# Patient Record
Sex: FEMALE | Race: WHITE | NOT HISPANIC OR LATINO | Employment: OTHER | URBAN - METROPOLITAN AREA
[De-identification: names, ages, dates, MRNs, and addresses within clinical notes are randomized per-mention and may not be internally consistent; named-entity substitution may affect disease eponyms.]

---

## 2017-02-03 ENCOUNTER — OFFICE VISIT (OUTPATIENT)
Dept: FAMILY MEDICINE | Facility: CLINIC | Age: 82
End: 2017-02-03
Payer: COMMERCIAL

## 2017-02-03 VITALS
TEMPERATURE: 96.9 F | HEART RATE: 86 BPM | DIASTOLIC BLOOD PRESSURE: 86 MMHG | OXYGEN SATURATION: 98 % | SYSTOLIC BLOOD PRESSURE: 192 MMHG

## 2017-02-03 DIAGNOSIS — S09.90XA HEAD TRAUMA, INITIAL ENCOUNTER: Primary | ICD-10-CM

## 2017-02-03 DIAGNOSIS — S01.01XA LACERATION OF SCALP, INITIAL ENCOUNTER: ICD-10-CM

## 2017-02-03 PROCEDURE — 99213 OFFICE O/P EST LOW 20 MIN: CPT | Performed by: PHYSICIAN ASSISTANT

## 2017-02-03 NOTE — PROGRESS NOTES
SUBJECTIVE:                                                    Halle Saucedo is a 89 year old female who presents to clinic today for the following health issues:      Patient fell in parking lot, hit back of head about 1 hour ago. She believes she slipped on ice, fell backwards, hitting her head on the cement. The fall was unwitnessed. She does not believe she had any loss of consciousness. She denies a headache, vision changes, dizziness, neck pain, numbness, and tingling.         Problem list and histories reviewed & adjusted, as indicated.  Additional history: as documented    Patient Active Problem List   Diagnosis     HYPERLIPIDEMIA LDL GOAL <130     Advanced directives, counseling/discussion     Glaucoma     PVD (peripheral vascular disease) (H)     Hard of hearing     Gout     Headache     GERD (gastroesophageal reflux disease)     Hypertension goal BP (blood pressure) < 150/90     Melanoma of skin (H)     Hypothyroidism     Other specified hypothyroidism     Past Surgical History   Procedure Laterality Date     Cataract iol, rt/lt       Tonsillectomy & adenoidectomy       childhood     D & c         Social History   Substance Use Topics     Smoking status: Never Smoker     Smokeless tobacco: Not on file     Alcohol use Yes      Comment: rare     Family History   Problem Relation Age of Onset     Hypertension Brother      Hypertension Sister      Hypertension Sister          Current Outpatient Prescriptions   Medication Sig Dispense Refill     losartan-hydrochlorothiazide (HYZAAR) 50-12.5 MG per tablet For blood pressure take one tab BID Pharmacy ok to hold prescription until due 180 tablet 1     furosemide (LASIX) 20 MG tablet Take 1 tablet (20 mg) by mouth daily Pharmacy ok to hold prescription until due 90 tablet 1     potassium chloride (K-TAB,KLOR-CON) 10 MEQ tablet Two tabs daily Pharmacy ok to hold prescription until due 180 tablet 1     omeprazole (PRILOSEC) 20 MG capsule Take 1 capsule (20 mg) by  mouth daily x10 days then as needed for heartburn/upset stomach 30 capsule 1     rosuvastatin (CRESTOR) 10 MG tablet Take 1 tablet (10 mg) by mouth daily For cholesterol Pharmacy ok to hold prescription until due 90 tablet 1     LUMIGAN 0.01 % SOLN        aspirin 81 MG tablet Take 2 tablets by mouth daily.       VITAMIN E 400mg 2 tabs daily       VITAMIN D OR 2 tabs daily       MULTI-VITAMIN OR 1 tab daily       Allergies   Allergen Reactions     Alphagan [Brimonidine Tartrate]      Azopt [Brinzolamide]      Beta Adrenergic Blockers      Bisphosphonates      Estrogens      Gemfibrozil      Xalatan [Latanoprost]      Zetia [Ezetimibe]      Zocor [Hmg-Coa-R Inhibitors]      BP Readings from Last 3 Encounters:   02/13/17 (!) 180/100   02/03/17 192/86   09/08/16 148/88    Wt Readings from Last 3 Encounters:   02/13/17 138 lb (62.6 kg)   09/08/16 140 lb (63.5 kg)   04/11/16 135 lb (61.2 kg)                  Problem list, Medication list, Allergies, and Medical/Social/Surgical histories reviewed in [facility] and updated as appropriate.    ROS:  Constitutional, HEENT, cardiovascular, pulmonary, neurological and integumentary    OBJECTIVE:                                                    /86  Pulse 86  Temp 96.9  F (36.1  C) (Oral)  SpO2 98%  There is no height or weight on file to calculate BMI.  GENERAL: healthy, alert and no distress  EYES: Eyes grossly normal to inspection, PERRL and EOMI  HENT: normal cephalic/atraumatic, ear canals and TM's normal, nose and mouth without ulcers or lesions, oropharynx clear and oral mucous membranes moist  NECK: no adenopathy, no asymmetry, masses, or scars and thyroid normal to palpation  RESP: lungs clear to auscultation - no rales, rhonchi or wheezes  CV: regular rate and rhythm, normal S1 S2, no S3 or S4, no murmur, click or rub, no peripheral edema and peripheral pulses strong  MS: no gross musculoskeletal defects noted, no edema  SKIN: 2cm laceration over the posterior scalp,  minimal bleeding, moderate swelling, mild tenderness to palpation   NEURO: Normal strength and tone, mentation intact and speech normal         ASSESSMENT/PLAN:                                                        ICD-10-CM    1. Head trauma, initial encounter S09.90XA    2. Laceration of scalp, initial encounter S01.01XA        Due to fall being unwitnessed, unknown loss of consciousness, age and taking a daily aspirin, patient was advised to go to [facility] for further evaluation. Her son will transport her to the [facility]. [facility] was notified of the patient. They are accepting the patient.     Marlyn Palacio PA-C  [facility]

## 2017-02-03 NOTE — MR AVS SNAPSHOT
"              After Visit Summary   2/3/2017    Halle Saucedo    MRN: [id number]           Patient Information     Date Of Birth          3/8/1926        Visit Information        Provider Department      2/3/2017 2:40 PM Marlyn Palacio PA-C [facility]        Today's Diagnoses     Head trauma, initial encounter    -  1    Laceration of scalp, initial encounter           Follow-ups after your visit        Who to contact     If you have questions or need follow up information about today's clinic visit or your schedule please contact [facility] directly at [phone].  Normal or non-critical lab and imaging results will be communicated to you by Bolthart, letter or phone within 4 business days after the clinic has received the results. If you do not hear from us within 7 days, please contact the clinic through Bolthart or phone. If you have a critical or abnormal lab result, we will notify you by phone as soon as possible.  Submit refill requests through [employer] or call your pharmacy and they will forward the refill request to us. Please allow 3 business days for your refill to be completed.          Additional Information About Your Visit        MyChart Information     [employer] lets you send messages to your doctor, view your test results, renew your prescriptions, schedule appointments and more. To sign up, go to [email or web address] . Click on \"Log in\" on the left side of the screen, which will take you to the Welcome page. Then click on \"Sign up Now\" on the right side of the page.     You will be asked to enter the access code listed below, as well as some personal information. Please follow the directions to create your username and password.     Your access code is: [email or web address]  Expires: 2017 11:28 AM     Your access code will  in 90 days. If you need help or a new code, please call your [facility] or [phone].        Care EveryWhere ID     This is your " Care EveryWhere ID. This could be used by other organizations to access your [facility] medical records  [id number]        Your Vitals Were     Pulse Temperature Pulse Oximetry             86 96.9  F (36.1  C) (Oral) 98%          Blood Pressure from Last 3 Encounters:   02/13/17 (!) 180/100   02/03/17 192/86   09/08/16 148/88    Weight from Last 3 Encounters:   02/13/17 138 lb (62.6 kg)   09/08/16 140 lb (63.5 kg)   04/11/16 135 lb (61.2 kg)              Today, you had the following     No orders found for display       Primary Care Provider Office Phone # Fax #    Santino Gordon [phone]       [facility] [address] [facility] [address]        Thank you!     Thank you for choosing [facility]  for your care. Our goal is always to provide you with excellent care. Hearing back from our patients is one way we can continue to improve our services. Please take a few minutes to complete the written survey that you may receive in the mail after your visit with us. Thank you!             Your Updated Medication List - Protect others around you: Learn how to safely use, store and throw away your medicines at www.disposemymeds.org.          This list is accurate as of: 2/3/17 11:59 PM.  Always use your most recent med list.                   Brand Name Dispense Instructions for use    aspirin 81 MG tablet      Take 2 tablets by mouth daily.       furosemide 20 MG tablet    LASIX    90 tablet    Take 1 tablet (20 mg) by mouth daily Pharmacy ok to hold prescription until due       losartan-hydrochlorothiazide 50-12.5 MG per tablet    HYZAAR    180 tablet    For blood pressure take one tab BID Pharmacy ok to hold prescription until due       LUMIGAN 0.01 % Soln   Generic drug:  bimatoprost          MULTI-VITAMIN PO      1 tab daily       omeprazole 20 MG CR capsule    priLOSEC    30 capsule    Take 1 capsule (20 mg) by mouth daily x10 days then as needed for heartburn/upset  stomach       potassium chloride 10 MEQ tablet    K-TAB,KLOR-CON    180 tablet    Two tabs daily Pharmacy ok to hold prescription until due       rosuvastatin 10 MG tablet    CRESTOR    90 tablet    Take 1 tablet (10 mg) by mouth daily For cholesterol Pharmacy ok to hold prescription until due       VITAMIN D PO      2 tabs daily       VITAMIN E      400mg 2 tabs daily

## 2017-02-06 NOTE — NURSING NOTE
Late entry - patient walked into clinic c/o head laceration. Patient was leaving the [facility] and walking out the front, patient said suddenly she just tripped over the curb and fell back, hit her head on the cement.  No LOC, patient c/o no pain anywhere else. About a 1 cm laceration to back of head. Patient will be seen by provider. .Stefania YEN, RN, CPN

## 2017-02-13 ENCOUNTER — OFFICE VISIT (OUTPATIENT)
Dept: FAMILY MEDICINE | Facility: CLINIC | Age: 82
End: 2017-02-13
Payer: COMMERCIAL

## 2017-02-13 VITALS
BODY MASS INDEX: 26.95 KG/M2 | HEART RATE: 95 BPM | DIASTOLIC BLOOD PRESSURE: 100 MMHG | TEMPERATURE: 96.8 F | SYSTOLIC BLOOD PRESSURE: 180 MMHG | WEIGHT: 138 LBS

## 2017-02-13 DIAGNOSIS — S01.01XD LACERATION OF SCALP, SUBSEQUENT ENCOUNTER: Primary | ICD-10-CM

## 2017-02-13 PROCEDURE — 99213 OFFICE O/P EST LOW 20 MIN: CPT | Performed by: FAMILY MEDICINE

## 2017-02-13 NOTE — MR AVS SNAPSHOT
"              After Visit Summary   2017    Halle Saucedo    MRN: [id number]           Patient Information     Date Of Birth          3/8/1926        Visit Information        Provider Department      2017 10:15 AM Santino Gordon MD [facility]        Today's Diagnoses     Laceration of scalp, subsequent encounter    -  1       Follow-ups after your visit        Who to contact     If you have questions or need follow up information about today's clinic visit or your schedule please contact [facility] directly at [phone].  Normal or non-critical lab and imaging results will be communicated to you by magnetic.iohart, letter or phone within 4 business days after the clinic has received the results. If you do not hear from us within 7 days, please contact the clinic through magnetic.iohart or phone. If you have a critical or abnormal lab result, we will notify you by phone as soon as possible.  Submit refill requests through [email or web address] or call your pharmacy and they will forward the refill request to us. Please allow 3 business days for your refill to be completed.          Additional Information About Your Visit        MyChart Information     [email or web address] lets you send messages to your doctor, view your test results, renew your prescriptions, schedule appointments and more. To sign up, go to [email or web address] . Click on \"Log in\" on the left side of the screen, which will take you to the Welcome page. Then click on \"Sign up Now\" on the right side of the page.     You will be asked to enter the access code listed below, as well as some personal information. Please follow the directions to create your username and password.     Your access code is: [email or web address]  Expires: 2017 11:28 AM     Your access code will  in 90 days. If you need help or a new code, please call your [facility] or [phone].        Care EveryWhere ID     This is your Care EveryWhere ID. This could be used by " other organizations to access your [facility] medical records  [id number]        Your Vitals Were     Pulse Temperature BMI (Body Mass Index)             95 96.8  F (36  C) (Oral) 26.95 kg/m2          Blood Pressure from Last 3 Encounters:   02/13/17 (!) 180/100   02/03/17 192/86   09/08/16 148/88    Weight from Last 3 Encounters:   02/13/17 138 lb (62.6 kg)   09/08/16 140 lb (63.5 kg)   04/11/16 135 lb (61.2 kg)              Today, you had the following     No orders found for display       Primary Care Provider Office Phone # Fax #    Santino Gordon [phone]       [facility] [address] [facility] [address]        Thank you!     Thank you for choosing [facility]  for your care. Our goal is always to provide you with excellent care. Hearing back from our patients is one way we can continue to improve our services. Please take a few minutes to complete the written survey that you may receive in the mail after your visit with us. Thank you!             Your Updated Medication List - Protect others around you: Learn how to safely use, store and throw away your medicines at www.disposemymeds.org.          This list is accurate as of: 2/13/17 11:28 AM.  Always use your most recent med list.                   Brand Name Dispense Instructions for use    aspirin 81 MG tablet      Take 2 tablets by mouth daily.       furosemide 20 MG tablet    LASIX    90 tablet    Take 1 tablet (20 mg) by mouth daily Pharmacy ok to hold prescription until due       losartan-hydrochlorothiazide 50-12.5 MG per tablet    HYZAAR    180 tablet    For blood pressure take one tab BID Pharmacy ok to hold prescription until due       LUMIGAN 0.01 % Soln   Generic drug:  bimatoprost          MULTI-VITAMIN PO      1 tab daily       omeprazole 20 MG CR capsule    priLOSEC    30 capsule    Take 1 capsule (20 mg) by mouth daily x10 days then as needed for heartburn/upset stomach       potassium  chloride 10 MEQ tablet    K-TAB,KLOR-CON    180 tablet    Two tabs daily Pharmacy ok to hold prescription until due       rosuvastatin 10 MG tablet    CRESTOR    90 tablet    Take 1 tablet (10 mg) by mouth daily For cholesterol Pharmacy ok to hold prescription until due       VITAMIN D PO      2 tabs daily       VITAMIN E      400mg 2 tabs daily

## 2017-02-13 NOTE — NURSING NOTE
Chief Complaint   Patient presents with     Hospital F/U     Fall     staple on back of head       Initial BP (!) 180/100  Pulse 95  Temp 96.8  F (36  C) (Oral)  Wt 138 lb (62.6 kg)  BMI 26.95 kg/m2 Estimated body mass index is 26.95 kg/(m^2) as calculated from the following:    Height as of 4/11/16: 5' (1.524 m).    Weight as of this encounter: 138 lb (62.6 kg).  Medication Reconciliation: complete   Keysha Kumar, CMA

## 2017-03-28 ENCOUNTER — DOCUMENTATION ONLY (OUTPATIENT)
Dept: LAB | Facility: CLINIC | Age: 82
End: 2017-03-28

## 2017-03-28 DIAGNOSIS — E78.5 HYPERLIPIDEMIA LDL GOAL <130: Primary | ICD-10-CM

## 2017-03-28 DIAGNOSIS — I10 HYPERTENSION GOAL BP (BLOOD PRESSURE) < 150/90: ICD-10-CM

## 2017-03-30 DIAGNOSIS — I10 HYPERTENSION GOAL BP (BLOOD PRESSURE) < 150/90: ICD-10-CM

## 2017-03-30 LAB
ALBUMIN SERPL-MCNC: 3.6 G/DL (ref 3.4–5)
ANION GAP SERPL CALCULATED.3IONS-SCNC: 10 MMOL/L (ref 3–14)
BUN SERPL-MCNC: 14 MG/DL (ref 7–30)
CALCIUM SERPL-MCNC: 9 MG/DL (ref 8.5–10.1)
CHLORIDE SERPL-SCNC: 98 MMOL/L (ref 94–109)
CO2 SERPL-SCNC: 29 MMOL/L (ref 20–32)
CREAT SERPL-MCNC: 0.77 MG/DL (ref 0.52–1.04)
GFR SERPL CREATININE-BSD FRML MDRD: 71 ML/MIN/1.7M2
GLUCOSE SERPL-MCNC: 91 MG/DL (ref 70–99)
PHOSPHATE SERPL-MCNC: 2.8 MG/DL (ref 2.5–4.5)
POTASSIUM SERPL-SCNC: 3.7 MMOL/L (ref 3.4–5.3)
SODIUM SERPL-SCNC: 137 MMOL/L (ref 133–144)

## 2017-03-30 PROCEDURE — 36415 COLL VENOUS BLD VENIPUNCTURE: CPT | Performed by: FAMILY MEDICINE

## 2017-03-30 PROCEDURE — 80069 RENAL FUNCTION PANEL: CPT | Performed by: FAMILY MEDICINE

## 2017-04-07 ENCOUNTER — OFFICE VISIT (OUTPATIENT)
Dept: FAMILY MEDICINE | Facility: CLINIC | Age: 82
End: 2017-04-07
Payer: COMMERCIAL

## 2017-04-07 VITALS
DIASTOLIC BLOOD PRESSURE: 76 MMHG | WEIGHT: 138 LBS | BODY MASS INDEX: 27.09 KG/M2 | HEART RATE: 83 BPM | TEMPERATURE: 97.1 F | OXYGEN SATURATION: 97 % | HEIGHT: 60 IN | SYSTOLIC BLOOD PRESSURE: 145 MMHG

## 2017-04-07 DIAGNOSIS — E78.5 HYPERLIPIDEMIA LDL GOAL <130: ICD-10-CM

## 2017-04-07 DIAGNOSIS — I10 HYPERTENSION GOAL BP (BLOOD PRESSURE) < 150/90: ICD-10-CM

## 2017-04-07 DIAGNOSIS — J30.2 SEASONAL ALLERGIC RHINITIS, UNSPECIFIED ALLERGIC RHINITIS TRIGGER: Primary | ICD-10-CM

## 2017-04-07 PROCEDURE — 99214 OFFICE O/P EST MOD 30 MIN: CPT | Performed by: FAMILY MEDICINE

## 2017-04-07 RX ORDER — POTASSIUM CHLORIDE 750 MG/1
TABLET, EXTENDED RELEASE ORAL
Qty: 180 TABLET | Refills: 1 | Status: SHIPPED | OUTPATIENT
Start: 2017-04-07 | End: 2017-10-02

## 2017-04-07 RX ORDER — LOSARTAN POTASSIUM AND HYDROCHLOROTHIAZIDE 12.5; 5 MG/1; MG/1
TABLET ORAL
Qty: 180 TABLET | Refills: 1 | Status: SHIPPED | OUTPATIENT
Start: 2017-04-07 | End: 2017-10-02

## 2017-04-07 RX ORDER — FUROSEMIDE 20 MG
20 TABLET ORAL DAILY
Qty: 90 TABLET | Refills: 1 | Status: SHIPPED | OUTPATIENT
Start: 2017-04-07 | End: 2017-10-02

## 2017-04-07 RX ORDER — ROSUVASTATIN CALCIUM 10 MG/1
10 TABLET, COATED ORAL DAILY
Qty: 90 TABLET | Refills: 1 | Status: SHIPPED | OUTPATIENT
Start: 2017-04-07 | End: 2017-10-02

## 2017-04-07 NOTE — PROGRESS NOTES
SUBJECTIVE:  Halle Saucedo, a 89 year old female scheduled an appointment to discuss the following issues:   blood pressure, chol check , sneezing and nasal congestion   History  skin cancer -seeing dermatology skin speaks  Most active/working in yard. No chest pain or shortness of breath. No nausea, vomiting or diarrhea or bloody stools. No urine changes and emotionally ok.   History ALLERGIC RHINITIS. Sinus congestion/sneezing. claritin not helpful. gerd ok. No dysphagia. prilosec rarely needed.     Medical, social, surgical, and family histories reviewed.    ROS:    OBJECTIVE:  /76  Pulse 83  Temp 97.1  F (36.2  C) (Oral)  Ht 5' (1.524 m)  Wt 138 lb (62.6 kg)  SpO2 97%  BMI 26.95 kg/m2  EXAM:  GENERAL APPEARANCE: healthy, alert and no distress  EYES: EOMI,  PERRL  HENT: ear canals and TM's normal and nose clear discharge  and mouth without ulcers or lesions  NECK: no adenopathy, no asymmetry, masses, or scars and thyroid normal to palpation  RESP: lungs clear to auscultation - no rales, rhonchi or wheezes  CV: regular rates and rhythm, normal S1 S2, no S3 or S4 and no murmur, click or rub -  ABDOMEN:  soft, nontender, no HSM or masses and bowel sounds normal  MS: extremities normal- no gross deformities noted, no evidence of inflammation in joints, FROM in all extremities.  PSYCH: mentation appears normal and affect normal/bright    ASSESSMENT / PLAN:  (J30.2) Seasonal allergic rhinitis, unspecified allergic rhinitis trigger  (primary encounter diagnosis)  Comment: needs help  Plan: over the counter claritin and possible flonase too. Reveiwed risks and side effects of medication  Expected course and warning signs reviewed. No sudafed.     (I10) Hypertension goal BP (blood pressure) < 150/90  Comment: stable  Plan: losartan-hydrochlorothiazide (HYZAAR) 50-12.5         MG per tablet, furosemide (LASIX) 20 MG tablet,        potassium chloride (K-TAB,KLOR-CON) 10 MEQ         tablet        Continue exercise.  Chest pain or shortness of breath to er.     (E78.5) Hyperlipidemia LDL goal <130  Comment: stable in past  Plan: rosuvastatin (CRESTOR) 10 MG tablet        No more testing needed with age/stability.       Santino Gordon MD

## 2017-04-07 NOTE — MR AVS SNAPSHOT
"              After Visit Summary   2017    Halle Saucedo    MRN: [id number]           Patient Information     Date Of Birth          3/8/1926        Visit Information        Provider Department      2017 12:00 PM Santino Gordon MD [facility]        Today's Diagnoses     Seasonal allergic rhinitis, unspecified allergic rhinitis trigger    -  1    Hypertension goal BP (blood pressure) < 150/90        Hyperlipidemia LDL goal <130           Follow-ups after your visit        Who to contact     If you have questions or need follow up information about today's clinic visit or your schedule please contact [facility] directly at [phone].  Normal or non-critical lab and imaging results will be communicated to you by MyChart, letter or phone within 4 business days after the clinic has received the results. If you do not hear from us within 7 days, please contact the clinic through MyChart or phone. If you have a critical or abnormal lab result, we will notify you by phone as soon as possible.  Submit refill requests through [email or web address] or call your pharmacy and they will forward the refill request to us. Please allow 3 business days for your refill to be completed.          Additional Information About Your Visit        MyChart Information     [email or web address] lets you send messages to your doctor, view your test results, renew your prescriptions, schedule appointments and more. To sign up, go to [email or web address] . Click on \"Log in\" on the left side of the screen, which will take you to the Welcome page. Then click on \"Sign up Now\" on the right side of the page.     You will be asked to enter the access code listed below, as well as some personal information. Please follow the directions to create your username and password.     Your access code is: [email or web address]  Expires: 2017 12:28 PM     Your access code will  in 90 days. If you need help or a new code, please call your " [facility] or [phone].        Care EveryWhere ID     This is your Care EveryWhere ID. This could be used by other organizations to access your [facility] medical records  [id number]        Your Vitals Were     Pulse Temperature Height Pulse Oximetry BMI (Body Mass Index)       83 97.1  F (36.2  C) (Oral) 5' (1.524 m) 97% 26.95 kg/m2        Blood Pressure from Last 3 Encounters:   04/07/17 145/76   02/13/17 (!) 180/100   02/03/17 192/86    Weight from Last 3 Encounters:   04/07/17 138 lb (62.6 kg)   02/13/17 138 lb (62.6 kg)   09/08/16 140 lb (63.5 kg)              Today, you had the following     No orders found for display         Where to get your medicines      These medications were sent to [facility] [address] [place] [address] AT [facility] of [address] [address]     Phone:  [phone]     furosemide 20 MG tablet    losartan-hydrochlorothiazide 50-12.5 MG per tablet    potassium chloride 10 MEQ tablet    rosuvastatin 10 MG tablet          Primary Care Provider Office Phone # Fax #    Santino Gordon [phone]       [facility] [address] [facility] [address]        Thank you!     Thank you for choosing [facility]  for your care. Our goal is always to provide you with excellent care. Hearing back from our patients is one way we can continue to improve our services. Please take a few minutes to complete the written survey that you may receive in the mail after your visit with us. Thank you!             Your Updated Medication List - Protect others around you: Learn how to safely use, store and throw away your medicines at www.disposemymeds.org.          This list is accurate as of: 4/7/17 12:33 PM.  Always use your most recent med list.                   Brand Name Dispense Instructions for use    aspirin 81 MG tablet      Take 2 tablets by mouth daily.       furosemide 20 MG  tablet    LASIX    90 tablet    Take 1 tablet (20 mg) by mouth daily Pharmacy ok to hold prescription until due       losartan-hydrochlorothiazide 50-12.5 MG per tablet    HYZAAR    180 tablet    For blood pressure take one tab BID Pharmacy ok to hold prescription until due       LUMIGAN 0.01 % Soln   Generic drug:  bimatoprost          MULTI-VITAMIN PO      1 tab daily       omeprazole 20 MG CR capsule    priLOSEC    30 capsule    Take 1 capsule (20 mg) by mouth daily x10 days then as needed for heartburn/upset stomach       potassium chloride 10 MEQ tablet    K-TAB,KLOR-CON    180 tablet    Two tabs daily Pharmacy ok to hold prescription until due       rosuvastatin 10 MG tablet    CRESTOR    90 tablet    Take 1 tablet (10 mg) by mouth daily For cholesterol Pharmacy ok to hold prescription until due       VITAMIN D PO      2 tabs daily       VITAMIN E      400mg 2 tabs daily

## 2017-04-07 NOTE — NURSING NOTE
Chief Complaint   Patient presents with     Hypertension     Lipids     Allergies       Initial /70  Pulse 83  Temp 97.1  F (36.2  C) (Oral)  Ht 5' (1.524 m)  Wt 138 lb (62.6 kg)  SpO2 97%  BMI 26.95 kg/m2 Estimated body mass index is 26.95 kg/(m^2) as calculated from the following:    Height as of this encounter: 5' (1.524 m).    Weight as of this encounter: 138 lb (62.6 kg).  Medication Reconciliation: complete

## 2017-08-16 ENCOUNTER — OFFICE VISIT (OUTPATIENT)
Dept: FAMILY MEDICINE | Facility: CLINIC | Age: 82
End: 2017-08-16
Payer: COMMERCIAL

## 2017-08-16 ENCOUNTER — RADIANT APPOINTMENT (OUTPATIENT)
Dept: GENERAL RADIOLOGY | Facility: CLINIC | Age: 82
End: 2017-08-16
Attending: FAMILY MEDICINE
Payer: COMMERCIAL

## 2017-08-16 VITALS
DIASTOLIC BLOOD PRESSURE: 54 MMHG | RESPIRATION RATE: 15 BRPM | SYSTOLIC BLOOD PRESSURE: 136 MMHG | BODY MASS INDEX: 26.76 KG/M2 | TEMPERATURE: 97.4 F | WEIGHT: 137 LBS | HEART RATE: 78 BPM | OXYGEN SATURATION: 100 %

## 2017-08-16 DIAGNOSIS — R53.83 FATIGUE, UNSPECIFIED TYPE: Primary | ICD-10-CM

## 2017-08-16 DIAGNOSIS — R05.9 COUGH: ICD-10-CM

## 2017-08-16 DIAGNOSIS — R05.3 PERSISTENT COUGH FOR 3 WEEKS OR LONGER: ICD-10-CM

## 2017-08-16 DIAGNOSIS — R07.89 CHEST HEAVINESS: ICD-10-CM

## 2017-08-16 PROCEDURE — 99214 OFFICE O/P EST MOD 30 MIN: CPT | Mod: 25 | Performed by: FAMILY MEDICINE

## 2017-08-16 PROCEDURE — 93000 ELECTROCARDIOGRAM COMPLETE: CPT | Performed by: FAMILY MEDICINE

## 2017-08-16 PROCEDURE — 71020 XR CHEST 2 VW: CPT

## 2017-08-16 RX ORDER — LEVOFLOXACIN 500 MG/1
500 TABLET, FILM COATED ORAL DAILY
Qty: 7 TABLET | Refills: 0 | Status: SHIPPED | OUTPATIENT
Start: 2017-08-16 | End: 2017-08-28

## 2017-08-16 ASSESSMENT — PAIN SCALES - GENERAL: PAINLEVEL: NO PAIN (0)

## 2017-08-16 NOTE — NURSING NOTE
Chief Complaint   Patient presents with     Cough     c/o cough x 1 month       Initial /54  Pulse 78  Temp 97.4  F (36.3  C) (Oral)  Resp 15  Wt 137 lb (62.1 kg)  SpO2 100%  Breastfeeding? No  BMI 26.76 kg/m2 Estimated body mass index is 26.76 kg/(m^2) as calculated from the following:    Height as of 4/7/17: 5' (1.524 m).    Weight as of this encounter: 137 lb (62.1 kg).  Medication Reconciliation: complete   Dary Lew MA

## 2017-08-16 NOTE — PROGRESS NOTES
SUBJECTIVE:                                                    Halle Saucedo is a 89 year old female who presents to clinic today for the following health issues:      RESPIRATORY SYMPTOMS      Duration: 1 month    Description  Cough and fatigue    Severity: moderate    Accompanying signs and symptoms: None    History (predisposing factors):  none    Precipitating or alleviating factors: None    Therapies tried and outcome:  none      Had a cold about a month ago and started with a real bad sore throat for 3 days  Sore throat for 3 days then got better but then the next day the voice got hoarse  And then also a really bad cough. Cough was wheezing for one day then went away  Cough persisted but more hacking  Things got better though patient didn't come in.  But then the cough persists hacking cough. Some days are good some days are worse.  Today thinks it's a little worse.  Patient also feels tired this morning.   Patient just doesn't feel so good.  Sleeping ok   Chest Pain or shortness of breath: occasionally chest feels heavy  This morning when she woke up felt her chest was heavy.   Orthopnea, Edema, PND: No  Cough, colds, or respiratory symptoms: Yes: above  Abdominal Pain: No  Urinary symptoms or Flank Pain: No  Focal numbness or weakness: No  Rash: No  Fever or Chills: No  Weight loss: No  Poor Appetite: No  History of Thyroid problems/thyroid medication compliance: No  Headache or Neck stiffness: No  Joint Pain or Arthralgias: No  Melena/Hematochezia/Hematemesis: No  Depression or Mood changes: No  Concern about recent tick-bite: No  Concern about recent travel/possible exposure: No    Problem list and histories reviewed & adjusted, as indicated.  Additional history: as documented    Problem list, Medication list, Allergies, and Medical/Social/Surgical histories reviewed in EPIC and updated as appropriate.    ROS:  Constitutional, HEENT, cardiovascular, pulmonary, GI, , musculoskeletal, neuro, skin,  endocrine and psych systems are negative, except as otherwise noted.      OBJECTIVE:                                                    /54  Pulse 78  Temp 97.4  F (36.3  C) (Oral)  Resp 15  Wt 137 lb (62.1 kg)  SpO2 100%  Breastfeeding? No  BMI 26.76 kg/m2  Body mass index is 26.76 kg/(m^2).  GENERAL: healthy, alert and no distress  EYES: Eyes grossly normal to inspection, PERRL and conjunctivae and sclerae normal  Pink palpebral conjunctiva  HENT: ear canals and TM's normal, nose and mouth without ulcers or lesions  NECK: no adenopathy, no asymmetry, masses, or scars and thyroid normal to palpation  RESP: lungs clear to auscultation - no rales, rhonchi or wheezes  DECREASED BREATH SOUNDS BILATERAL LUNG BASES   CV: regular rate and rhythm, normal S1 S2, no S3 or S4, no murmur, click or rub, no peripheral edema and peripheral pulses strong  ABDOMEN: soft, nontender, no hepatosplenomegaly, no masses and bowel sounds normal  MS: no gross musculoskeletal defects noted, no edema  SKIN: no suspicious lesions or rashes  NEURO: Normal strength and tone, mentation intact and speech normal  PSYCH: mentation appears normal, affect normal/bright    Diagnostic Test Results:  Results for orders placed or performed in visit on 08/16/17 (from the past 24 hour(s))   XR Chest 2 Views    Narrative    XR CHEST 2 VW 8/16/2017 1:53 PM    COMPARISON: 9/8/2016    HISTORY: Chest pain and cough.      Impression    IMPRESSION: Cardiac silhouette and pulmonary vasculature are within  normal limits. No focal airspace disease, pleural effusion or  pneumothorax.    DELANEY ELIZAEBTH MD        EKG: reviewed myself. Nothing acute. Occasional PVC    ASSESSMENT/PLAN:                                                    No diagnosis found.      ICD-10-CM    1. Fatigue, unspecified type R53.83 EKG 12-lead complete w/read - Clinics     levofloxacin (LEVAQUIN) 500 MG tablet   2. Chest heaviness R07.89 EKG 12-lead complete w/read - Clinics     XR  "Chest 2 Views   3. Cough R05 XR Chest 2 Views     levofloxacin (LEVAQUIN) 500 MG tablet   4. Persistent cough for 3 weeks or longer R05      Recommended ER evaluation for complete cardiorespiratory rule out however patient declines  She backtracks a little bit and states, \" I would just like an antibiotic. I don't feel that bad\"  Prescribed with levaquin. Side effects discussed. Aware of the risks of increased tendon rupture with fluoroquinolones, especially if used with steroids.  Chest xray negative to my review as well  Patient instructions discussed with patient  Recommend follow up with primary care provider if no relief, sooner if worse  Alarm signs or symptoms discussed, if present recommend go to ER   If with any symptoms of chest pain or shortness of breath, lightheadedness, palpitations, feeling like passing out or change and worsening in the quality of your symptoms, please proceed to ER. Recommend follow up with PCP in a few days for re-evaluation.   Adverse reactions of medications discussed.  Aware to come back in if with worsening symptoms or if no relief despite treatment plan  Patient voiced understanding and had no further questions.     MD Griselda Dave MD  [facility]      "

## 2017-08-28 ENCOUNTER — OFFICE VISIT (OUTPATIENT)
Dept: FAMILY MEDICINE | Facility: CLINIC | Age: 82
End: 2017-08-28
Payer: COMMERCIAL

## 2017-08-28 VITALS
SYSTOLIC BLOOD PRESSURE: 145 MMHG | HEART RATE: 53 BPM | DIASTOLIC BLOOD PRESSURE: 88 MMHG | BODY MASS INDEX: 26.56 KG/M2 | OXYGEN SATURATION: 95 % | TEMPERATURE: 97 F | WEIGHT: 136 LBS

## 2017-08-28 DIAGNOSIS — R05.9 COUGH: ICD-10-CM

## 2017-08-28 DIAGNOSIS — K21.00 GASTROESOPHAGEAL REFLUX DISEASE WITH ESOPHAGITIS: Primary | ICD-10-CM

## 2017-08-28 PROCEDURE — 99214 OFFICE O/P EST MOD 30 MIN: CPT | Performed by: FAMILY MEDICINE

## 2017-08-28 NOTE — NURSING NOTE
Chief Complaint   Patient presents with     Cough     RECHECK       Initial /80  Pulse 53  Temp 97  F (36.1  C) (Oral)  Wt 136 lb (61.7 kg)  SpO2 95%  BMI 26.56 kg/m2 Estimated body mass index is 26.56 kg/(m^2) as calculated from the following:    Height as of 4/7/17: 5' (1.524 m).    Weight as of this encounter: 136 lb (61.7 kg).  Medication Reconciliation: complete   Keysha Kumar, CMA

## 2017-08-28 NOTE — MR AVS SNAPSHOT
After Visit Summary   8/28/2017    Halle Saucedo    MRN: [id number]           Patient Information     Date Of Birth          3/8/1926        Visit Information        Provider Department      8/28/2017 2:50 PM Santino Gordon MD [facility]        Today's Diagnoses     Gastroesophageal reflux disease with esophagitis    -  1    Cough           Follow-ups after your visit        Additional Services     ALLERGY/ASTHMA ADULT REFERRAL       Your provider has referred you to: [facility]: [facility]  [phone] [email or web address]    Please be aware that coverage of these services is subject to the terms and limitations of your health insurance plan.  Call member services at your health plan with any benefit or coverage questions.      Please bring the following with you to your appointment:    (1) Any X-Rays, CTs or MRIs which have been performed.  Contact the facility where they were done to arrange for  prior to your scheduled appointment.    (2) List of current medications  (3) This referral request   (4) Any documents/labs given to you for this referral                  Who to contact     If you have questions or need follow up information about today's clinic visit or your schedule please contact [facility] directly at [phone].  Normal or non-critical lab and imaging results will be communicated to you by MyChart, letter or phone within 4 business days after the clinic has received the results. If you do not hear from us within 7 days, please contact the clinic through MyChart or phone. If you have a critical or abnormal lab result, we will notify you by phone as soon as possible.  Submit refill requests through [employer] or call your pharmacy and they will forward the refill request to us. Please allow 3 business days for your refill to be completed.          Additional Information About Your Visit        MyChart Information   "   [employer] lets you send messages to your doctor, view your test results, renew your prescriptions, schedule appointments and more. To sign up, go to [email or web address] . Click on \"Log in\" on the left side of the screen, which will take you to the Welcome page. Then click on \"Sign up Now\" on the right side of the page.     You will be asked to enter the access code listed below, as well as some personal information. Please follow the directions to create your username and password.     Your access code is: [email or web address]  Expires: 2017  3:12 PM     Your access code will  in 90 days. If you need help or a new code, please call your [facility] clinic or [phone].        Care EveryWhere ID     This is your Care EveryWhere ID. This could be used by other organizations to access your [facility] medical records  [id number]        Your Vitals Were     Pulse Temperature Pulse Oximetry BMI (Body Mass Index)          53 97  F (36.1  C) (Oral) 95% 26.56 kg/m2         Blood Pressure from Last 3 Encounters:   17 145/88   17 136/54   17 145/76    Weight from Last 3 Encounters:   17 136 lb (61.7 kg)   17 137 lb (62.1 kg)   17 138 lb (62.6 kg)              We Performed the Following     ALLERGY/ASTHMA ADULT REFERRAL          Where to get your medicines      These medications were sent to [facility] [address], [address]  [id number] [place] [address] [address]     Phone:  [phone]     omeprazole 20 MG CR capsule          Primary Care Provider Office Phone # Fax #    Santino Gordon [phone] [address] [facility] [address]        Equal Access to Services     YANIRA MCCALL : Thuy Kramer, eloise snowden, jai dennis. [facility] [phone].    ATENCIÓN: Si habla lizaañol, tiene a reza disposición servicios gratuitos de asistencia " lingüísticaEfrain Nova al [phone].    We comply with applicable federal civil rights laws and [place] laws. We do not discriminate on the basis of race, color, national origin, age, disability sex, sexual orientation or gender identity.            Thank you!     Thank you for choosing [facility]  [facility]. Our goal is always to provide you with excellent care. Hearing back from our patients is one way we can continue to improve our services. Please take a few minutes to complete the written survey that you may receive in the mail after your visit with us. Thank you!             Your Updated Medication List - Protect others around you: Learn how to safely use, store and throw away your medicines at www.disposemymeds.org.          This list is accurate as of: 8/28/17  4:28 PM.  Always use your most recent med list.                   Brand Name Dispense Instructions for use Diagnosis    aspirin 81 MG tablet      Take 2 tablets by mouth daily.        furosemide 20 MG tablet    LASIX    90 tablet    Take 1 tablet (20 mg) by mouth daily Pharmacy ok to hold prescription until due    Hypertension goal BP (blood pressure) < 150/90       losartan-hydrochlorothiazide 50-12.5 MG per tablet    HYZAAR    180 tablet    For blood pressure take one tab BID Pharmacy ok to hold prescription until due    Hypertension goal BP (blood pressure) < 150/90       LUMIGAN 0.01 % Soln   Generic drug:  bimatoprost           MULTI-VITAMIN PO      1 tab daily        omeprazole 20 MG CR capsule    priLOSEC    30 capsule    Take 1 capsule (20 mg) by mouth daily x10 days then as needed for heartburn/upset stomach    Gastroesophageal reflux disease with esophagitis       potassium chloride 10 MEQ tablet    K-TAB,KLOR-CON    180 tablet    Two tabs daily Pharmacy ok to hold prescription until due    Hypertension goal BP (blood pressure) < 150/90       rosuvastatin 10 MG tablet    CRESTOR    90 tablet    Take 1 tablet (10 mg) by  mouth daily For cholesterol Pharmacy ok to hold prescription until due    Hyperlipidemia LDL goal <130       VITAMIN D PO      2 tabs daily        VITAMIN E      400mg 2 tabs daily

## 2017-08-28 NOTE — PROGRESS NOTES
SUBJECTIVE:  Halle Saucedo, a 89 year old female scheduled an appointment to discuss the following issues:  Cough. Seen in urgent care 2.5 weeks ago and given prescription levaquin and normal xray. Non-smoker.   Cough about the same. No fevers or chills. Dry cough. mild sinus congestion.   Some sneezing today. No itchy red eyes.   levaquin - side effect - some dizziness. No wheezing/ shortness of breath.   No history bronchitis/pneumonia. No travel recently.  No plane/car rides.  gerd symptoms - rolaids. Rare prilosec.    Overall less fatigued now.     Medical, social, surgical, and family histories reviewed.    ROS:    OBJECTIVE:  /88  Pulse 53  Temp 97  F (36.1  C) (Oral)  Wt 136 lb (61.7 kg)  SpO2 95%  BMI 26.56 kg/m2  EXAM:  GENERAL APPEARANCE: healthy, alert and no distress  EYES: EOMI,  PERRL  HENT: ear canals and TM's normal and nose clear discharge  and mouth without ulcers or lesions  NECK: no adenopathy, no asymmetry, masses, or scars and thyroid normal to palpation  RESP: faint basalir crackles. Good overall airflow. No wheezing.   CV: regular rates and rhythm, normal S1 S2, no S3 or S4 and no murmur, click or rub -  ABDOMEN:  soft, nontender, no HSM or masses and bowel sounds normal  MS: extremities normal- no gross deformities noted, no evidence of inflammation in joints, FROM in all extremities.  PSYCH: mentation appears normal and affect normal/bright    ASSESSMENT / PLAN:  (K21.0) Gastroesophageal reflux disease with esophagitis  (primary encounter diagnosis)  Comment: needs help  Plan: omeprazole (PRILOSEC) 20 MG CR capsule        Possible source of cough. egd if worse. Reveiwed risks and side effects of medication  x10 days then prn. Limit spicy foods/caffeine/ late eating.     (R05) Cough  Comment: resolving pneumonia/bronchitis vs post-nasal or gerd. Some crackles in lungs but no fevers or chills and normal xray  Plan: ALLERGY/ASTHMA ADULT REFERRAL        Follow-up allergist for second  opinion if not continue improve in next 2-3 weeks. Return to clinic sooner if worse/new symptoms. To ER if shortness of breath/high fevers or [place] weight gain. Consider ct scan but non-smoker. Call/email with questions/concerns. Expected course and warning signs reviewed.   Trial of claritin?  Santino Gordon MD

## 2017-09-26 ENCOUNTER — TELEPHONE (OUTPATIENT)
Dept: FAMILY MEDICINE | Facility: CLINIC | Age: 82
End: 2017-09-26

## 2017-09-26 NOTE — TELEPHONE ENCOUNTER
Patient is schedule with Dr. Gordon. Please triage pt per Dr. Gordon for headache and confusion .    Keysha Kumar, CMA

## 2017-09-26 NOTE — TELEPHONE ENCOUNTER
Halle Saucedo is a 89 year old female who calls with mckay..    NURSING ASSESSMENT:  Description: spoke with pt daughter.  Pt was fine in am yesterday and then at 11 am developed a violent ha with dizziness.  Pt could not figure out how to use phone.   Pt has had increasing fatigue the last few months and has fatigue today.  Pt has nausea and is not eating much.  Pt is not stable on feet today.  Pt states ha is only present with hard cough today.  Pt has been sleeping for most of the morning.   Allergies:   Allergies   Allergen Reactions     Alphagan [Brimonidine Tartrate]      Azopt [Brinzolamide]      Beta Adrenergic Blockers      Bisphosphonates      Estrogens      Gemfibrozil      Xalatan [Latanoprost]      Zetia [Ezetimibe]      Zocor [Hmg-Coa-R Inhibitors]          NURSING PLAN: Huddle with provider, plan includes pt should be brought to ER now    RECOMMENDED DISPOSITION:  To ED, another person to drive - daughter will take her.   Will comply with recommendation: Yes  If further questions/concerns or if symptoms do not improve, worsen or new symptoms develop, call your PCP or [facility] as soon as possible.      Guideline used:  Spoke with provider.   To provider to cosign.    Keysha Juárez RN

## 2017-09-29 PROBLEM — Z53.9 ERRONEOUS ENCOUNTER--DISREGARD: Status: ACTIVE | Noted: 2017-09-29

## 2017-10-02 ENCOUNTER — OFFICE VISIT (OUTPATIENT)
Dept: FAMILY MEDICINE | Facility: CLINIC | Age: 82
End: 2017-10-02
Payer: COMMERCIAL

## 2017-10-02 VITALS
DIASTOLIC BLOOD PRESSURE: 80 MMHG | WEIGHT: 131 LBS | SYSTOLIC BLOOD PRESSURE: 120 MMHG | HEIGHT: 60 IN | BODY MASS INDEX: 25.72 KG/M2 | OXYGEN SATURATION: 99 % | TEMPERATURE: 98.8 F | HEART RATE: 50 BPM

## 2017-10-02 DIAGNOSIS — E78.5 HYPERLIPIDEMIA LDL GOAL <130: ICD-10-CM

## 2017-10-02 DIAGNOSIS — R41.3 MEMORY DIFFICULTIES: ICD-10-CM

## 2017-10-02 DIAGNOSIS — I65.29 STENOSIS OF CAROTID ARTERY, UNSPECIFIED LATERALITY: ICD-10-CM

## 2017-10-02 DIAGNOSIS — R09.89 LABILE BLOOD PRESSURE: Primary | ICD-10-CM

## 2017-10-02 DIAGNOSIS — I10 HYPERTENSION GOAL BP (BLOOD PRESSURE) < 150/90: ICD-10-CM

## 2017-10-02 PROCEDURE — 99214 OFFICE O/P EST MOD 30 MIN: CPT | Performed by: FAMILY MEDICINE

## 2017-10-02 RX ORDER — LOSARTAN POTASSIUM AND HYDROCHLOROTHIAZIDE 12.5; 5 MG/1; MG/1
TABLET ORAL
Qty: 180 TABLET | Refills: 1 | Status: SHIPPED | OUTPATIENT
Start: 2017-10-02 | End: 2017-10-11 | Stop reason: ALTCHOICE

## 2017-10-02 RX ORDER — AMLODIPINE BESYLATE 5 MG/1
5 TABLET ORAL DAILY
Qty: 90 TABLET | Refills: 1 | Status: SHIPPED | OUTPATIENT
Start: 2017-10-02 | End: 2017-10-11 | Stop reason: ALTCHOICE

## 2017-10-02 RX ORDER — ROSUVASTATIN CALCIUM 10 MG/1
10 TABLET, COATED ORAL DAILY
Qty: 90 TABLET | Refills: 1 | Status: SHIPPED | OUTPATIENT
Start: 2017-10-02 | End: 2017-10-11 | Stop reason: ALTCHOICE

## 2017-10-02 RX ORDER — FUROSEMIDE 20 MG
20 TABLET ORAL DAILY
Qty: 135 TABLET | Refills: 1 | Status: SHIPPED | OUTPATIENT
Start: 2017-10-02 | End: 2018-08-13

## 2017-10-02 RX ORDER — AMLODIPINE BESYLATE 5 MG/1
5 TABLET ORAL
COMMUNITY
Start: 2017-09-27 | End: 2017-10-02

## 2017-10-02 RX ORDER — POTASSIUM CHLORIDE 750 MG/1
TABLET, EXTENDED RELEASE ORAL
Qty: 180 TABLET | Refills: 1 | Status: SHIPPED | OUTPATIENT
Start: 2017-10-02 | End: 2018-03-19

## 2017-10-02 NOTE — PROGRESS NOTES
SUBJECTIVE:   Halle Saucedo is a 89 year old female who presents to clinic today for the following health issues:    Follow-up headache/hypertensive urgency. norvasc added. Normal u/a, cbc, renal panel and troponin. No cancers/masses seen on brain imaging but stenosis of carotid noted.   Patient will call vascular surgery. Headaches resolves. Blood pressure cuff at home wrist. Home reading 157-95/95-71. Average 120/80. No chest pain. No shortness of breath. Memory not as sharp since discharge. Here with daughter. No focal weakness/tingling. No nausea, vomiting or diarrhea. No black/bloody stools. No abdominal pain. No urine changes or hematuria. Emotionally ok and sleep ok. No palpitations. Taking asa. No blurry vision - seen eye provider tomorrow. No rashes or joint/leg swelling.   No added sodium/salt. Likes soup. Limited restaurants.     Per d/c summary:  BRIEF HOSPITAL COURSE: This 89 y.o. female presented to the Emergency Room with complaints of headache, confusion, and high blood pressure. Her symptoms resolved by the time she was seen in the ER. She was noted to have significant hypertension. She was started on amlodipine and monitored overnight. While in the ER, she had a possible brief run of atrial fibrillation noted telemetry, however, this resolved prior to EKG. She did not have any further cardiac dysrhythmia noted during the hospital stay. She received 2 doses of metoprolol because of the brief tachycardia. Her blood pressure was better controlled on 9/27. She did have some mild orthostatic change without symptoms. She felt well and had no further headache or confusion episodes. She requested discharge home. She will remain on amlodipine taken at lunch time. She is instructed to self monitor her blood pressure twice daily and be cautious for low blood pressure readings and changes in blood pressure with sitting / standing. She was cleared for discharge with follow up in clinic within one week for blood  pressure check. Patient referred to vascular surgery for evaluation of carotid stenosis.    Hospital Follow-up Visit:    Hospital/Nursing Home/IP Rehab Facility: [facility]  Date of Admission: 09-  Date of Discharge: 09-  Reason(s) for Admission: blood pressure             Problems taking medications regularly:  None       Medication changes since discharge:        Problems adhering to non-medication therapy:  None      Summary of hospitalization:  [facility] discharge summary reviewed  Diagnostic Tests/Treatments reviewed.  Follow up needed: none  Other Healthcare Providers Involved in Patient s Care:         None  Update since discharge: improved.     Post Discharge Medication Reconciliation: discharge medications reconciled, continue medications without change.  Plan of care communicated with patient and family     Coding guidelines for this visit:  Type of Medical   Decision Making Face-to-Face Visit       within 7 Days of discharge Face-to-Face Visit        within 14 days of discharge   Moderate Complexity 17036 [id number]   High Complexity 48305 [id number]              PROBLEMS TO ADD ON...    Problem list and histories reviewed & adjusted, as indicated.  Additional history: as documented    Patient Active Problem List   Diagnosis     HYPERLIPIDEMIA LDL GOAL <130     Advanced directives, counseling/discussion     Glaucoma     PVD (peripheral vascular disease) (H)     Hard of hearing     Gout     Headache     GERD (gastroesophageal reflux disease)     Hypertension goal BP (blood pressure) < 150/90     Melanoma of skin (H)     Hypothyroidism     Other specified hypothyroidism     Seasonal allergic rhinitis, unspecified allergic rhinitis trigger     ERRONEOUS ENCOUNTER--DISREGARD     Stenosis of carotid artery, unspecified laterality     Past Surgical History:   Procedure Laterality Date     CATARACT IOL, RT/LT       D & C       TONSILLECTOMY & ADENOIDECTOMY      childhood       Social History  "  Substance Use Topics     Smoking status: Never Smoker     Smokeless tobacco: Not on file     Alcohol use Yes      Comment: rare     Family History   Problem Relation Age of Onset     Hypertension Brother      Hypertension Sister      Hypertension Sister              Reviewed and updated as needed this [facility]  M[facility]       Reviewed and updated as needed this visit by Provider         ROS:  All other ROS negative.     OBJECTIVE:     /80 (BP Location: Right arm, Patient Position: Sitting, Cuff Size: Adult Regular)  Pulse 50  Temp 98.8  F (37.1  C) (Oral)  Ht 4' 11.75\" (1.518 m)  Wt 131 lb (59.4 kg)  SpO2 99%  BMI 25.8 kg/m2  Body mass index is 25.8 kg/(m^2).  GENERAL: healthy, alert and no distress  EYES: Eyes grossly normal to inspection, PERRL and conjunctivae and sclerae normal  HENT: ear canals and TM's normal, nose and mouth without ulcers or lesions  NECK: no adenopathy, no asymmetry, masses, or scars and thyroid normal to palpation  RESP: lungs clear to auscultation - no rales, rhonchi or wheezes  CV: regular rate and rhythm, normal S1 S2, no S3 or S4, no murmur, click or rub, no peripheral edema and peripheral pulses strong  ABDOMEN: soft, nontender, no hepatosplenomegaly, no masses and bowel sounds normal  MS: no gross musculoskeletal defects noted, no edema  SKIN: no suspicious lesions or rashes  NEURO: Normal strength and tone, mentation intact and speech normal  PSYCH: mentation appears normal but a little slow affect normal/bright        ASSESSMENT/PLAN:     ASSESSMENT / PLAN:  (R09.89) Labile blood pressure  (primary encounter diagnosis)  Comment: unclear etiology last week. Appears better now  Plan: order for DME        Continue closely monitor. Prescription new blood pressure cuff. Limit sodium <2g/day and consistent meal times.     (I10) Hypertension goal BP (blood pressure) < 150/90  Comment: ok now  Plan: losartan-hydrochlorothiazide (HYZAAR) 50-12.5         MG " per tablet, furosemide (LASIX) 20 MG tablet,        potassium chloride (K-TAB,KLOR-CON) 10 MEQ         tablet, amLODIPine (NORVASC) 5 MG tablet, order        for DME        See above. If frequently <110/70 than hold norvasc vs if consistently >160/100 extra lasix. Recheck in 3 months  Sooner if worse. Chest pain or shortness of breath or new neuro changes to ER.  Expected course and warning signs reviewed. Call/email with questions/concerns    (I65.29) Stenosis of carotid artery, unspecified laterality  Plan: seeing surgery. Not sure major/aggressive surgery a good idea but can maybe do shenting? Can get second opinion if needed. Good blood pressure/lipid control. Continue asa daily.     (E78.5) Hyperlipidemia LDL goal <130  Comment: stable in past  Plan: rosuvastatin (CRESTOR) 10 MG tablet        Recheck in 6 months      (R41.3) Memory difficulties  Comment: new from last week. From blood pressure lower or mini-cva?  Plan: discuss with vascular surgery and maybe hold norvasc (if blood pressure not too high) to see if helpful. Continue keep active. Consider neuro input too. Recheck in 3 months  Sooner if worse/new issues. Daughter closely involved.     Santino Gordon MD  [facility]

## 2017-10-02 NOTE — NURSING NOTE
"Chief Complaint   Patient presents with     Hospital F/U       Initial /80 (BP Location: Right arm, Patient Position: Sitting, Cuff Size: Adult Regular)  Pulse 50  Temp 98.8  F (37.1  C) (Oral)  Ht 4' 11.75\" (1.518 m)  Wt 131 lb (59.4 kg)  SpO2 99%  BMI 25.8 kg/m2 Estimated body mass index is 25.8 kg/(m^2) as calculated from the following:    Height as of this encounter: 4' 11.75\" (1.518 m).    Weight as of this encounter: 131 lb (59.4 kg).  Medication Reconciliation: complete  Keysha Kumra CMA    "

## 2017-10-02 NOTE — MR AVS SNAPSHOT
"              After Visit Summary   10/2/2017    Halle Saucedo    MRN: [id number]           Patient Information     Date Of Birth          3/8/1926        Visit Information        Provider Department      10/2/2017 10:35 AM Santino Gordon MD [facility]        Today's Diagnoses     Labile blood pressure    -  1    Hypertension goal BP (blood pressure) < 150/90        Stenosis of carotid artery, unspecified laterality        Hyperlipidemia LDL goal <130           Follow-ups after your visit        Who to contact     If you have questions or need follow up information about today's clinic visit or your schedule please contact [facility] directly at [phone].  Normal or non-critical lab and imaging results will be communicated to you by MyChart, letter or phone within 4 business days after the clinic has received the results. If you do not hear from us within 7 days, please contact the clinic through Limeadehart or phone. If you have a critical or abnormal lab result, we will notify you by phone as soon as possible.  Submit refill requests through Wooop or call your pharmacy and they will forward the refill request to us. Please allow 3 business days for your refill to be completed.          Additional Information About Your Visit        MyChart Information     Wooop lets you send messages to your doctor, view your test results, renew your prescriptions, schedule appointments and more. To sign up, go to [email or web address] . Click on \"Log in\" on the left side of the screen, which will take you to the Welcome page. Then click on \"Sign up Now\" on the right side of the page.     You will be asked to enter the access code listed below, as well as some personal information. Please follow the directions to create your username and password.     Your access code is: [email or web address]  Expires: 2017  3:12 PM     Your access code will  in 90 days. If you need help or a new code, " "please call your [facility] clinic or [phone].        Care EveryWhere ID     This is your Care EveryWhere ID. This could be used by other organizations to access your [facility] medical records  [id number]        Your Vitals Were     Pulse Temperature Height Pulse Oximetry BMI (Body Mass Index)       50 98.8  F (37.1  C) (Oral) 4' 11.75\" (1.518 m) 99% 25.8 kg/m2        Blood Pressure from Last 3 Encounters:   10/02/17 120/80   08/28/17 145/88   08/16/17 136/54    Weight from Last 3 Encounters:   10/02/17 131 lb (59.4 kg)   08/28/17 136 lb (61.7 kg)   08/16/17 137 lb (62.1 kg)              Today, you had the following     No orders found for display         Today's Medication Changes          These changes are accurate as of: 10/2/17 11:22 AM.  If you have any questions, ask your nurse or doctor.               These medicines have changed or have updated prescriptions.        Dose/Directions    amLODIPine 5 MG tablet   Commonly known as:  NORVASC   This may have changed:    - when to take this  - additional instructions   Used for:  Hypertension goal BP (blood pressure) < 150/90        Dose:  5 mg   Take 1 tablet (5 mg) by mouth daily For blood pressure in pm Pharmacy ok to hold prescription until due   Quantity:  90 tablet   Refills:  1       furosemide 20 MG tablet   Commonly known as:  LASIX   This may have changed:  additional instructions   Used for:  Hypertension goal BP (blood pressure) < 150/90        Dose:  20 mg   Take 1 tablet (20 mg) by mouth daily For blood pressure in AM. Make take extra pill if needed Pharmacy ok to hold prescription until due   Quantity:  135 tablet   Refills:  1            Where to get your medicines      These medications were sent to [facility] [address] [place] [address] [address] [facility] AT SEC of [facility]  [address] [facility] [address]     Phone:  [phone]     amLODIPine 5 MG tablet    furosemide 20 MG tablet    " losartan-hydrochlorothiazide 50-12.5 MG per tablet    potassium chloride 10 MEQ tablet    rosuvastatin 10 MG tablet                Primary Care Provider Office Phone # Fax #    Santino Gordon [phone] [address] [facility] [address]        Equal Access to Services     YANIRA MCCALL : Hadii aad ku hadasho Soomaali, waaxda luqadaha, qaybta kaalmada adeegyada, waxay jasonin hayriccin michelle roxieneftali tang. So [facility] [phone].    ATENCIÓN: Si habla español, tiene a reza disposición servicios gratuitos de asistencia lingüística. Avtar al [phone].    We comply with applicable federal civil rights laws and [place] laws. We do not discriminate on the basis of race, color, national origin, age, disability, sex, sexual orientation, or gender identity.            Thank you!     Thank you for choosing [facility]  for your care. Our goal is always to provide you with excellent care. Hearing back from our patients is one way we can continue to improve our services. Please take a few minutes to complete the written survey that you may receive in the mail after your visit with us. Thank you!             Your Updated Medication List - Protect others around you: Learn how to safely use, store and throw away your medicines at www.disposemymeds.org.          This list is accurate as of: 10/2/17 11:22 AM.  Always use your most recent med list.                   Brand Name Dispense Instructions for use Diagnosis    amLODIPine 5 MG tablet    NORVASC    90 tablet    Take 1 tablet (5 mg) by mouth daily For blood pressure in pm Pharmacy ok to hold prescription until due    Hypertension goal BP (blood pressure) < 150/90       aspirin 81 MG tablet      Take 2 tablets by mouth daily.        furosemide 20 MG tablet    LASIX    135 tablet    Take 1 tablet (20 mg) by mouth daily For blood pressure in AM. Make take extra pill if needed Pharmacy ok to hold prescription until due    Hypertension  goal BP (blood pressure) < 150/90       losartan-hydrochlorothiazide 50-12.5 MG per tablet    HYZAAR    180 tablet    For blood pressure take one tab BID Pharmacy ok to hold prescription until due    Hypertension goal BP (blood pressure) < 150/90       LUMIGAN 0.01 % Soln   Generic drug:  bimatoprost           MULTI-VITAMIN PO      1 tab daily        omeprazole 20 MG CR capsule    priLOSEC    30 capsule    Take 1 capsule (20 mg) by mouth daily x10 days then as needed for heartburn/upset stomach    Gastroesophageal reflux disease with esophagitis       potassium chloride 10 MEQ tablet    K-TAB,KLOR-CON    180 tablet    Two tabs daily Pharmacy ok to hold prescription until due    Hypertension goal BP (blood pressure) < 150/90       rosuvastatin 10 MG tablet    CRESTOR    90 tablet    Take 1 tablet (10 mg) by mouth daily For cholesterol Pharmacy ok to hold prescription until due    Hyperlipidemia LDL goal <130       VITAMIN D PO      2 tabs daily        VITAMIN E      400mg 2 tabs daily

## 2017-10-09 NOTE — PROGRESS NOTES
SUBJECTIVE:   Halle Saucedo is a 89 year old female who presents to clinic today for the following health issues:      Patient is accompanied by her daughter.     Hospital Follow-up Visit:    Hospital/Nursing Home/IP Rehab Facility: [facility]  Date of Admission: 10/6/17  Date of Discharge: 10/9/17  Reason(s) for Admission: Heart attack            Problems taking medications regularly:  None       Medication changes since discharge: None       Problems adhering to non-medication therapy:  None    Summary of hospitalization:  CareEverywhere information obtained and reviewed  Diagnostic Tests/Treatments reviewed.  Follow up needed: PCP and Cardiology-see Patient Instructions below.   Other Healthcare Providers Involved in Patient s Care:         Homecare  Update since discharge: improved.     Post Discharge Medication Reconciliation: discharge medications reconciled and changed, per note/orders (see AVS).  Plan of care communicated with patient and family     Coding guidelines for this visit:  Type of Medical   Decision Making Face-to-Face Visit       within 7 Days of discharge Face-to-Face Visit        within 14 days of discharge   Moderate Complexity [id number]   High Complexity [id number]            BRIEF HOSPITAL COURSE: This 89 y.o. female presented 10/6 after a syncopal episode at home. She had inferior ST elevation on EKG and was transferred urgently to the cath lab. Prior to her procedure, she suffered a ventricular fibrillation cardiac arrest requiring defibrillation. Coronary angiography revealed a 100% mid RCA lesion and a 70% lesion in the proximal RCA. She underwent successful drug eluting stent placement. EF was preserved.    She had hypotension post procedure requiring dopamine, but her blood pressure recovered fairly quickly. She was noted to have paroxymal atrial fibrillation with a controlled ventricular rate, which was asymptomatic. She has progressed well with cardiac rehab, and has been ambulating  without anginal symptoms. She's being discharged home in stable condition. Of note, she has a high CHADS-VASc score. She will be on aspirin, plavix, and Eliquis on discharge. A P2Y12 will be checked at the end of the week. If she's a plavix responder, her aspirin will be discontinued in an effort to minimize bleeding risk.      The patient has not had anginal symptoms, or arrhythmia (afib) symptoms or excess bleeding since hospital discharge. She will see a Cardiology NP on 10.13.17. She was advised of the symptoms of chest pain, arrhythmia and bleeding to watch for today.         Reviewed and updated as needed this visit by clinical staff  Tobacco  Allergies  Meds  Problems  Med Hx  Surg Hx  Fam Hx  Soc Hx        Reviewed and updated as needed this visit by Provider  Meds  Problems         Patient Active Problem List   Diagnosis     HYPERLIPIDEMIA LDL GOAL <130     Advanced directives, counseling/discussion     Glaucoma     PVD (peripheral vascular disease) (H)     Hard of hearing     Gout     Headache     GERD (gastroesophageal reflux disease)     Hypertension goal BP (blood pressure) < 150/90     Melanoma of skin (H)     Hypothyroidism     Other specified hypothyroidism     Seasonal allergic rhinitis, unspecified allergic rhinitis trigger     ERRONEOUS ENCOUNTER--DISREGARD     Stenosis of carotid artery, unspecified laterality       Past Medical History:   Diagnosis Date     Glaucoma     left and right eyes     Other and unspecified hyperlipidemia      Unspecified essential hypertension        Past Surgical History:   Procedure Laterality Date     CATARACT IOL, RT/LT       D & C       TONSILLECTOMY & ADENOIDECTOMY      childhood       Family History   Problem Relation Age of Onset     Hypertension Brother      Hypertension Sister      Hypertension Sister        Social History   Substance Use Topics     Smoking status: Never Smoker     Smokeless tobacco: Never Used     Alcohol use Yes      Comment: rare        Current Outpatient Prescriptions   Medication     atorvastatin (LIPITOR) 20 MG tablet     clopidogrel (PLAVIX) 75 MG tablet     apixaban ANTICOAGULANT (ELIQUIS) 2.5 MG tablet     metoprolol (TOPROL-XL) 25 MG 24 hr tablet     nitroGLYcerin (NITRODUR) 0.4 MG/HR 24 hr patch     losartan (COZAAR) 50 MG tablet     furosemide (LASIX) 20 MG tablet     potassium chloride (K-TAB,KLOR-CON) 10 MEQ tablet     omeprazole (PRILOSEC) 20 MG CR capsule     LUMIGAN 0.01 % SOLN     aspirin 81 MG tablet     VITAMIN E     VITAMIN D OR     MULTI-VITAMIN OR     No current facility-administered medications for this visit.          ROS:  Constitutional, HEENT, cardiovascular, pulmonary, GI, , musculoskeletal, neuro, skin, endocrine and psych systems are negative, except as otherwise noted.     OBJECTIVE:                                                    /70  Pulse 53  Temp 97.6  F (36.4  C) (Oral)  Wt 138 lb (62.6 kg)  SpO2 97%  BMI 27.18 kg/m2     GENERAL APPEARANCE: healthy, alert and in no distress  EYES: Eyes grossly normal to inspection, and conjunctivae and sclerae normal  HENT: head normocephalic and atraumatic and mouth without ulcers or lesions, oropharynx clear and oral mucous membranes moist  NECK: no noticeable adenopathy, no asymmetry, masses, or scars   RESP: lungs clear to auscultation - no rales, rhonchi or wheezes  CV: regular rate and rhythm, normal S1 S2, no S3 or S4, no murmur, click or rub, no peripheral edema and peripheral pulses strong  ABDOMEN: soft, nontender, no hepatosplenomegaly, no masses and bowel sounds normal  MS: no musculoskeletal defects are noted and gait is age appropriate without ataxia  SKIN:   Right groin cath site with resolving ecchymoses, no bruits.   no suspicious lesions or rashes  NEURO: mentation intact and speech normal  PSYCH: mentation appears normal and affect normal/bright.    Results for orders placed or performed in visit on 08/16/17   XR Chest 2 Views    Narrative  "   XR CHEST 2 VW 8/16/2017 1:53 PM    COMPARISON: 9/8/2016    HISTORY: Chest pain and cough.      Impression    IMPRESSION: Cardiac silhouette and pulmonary vasculature are within  normal limits. No focal airspace disease, pleural effusion or  pneumothorax.    DELANEY ELIZABETH MD       No results found for this or any previous visit (from the past 744 hour(s)).      ASSESSMENT/PLAN:                                                        ICD-10-CM    1. Benign essential hypertension I10 losartan (COZAAR) 50 MG tablet   2. Atrial fibrillation, unspecified type (H) I48.91    3. ST elevation myocardial infarction (STEMI), unspecified artery (H) I21.3      Time spent coordinating care was approximately 30 minutes out of a total of 39 minutes or less (which was the total duration of the appointment) including the diagnosis, prognosis and treatment of the above medical conditions.     Patient Instructions   Please increase your losartan from 25mg daily to 50mg daily.   Please keep your appointment with the Cardiology provider on 10.13.17. Ask them to have a \"basic metabolic panel\" checked at that visit on 10.13.17, since we increased your losartan today (10.11.17).    For your knee pain: you can use acetaminophen (Tylenol) but you should avoid all: Excedrin, ibuprofen (aka Advil, Motrin) and Aleve (aka naproxen).     Please continue to see your PCP, Santino Gordon at regular intervals.       Jannette Garcia MD    [facility]  [address] [place] [address] [id number] [phone]    "

## 2017-10-10 ENCOUNTER — TELEPHONE (OUTPATIENT)
Dept: FAMILY MEDICINE | Facility: CLINIC | Age: 82
End: 2017-10-10

## 2017-10-10 NOTE — TELEPHONE ENCOUNTER
Home care calling to request verbal orders for skilled nursing services, PT/OT. She will fax over the specifics.

## 2017-10-10 NOTE — TELEPHONE ENCOUNTER
Per Standing Order Protocol, Verbal Orders are provided for Home Care, Assisted Living or Nursing Home Evaluations and Treatments.    Per protocol, to provider as an FYI.  Sheryl Peterson RN

## 2017-10-11 ENCOUNTER — OFFICE VISIT (OUTPATIENT)
Dept: INTERNAL MEDICINE | Facility: CLINIC | Age: 82
End: 2017-10-11
Payer: COMMERCIAL

## 2017-10-11 VITALS
WEIGHT: 138 LBS | OXYGEN SATURATION: 97 % | SYSTOLIC BLOOD PRESSURE: 160 MMHG | HEART RATE: 53 BPM | TEMPERATURE: 97.6 F | DIASTOLIC BLOOD PRESSURE: 70 MMHG | BODY MASS INDEX: 27.18 KG/M2

## 2017-10-11 DIAGNOSIS — I21.3 ST ELEVATION MYOCARDIAL INFARCTION (STEMI), UNSPECIFIED ARTERY (H): ICD-10-CM

## 2017-10-11 DIAGNOSIS — I10 BENIGN ESSENTIAL HYPERTENSION: Primary | ICD-10-CM

## 2017-10-11 DIAGNOSIS — I48.91 ATRIAL FIBRILLATION, UNSPECIFIED TYPE (H): ICD-10-CM

## 2017-10-11 PROCEDURE — 99495 TRANSJ CARE MGMT MOD F2F 14D: CPT | Performed by: INTERNAL MEDICINE

## 2017-10-11 RX ORDER — CLOPIDOGREL BISULFATE 75 MG/1
TABLET ORAL DAILY
COMMUNITY
End: 2018-10-26

## 2017-10-11 RX ORDER — METOPROLOL SUCCINATE 25 MG/1
25 TABLET, EXTENDED RELEASE ORAL DAILY
COMMUNITY
End: 2024-08-30

## 2017-10-11 RX ORDER — LOSARTAN POTASSIUM 25 MG/1
25 TABLET ORAL DAILY
COMMUNITY
End: 2017-10-11

## 2017-10-11 RX ORDER — NITROGLYCERIN 80 MG/1
1 PATCH TRANSDERMAL DAILY
COMMUNITY
End: 2018-09-17

## 2017-10-11 RX ORDER — LOSARTAN POTASSIUM 50 MG/1
50 TABLET ORAL DAILY
Qty: 90 TABLET | Refills: 1 | Status: SHIPPED | OUTPATIENT
Start: 2017-10-11 | End: 2018-03-19

## 2017-10-11 RX ORDER — ATORVASTATIN CALCIUM 20 MG/1
20 TABLET, FILM COATED ORAL DAILY
COMMUNITY
End: 2018-08-13

## 2017-10-11 NOTE — PATIENT INSTRUCTIONS
"Please increase your losartan from 25mg daily to 50mg daily.   Please keep your appointment with the Cardiology provider on 10.13.17. Ask them to have a \"basic metabolic panel\" checked at that visit on 10.13.17, since we increased your losartan today (10.11.17).    For your knee pain: you can use acetaminophen (Tylenol) but you should avoid all: Excedrin, ibuprofen (aka Advil, Motrin) and Aleve (aka naproxen).     Please continue to see your PCP, Santino Gordon at regular intervals.   "

## 2017-10-11 NOTE — NURSING NOTE
"Chief Complaint   Patient presents with     [facility] F/U     [facility] heart attack with stent placement        Initial Pulse 53  Temp 97.6  F (36.4  C) (Oral)  Wt 138 lb (62.6 kg)  SpO2 97%  BMI 27.18 kg/m2 Estimated body mass index is 27.18 kg/(m^2) as calculated from the following:    Height as of 10/2/17: 4' 11.75\" (1.518 m).    Weight as of this encounter: 138 lb (62.6 kg).  Medication Reconciliation: complete   Lashon Glass M.A.      "

## 2017-10-11 NOTE — MR AVS SNAPSHOT
"              After Visit Summary   10/11/2017    Halle Saucedo    MRN: [id number]           Patient Information     Date Of Birth          3/8/1926        Visit Information        Provider Department      10/11/2017 12:00 PM Jannette Garcia MD [facility]        Today's Diagnoses     Benign essential hypertension    -  1      Care Instructions    Please increase your losartan from 25mg daily to 50mg daily.   Please keep your appointment with the Cardiology provider on 10.13.17. Ask them to have a \"basic metabolic panel\" checked at that visit on 10.13.17, since we increased your losartan today (10.11.17).    For your knee pain: you can use acetaminophen (Tylenol) but you should avoid all: Excedrin, ibuprofen (aka Advil, Motrin) and Aleve (aka naproxen).     Please continue to see your PCP, Santino Gordon at regular intervals.           Follow-ups after your visit        Who to contact     If you have questions or need follow up information about today's clinic visit or your schedule please contact [facility] directly at [phone].  Normal or non-critical lab and imaging results will be communicated to you by Idomoohart, letter or phone within 4 business days after the clinic has received the results. If you do not hear from us within 7 days, please contact the clinic through [employer] or phone. If you have a critical or abnormal lab result, we will notify you by phone as soon as possible.  Submit refill requests through Momspot or call your pharmacy and they will forward the refill request to us. Please allow 3 business days for your refill to be completed.          Additional Information About Your Visit        MyChart Information     Momspot lets you send messages to your doctor, view your test results, renew your prescriptions, schedule appointments and more. To sign up, go to [email or web address] . Click on \"Log in\" on the left side of the screen, which will take you to " "the Welcome page. Then click on \"Sign up Now\" on the right side of the page.     You will be asked to enter the access code listed below, as well as some personal information. Please follow the directions to create your username and password.     Your access code is: [email or web address]  Expires: 2017  3:12 PM     Your access code will  in 90 days. If you need help or a new code, please call your [facility] clinic or [phone].        Care EveryWhere ID     This is your Care EveryWhere ID. This could be used by other organizations to access your [facility] medical records  [id number]        Your Vitals Were     Pulse Temperature Pulse Oximetry BMI (Body Mass Index)          53 97.6  F (36.4  C) (Oral) 97% 27.18 kg/m2         Blood Pressure from Last 3 Encounters:   10/11/17 160/70   10/02/17 120/80   17 145/88    Weight from Last 3 Encounters:   10/11/17 138 lb (62.6 kg)   10/02/17 131 lb (59.4 kg)   17 136 lb (61.7 kg)              Today, you had the following     No orders found for display         Today's Medication Changes          These changes are accurate as of: 10/11/17  1:18 PM.  If you have any questions, ask your nurse or doctor.               These medicines have changed or have updated prescriptions.        Dose/Directions    losartan 50 MG tablet   Commonly known as:  COZAAR   This may have changed:    - medication strength  - how much to take   Used for:  Benign essential hypertension   Changed by:  Jannette Garcia MD        Dose:  50 mg   Take 1 tablet (50 mg) by mouth daily   Quantity:  90 tablet   Refills:  1       potassium chloride 10 MEQ tablet   Commonly known as:  K-TAB,KLOR-CON   This may have changed:    - how much to take  - when to take this  - additional instructions   Used for:  Hypertension goal BP (blood pressure) < 150/90        Two tabs daily Pharmacy ok to hold prescription until due   Quantity:  180 tablet   Refills:  1         Stop taking these " medicines if you haven't already. Please contact your care team if you have questions.     amLODIPine 5 MG tablet   Commonly known as:  NORVASC   Stopped by:  Jannette Garcia MD           losartan-hydrochlorothiazide 50-12.5 MG per tablet   Commonly known as:  HYZAAR   Stopped by:  Jannette Garcia MD           rosuvastatin 10 MG tablet   Commonly known as:  CRESTOR   Stopped by:  Jannette Garcia MD                Where to get your medicines      These medications were sent to [facility] [address] [facility] [address] [facility] AT [address] of [address]  [address] [facility] [address]     Phone:  [phone]     losartan 50 MG tablet                Primary Care Provider Office Phone # Fax #    Santino Peter Gordon [phone] [address] [facility] [address]        Equal Access to Services     [facility]: Hadii aad ku hadasho Soomaali, waaxda luqadaha, qaybta kaalmada adeegyada, waxay idiin hayaan adeeg kharaneftali la'hedy . So [facility] [phone].    ATENCIÓN: Si habla español, tiene a reza disposición servicios gratuitos de asistencia lingüística. [facility] [phone].    We comply with applicable federal civil rights laws and [place] laws. We do not discriminate on the basis of race, color, national origin, age, disability, sex, sexual orientation, or gender identity.            Thank you!     Thank you for choosing [facility]  for your care. Our goal is always to provide you with excellent care. Hearing back from our patients is one way we can continue to improve our services. Please take a few minutes to complete the written survey that you may receive in the mail after your visit with us. Thank you!             Your Updated Medication List - Protect others around you: Learn how to safely use, store and throw away your medicines at www.disposemymeds.org.          This list is accurate as of:  10/11/17  1:18 PM.  Always use your most recent med list.                   Brand Name Dispense Instructions for use Diagnosis    aspirin 81 MG tablet      Take 2 tablets by mouth daily.        atorvastatin 20 MG tablet    LIPITOR     Take 20 mg by mouth daily        clopidogrel 75 MG tablet    PLAVIX     Take by mouth daily        ELIQUIS 2.5 MG tablet   Generic drug:  apixaban ANTICOAGULANT      Take by mouth 2 times daily        furosemide 20 MG tablet    LASIX    135 tablet    Take 1 tablet (20 mg) by mouth daily For blood pressure in AM. Make take extra pill if needed Pharmacy ok to hold prescription until due    Hypertension goal BP (blood pressure) < 150/90       losartan 50 MG tablet    COZAAR    90 tablet    Take 1 tablet (50 mg) by mouth daily    Benign essential hypertension       LUMIGAN 0.01 % Soln   Generic drug:  bimatoprost           metoprolol 25 MG 24 hr tablet    TOPROL-XL     Take 25 mg by mouth daily        MULTI-VITAMIN PO      1 tab daily        nitroGLYcerin 0.4 MG/HR 24 hr patch    NITRODUR     Place 1 patch onto the skin daily        omeprazole 20 MG CR capsule    priLOSEC    30 capsule    Take 1 capsule (20 mg) by mouth daily x10 days then as needed for heartburn/upset stomach    Gastroesophageal reflux disease with esophagitis       potassium chloride 10 MEQ tablet    K-TAB,KLOR-CON    180 tablet    Two tabs daily Pharmacy ok to hold prescription until due    Hypertension goal BP (blood pressure) < 150/90       VITAMIN D PO      2 tabs daily        VITAMIN E      400mg 2 tabs daily

## 2017-10-12 ENCOUNTER — MEDICAL CORRESPONDENCE (OUTPATIENT)
Dept: HEALTH INFORMATION MANAGEMENT | Facility: CLINIC | Age: 82
End: 2017-10-12

## 2017-10-13 ENCOUNTER — TRANSFERRED RECORDS (OUTPATIENT)
Dept: HEALTH INFORMATION MANAGEMENT | Facility: CLINIC | Age: 82
End: 2017-10-13

## 2017-10-15 PROBLEM — I21.3 ST ELEVATION MYOCARDIAL INFARCTION (STEMI), UNSPECIFIED ARTERY (H): Status: ACTIVE | Noted: 2017-10-15

## 2017-10-15 PROBLEM — I48.91 ATRIAL FIBRILLATION, UNSPECIFIED TYPE (H): Status: ACTIVE | Noted: 2017-10-15

## 2017-10-18 ENCOUNTER — TELEPHONE (OUTPATIENT)
Dept: FAMILY MEDICINE | Facility: CLINIC | Age: 82
End: 2017-10-18

## 2017-10-18 NOTE — TELEPHONE ENCOUNTER
[facility] calling to get some orders for Physical Therapy for patient. Asking for 2-3 sessions total,  primarily addressing right knee pain and to work on her gait. Please call to advise. Thank you

## 2017-10-19 ENCOUNTER — MEDICAL CORRESPONDENCE (OUTPATIENT)
Dept: HEALTH INFORMATION MANAGEMENT | Facility: CLINIC | Age: 82
End: 2017-10-19

## 2017-10-20 ENCOUNTER — MEDICAL CORRESPONDENCE (OUTPATIENT)
Dept: HEALTH INFORMATION MANAGEMENT | Facility: CLINIC | Age: 82
End: 2017-10-20

## 2017-10-27 ENCOUNTER — MEDICAL CORRESPONDENCE (OUTPATIENT)
Dept: HEALTH INFORMATION MANAGEMENT | Facility: CLINIC | Age: 82
End: 2017-10-27

## 2017-10-31 ENCOUNTER — TRANSFERRED RECORDS (OUTPATIENT)
Dept: HEALTH INFORMATION MANAGEMENT | Facility: CLINIC | Age: 82
End: 2017-10-31

## 2017-11-02 ENCOUNTER — MEDICAL CORRESPONDENCE (OUTPATIENT)
Dept: HEALTH INFORMATION MANAGEMENT | Facility: CLINIC | Age: 82
End: 2017-11-02

## 2017-11-09 ENCOUNTER — MEDICAL CORRESPONDENCE (OUTPATIENT)
Dept: HEALTH INFORMATION MANAGEMENT | Facility: CLINIC | Age: 82
End: 2017-11-09

## 2017-11-15 ENCOUNTER — MEDICAL CORRESPONDENCE (OUTPATIENT)
Dept: HEALTH INFORMATION MANAGEMENT | Facility: CLINIC | Age: 82
End: 2017-11-15

## 2017-11-16 ENCOUNTER — MEDICAL CORRESPONDENCE (OUTPATIENT)
Dept: HEALTH INFORMATION MANAGEMENT | Facility: CLINIC | Age: 82
End: 2017-11-16

## 2017-11-22 ENCOUNTER — TRANSFERRED RECORDS (OUTPATIENT)
Dept: HEALTH INFORMATION MANAGEMENT | Facility: CLINIC | Age: 82
End: 2017-11-22

## 2017-12-17 ENCOUNTER — RADIANT APPOINTMENT (OUTPATIENT)
Dept: GENERAL RADIOLOGY | Facility: CLINIC | Age: 82
End: 2017-12-17
Attending: FAMILY MEDICINE
Payer: COMMERCIAL

## 2017-12-17 ENCOUNTER — OFFICE VISIT (OUTPATIENT)
Dept: URGENT CARE | Facility: URGENT CARE | Age: 82
End: 2017-12-17
Payer: COMMERCIAL

## 2017-12-17 VITALS
OXYGEN SATURATION: 96 % | DIASTOLIC BLOOD PRESSURE: 72 MMHG | HEIGHT: 60 IN | HEART RATE: 74 BPM | WEIGHT: 131.13 LBS | SYSTOLIC BLOOD PRESSURE: 158 MMHG | BODY MASS INDEX: 25.74 KG/M2

## 2017-12-17 DIAGNOSIS — R06.89 DECREASED BREATH SOUNDS: ICD-10-CM

## 2017-12-17 DIAGNOSIS — I21.3 ST ELEVATION MYOCARDIAL INFARCTION (STEMI), UNSPECIFIED ARTERY (H): ICD-10-CM

## 2017-12-17 DIAGNOSIS — R05.9 COUGH: Primary | ICD-10-CM

## 2017-12-17 DIAGNOSIS — J20.9 ACUTE BRONCHITIS WITH COEXISTING CONDITION REQUIRING PROPHYLACTIC TREATMENT: ICD-10-CM

## 2017-12-17 PROCEDURE — 71020 XR CHEST 2 VW: CPT

## 2017-12-17 PROCEDURE — 99214 OFFICE O/P EST MOD 30 MIN: CPT | Performed by: FAMILY MEDICINE

## 2017-12-17 RX ORDER — AMOXICILLIN 875 MG
875 TABLET ORAL 2 TIMES DAILY
Qty: 20 TABLET | Refills: 0 | Status: SHIPPED | OUTPATIENT
Start: 2017-12-17 | End: 2017-12-27

## 2017-12-17 NOTE — MR AVS SNAPSHOT
"              After Visit Summary   2017    Halle Saucedo    MRN: [id number]           Patient Information     Date Of Birth          3/8/1926        Visit Information        Provider Department      2017 9:15 AM Griselda Ridley MD [facility]        Today's Diagnoses     Cough    -  1    Decreased breath sounds        Acute bronchitis with coexisting condition requiring prophylactic treatment        ST elevation myocardial infarction (STEMI), unspecified artery (H)           Follow-ups after your visit        Who to contact     If you have questions or need follow up information about today's clinic visit or your schedule please contact [facility] directly at [phone].  Normal or non-critical lab and imaging results will be communicated to you by MyChart, letter or phone within 4 business days after the clinic has received the results. If you do not hear from us within 7 days, please contact the clinic through MyChart or phone. If you have a critical or abnormal lab result, we will notify you by phone as soon as possible.  Submit refill requests through [employer] or call your pharmacy and they will forward the refill request to us. Please allow 3 business days for your refill to be completed.          Additional Information About Your Visit        MyChart Information     [employer] lets you send messages to your doctor, view your test results, renew your prescriptions, schedule appointments and more. To sign up, go to [email or web address] . Click on \"Log in\" on the left side of the screen, which will take you to the Welcome page. Then click on \"Sign up Now\" on the right side of the page.     You will be asked to enter the access code listed below, as well as some personal information. Please follow the directions to create your username and password.     Your access code is: [email or web address]  Expires: 3/17/2018 12:38 PM     Your access code will  in 90 days. If you " "need help or a new code, please call your [facility] clinic or [phone].        Care EveryWhere ID     This is your Care EveryWhere ID. This could be used by other organizations to access your [facility] medical records  [id number]        Your Vitals Were     Pulse Height Pulse Oximetry BMI (Body Mass Index)          74 4' 11.75\" (1.518 m) 96% 25.82 kg/m2         Blood Pressure from Last 3 Encounters:   12/17/17 158/72   10/11/17 160/70   10/02/17 120/80    Weight from Last 3 Encounters:   12/17/17 131 lb 2 oz (59.5 kg)   10/11/17 138 lb (62.6 kg)   10/02/17 131 lb (59.4 kg)              We Performed the Following     XR Chest 2 Views          Today's Medication Changes          These changes are accurate as of: 12/17/17 12:38 PM.  If you have any questions, ask your nurse or doctor.               Start taking these medicines.        Dose/Directions    amoxicillin 875 MG tablet   Commonly known as:  AMOXIL   Used for:  Acute bronchitis with coexisting condition requiring prophylactic treatment   Started by:  Griselda Ridley MD        Dose:  875 mg   Take 1 tablet (875 mg) by mouth 2 times daily for 10 days   Quantity:  20 tablet   Refills:  0         These medicines have changed or have updated prescriptions.        Dose/Directions    furosemide 20 MG tablet   Commonly known as:  LASIX   This may have changed:    - how much to take  - additional instructions   Used for:  Hypertension goal BP (blood pressure) < 150/90        Dose:  20 mg   Take 1 tablet (20 mg) by mouth daily For blood pressure in AM. Make take extra pill if needed Pharmacy ok to hold prescription until due   Quantity:  135 tablet   Refills:  1       potassium chloride 10 MEQ tablet   Commonly known as:  K-TAB,KLOR-CON   This may have changed:    - how much to take  - when to take this  - additional instructions   Used for:  Hypertension goal BP (blood pressure) < 150/90        Two tabs daily Pharmacy ok to hold prescription until due "   Quantity:  180 tablet   Refills:  1            Where to get your medicines      These medications were sent to [id number] - [facility] [address] AT [address]  [address] [facility] [address] [facility] [address]     Phone:  [phone]     amoxicillin 875 MG tablet                Primary Care Provider Office Phone # Fax #    Santino Peter Gordon [phone] [address] [facility] [address]        Equal Access to Services     [facility]: Hadii aad ku hadasho Soomaali, waaxda luqadaha, qaybta kaalmada adeegyada, waxay idiin hayaan adeclementina rosado . So [facility] [phone].    ATENCIÓN: Si habla español, tiene a reza disposición servicios gratuitos de asistencia lingüística. [facility] [phone].    We comply with applicable federal civil rights laws and [place] laws. We do not discriminate on the basis of race, color, national origin, age, disability, sex, sexual orientation, or gender identity.            Thank you!     Thank you for choosing [facility]  for your care. Our goal is always to provide you with excellent care. Hearing back from our patients is one way we can continue to improve our services. Please take a few minutes to complete the written survey that you may receive in the mail after your visit with us. Thank you!             Your Updated Medication List - Protect others around you: Learn how to safely use, store and throw away your medicines at www.disposemymeds.org.          This list is accurate as of: 12/17/17 12:38 PM.  Always use your most recent med list.                   Brand Name Dispense Instructions for use Diagnosis    amoxicillin 875 MG tablet    AMOXIL    20 tablet    Take 1 tablet (875 mg) by mouth 2 times daily for 10 days    Acute bronchitis with coexisting condition requiring prophylactic treatment       atorvastatin 20 MG tablet    LIPITOR     Take 20 mg by mouth daily        clopidogrel  75 MG tablet    PLAVIX     Take by mouth daily        ELIQUIS 2.5 MG tablet   Generic drug:  apixaban ANTICOAGULANT      Take by mouth 2 times daily        furosemide 20 MG tablet    LASIX    135 tablet    Take 1 tablet (20 mg) by mouth daily For blood pressure in AM. Make take extra pill if needed Pharmacy ok to hold prescription until due    Hypertension goal BP (blood pressure) < 150/90       losartan 50 MG tablet    COZAAR    90 tablet    Take 1 tablet (50 mg) by mouth daily    Benign essential hypertension       LUMIGAN 0.01 % Soln   Generic drug:  bimatoprost           metoprolol 25 MG 24 hr tablet    TOPROL-XL     Take 25 mg by mouth daily        MULTI-VITAMIN PO      1 tab daily        nitroGLYcerin 0.4 MG/HR 24 hr patch    NITRODUR     Place 1 patch onto the skin daily        omeprazole 20 MG CR capsule    priLOSEC    30 capsule    Take 1 capsule (20 mg) by mouth daily x10 days then as needed for heartburn/upset stomach    Gastroesophageal reflux disease with esophagitis       potassium chloride 10 MEQ tablet    K-TAB,KLOR-CON    180 tablet    Two tabs daily Pharmacy ok to hold prescription until due    Hypertension goal BP (blood pressure) < 150/90       VITAMIN D PO      2 tabs daily        VITAMIN E      400mg 2 tabs daily

## 2017-12-17 NOTE — PROGRESS NOTES
"    SUBJECTIVE:                                                    Halle Saucedo is a 89 year old female who presents to clinic today for the following health issues:  Accompanied by daughter    ENT Symptoms             Symptoms: cc Present Absent Comment   Fever/Chills   x    Fatigue   x    Muscle Aches   x    Eye Irritation  x     Sneezing  x      Nasal Kiran/Drg  x     Sinus Pressure/Pain   x    Loss of smell   x    Dental pain   x    Sore Throat   x Did early on but not now   Swollen Glands   x    Ear Pain/Fullness   x    Cough x   Productive   Wheeze   x    Chest Pain   x    Shortness of breath   x    Rash   x    Other         Symptom duration:  2 weeks ago   Symptom severity:  severe with coughing   Treatments tried:  none - not sure what she can take with all her medicine.   Contacts:  none     Recently had a heart attack  Started with a sore throat 2 weeks ago.  Thought she got it from her friend  Got progressively worse coughing some nasal discharge   Coughing is severe would have severe coughing fits  Lot of phegm drainage     97.1 Temp afebrile  Fever No  Sinus congestion/sinus pain No  Wheezing: No  Chest pain or exertional shortness of breath: NO   Exposure to pertussis or pertussis like symptoms: No  Orthopnea, worsening edema, pnd: NO  Rash: NO  Tried OTC medications without relief  No hemoptysis.  Worsening symptoms hence patient came in to be seen     Problem list and histories reviewed & adjusted, as indicated.  Additional history: as documented    Problem list, Medication list, Allergies, and Medical/Social/Surgical histories reviewed in EPIC and updated as appropriate.    ROS:  Constitutional, HEENT, cardiovascular, pulmonary, gi and gu systems are negative, except as otherwise noted.    OBJECTIVE:                                                    /72 (BP Location: Right arm, Patient Position: Sitting, Cuff Size: Adult Regular)  Pulse 74  Temp (P) 97.1  F (36.2  C) (Oral)  Ht 4' 11.75\" " (1.518 m)  Wt 131 lb 2 oz (59.5 kg)  SpO2 96%  BMI 25.82 kg/m2  Body mass index is 25.82 kg/(m^2).  GENERAL: healthy, alert and no distress  EYES: pink palpebral conjunctiva, anicteric sclera  ENT: midline nasal septum normal ear exam. Mild congestion sinuses.   Mouth: moist buccal mucosa nonhyperemic posterior pharyngeal wall. No tonsillar enlargement or cellulitis  NECK: no adenopathy, no asymmetry, masses, or scars and thyroid normal to palpation  RESP: lungs clear to auscultation - No  rales, crackles, rhonchi or wheezes BUT DECREASED BREATH SOUNDS BILATERAL BASES    CV: regular rate and rhythm, normal S1 S2, no S3 or S4,  No murmurs, click or rub  SKIN: no visible rashes noted  Pscyh: Appropriate mood and affect  MS: no gross musculoskeletal defects noted    Diagnostic Test Results:  Results for orders placed or performed in visit on 12/17/17 (from the past 24 hour(s))   XR Chest 2 Views    Narrative    CHEST TWO VIEWS   12/17/2017 10:20 AM     HISTORY:  Cough. Decreased breath sounds.    COMPARISON: 8/16/2017.    FINDINGS: Costochondral calcification. Heart size normal. Lungs clear.  No interval change.      Impression    IMPRESSION: Nothing acute and no interval change.    VIDA SIDHU MD        ASSESSMENT/PLAN:                                                        ICD-10-CM    1. Cough R05 XR Chest 2 Views   2. Decreased breath sounds R06.89 XR Chest 2 Views   3. Acute bronchitis with coexisting condition requiring prophylactic treatment J20.9 amoxicillin (AMOXIL) 875 MG tablet   4. ST elevation myocardial infarction (STEMI), unspecified artery (H) I21.3        Prescribed with amoxicillin  Patient denies any cardiac symptoms - given patient history these were discussed in detail   Alarm signs or symptoms discussed, if present recommend go to ER   Adverse reactions of medications discussed.  Over the counter medications discussed.   Aware to come back in if with worsening symptoms or if no relief  despite treatment plan  Patient voiced understanding and had no further questions.     MD Griselda Dave MD  [facility]

## 2018-01-12 ENCOUNTER — OFFICE VISIT (OUTPATIENT)
Dept: FAMILY MEDICINE | Facility: CLINIC | Age: 83
End: 2018-01-12
Payer: COMMERCIAL

## 2018-01-12 VITALS
TEMPERATURE: 96.6 F | OXYGEN SATURATION: 97 % | DIASTOLIC BLOOD PRESSURE: 82 MMHG | BODY MASS INDEX: 25.72 KG/M2 | SYSTOLIC BLOOD PRESSURE: 130 MMHG | HEART RATE: 62 BPM | HEIGHT: 60 IN | WEIGHT: 131 LBS

## 2018-01-12 DIAGNOSIS — I10 HYPERTENSION GOAL BP (BLOOD PRESSURE) < 150/90: ICD-10-CM

## 2018-01-12 DIAGNOSIS — Z23 NEED FOR PROPHYLACTIC VACCINATION AND INOCULATION AGAINST INFLUENZA: ICD-10-CM

## 2018-01-12 DIAGNOSIS — E78.5 HYPERLIPIDEMIA LDL GOAL <130: ICD-10-CM

## 2018-01-12 DIAGNOSIS — I48.91 ATRIAL FIBRILLATION, UNSPECIFIED TYPE (H): ICD-10-CM

## 2018-01-12 DIAGNOSIS — Z23 PNEUMOCOCCAL VACCINATION ADMINISTERED AT CURRENT VISIT: ICD-10-CM

## 2018-01-12 DIAGNOSIS — E03.8 OTHER SPECIFIED HYPOTHYROIDISM: ICD-10-CM

## 2018-01-12 DIAGNOSIS — I21.3 ST ELEVATION MYOCARDIAL INFARCTION (STEMI), UNSPECIFIED ARTERY (H): Primary | ICD-10-CM

## 2018-01-12 PROCEDURE — G0009 ADMIN PNEUMOCOCCAL VACCINE: HCPCS | Performed by: FAMILY MEDICINE

## 2018-01-12 PROCEDURE — 90662 IIV NO PRSV INCREASED AG IM: CPT | Performed by: FAMILY MEDICINE

## 2018-01-12 PROCEDURE — 99214 OFFICE O/P EST MOD 30 MIN: CPT | Mod: 25 | Performed by: FAMILY MEDICINE

## 2018-01-12 PROCEDURE — 90670 PCV13 VACCINE IM: CPT | Performed by: FAMILY MEDICINE

## 2018-01-12 PROCEDURE — G0008 ADMIN INFLUENZA VIRUS VAC: HCPCS | Performed by: FAMILY MEDICINE

## 2018-01-12 RX ORDER — ATORVASTATIN CALCIUM 20 MG/1
20 TABLET, FILM COATED ORAL DAILY
Qty: 30 TABLET | Status: CANCELLED | OUTPATIENT
Start: 2018-01-12

## 2018-01-12 RX ORDER — ATORVASTATIN CALCIUM 40 MG/1
40 TABLET, FILM COATED ORAL DAILY
Qty: 90 TABLET | Refills: 3 | Status: SHIPPED | OUTPATIENT
Start: 2018-01-12 | End: 2019-01-21

## 2018-01-12 NOTE — PROGRESS NOTES
blood pressure, chol check    Injectable Influenza Immunization Documentation    1.  Is the person to be vaccinated sick today?   No    2. Does the person to be vaccinated have an allergy to a component   of the vaccine?   No  Egg Allergy Algorithm Link    3. Has the person to be vaccinated ever had a serious reaction   to influenza vaccine in the past?   No    4. Has the person to be vaccinated ever had Guillain-Barré syndrome?   No    Form completed by Keysha Kumar CMA

## 2018-01-12 NOTE — MR AVS SNAPSHOT
"              After Visit Summary   1/12/2018    Halle Saucedo    MRN: [id number]           Patient Information     Date Of Birth          3/8/1926        Visit Information        Provider Department      1/12/2018 2:00 PM Santino Gordon MD [facility]        Today's Diagnoses     ST elevation myocardial infarction (STEMI), unspecified artery (H)    -  1    Atrial fibrillation, unspecified type (H)        Other specified hypothyroidism        Hypertension goal BP (blood pressure) < 150/90        Hyperlipidemia LDL goal <130        Pneumococcal vaccination administered at current visit        Need for prophylactic vaccination and inoculation against influenza           Follow-ups after your visit        Who to contact     If you have questions or need follow up information about today's clinic visit or your schedule please contact [facility] directly at [phone].  Normal or non-critical lab and imaging results will be communicated to you by [employer], letter or phone within 4 business days after the clinic has received the results. If you do not hear from us within 7 days, please contact the clinic through [employer] or phone. If you have a critical or abnormal lab result, we will notify you by phone as soon as possible.  Submit refill requests through [employer] or call your pharmacy and they will forward the refill request to us. Please allow 3 business days for your refill to be completed.          Additional Information About Your Visit        MyChart Information     [employer] lets you send messages to your doctor, view your test results, renew your prescriptions, schedule appointments and more. To sign up, go to [email or web address] . Click on \"Log in\" on the left side of the screen, which will take you to the Welcome page. Then click on \"Sign up Now\" on the right side of the page.     You will be asked to enter the access code listed below, as well as some personal information. Please " "follow the directions to create your username and password.     Your access code is: [email or web address]  Expires: 3/17/2018 12:38 PM     Your access code will  in 90 days. If you need help or a new code, please call your [place] clinic or [phone].        Care EveryWhere ID     This is your Care EveryWhere ID. This could be used by other organizations to access your [place] medical records  [id number]        Your Vitals Were     Pulse Temperature Height Pulse Oximetry BMI (Body Mass Index)       62 96.6  F (35.9  C) (Oral) 4' 11.75\" (1.518 m) 97% 25.8 kg/m2        Blood Pressure from Last 3 Encounters:   18 130/82   17 158/72   10/11/17 160/70    Weight from Last 3 Encounters:   18 131 lb (59.4 kg)   17 131 lb 2 oz (59.5 kg)   10/11/17 138 lb (62.6 kg)              We Performed the Following     ADMIN: Vaccine, Initial (39406)     FLU VACCINE, INCREASED ANTIGEN, PRESV FREE, AGE 65+ [94043]     Pneumococcal vaccine 13 valent PCV13 IM (Prevnar) [43271]     Vaccine Administration, Initial [65323]          Today's Medication Changes          These changes are accurate as of: 18  2:27 PM.  If you have any questions, ask your nurse or doctor.               These medicines have changed or have updated prescriptions.        Dose/Directions    * atorvastatin 20 MG tablet   Commonly known as:  LIPITOR   This may have changed:  Another medication with the same name was added. Make sure you understand how and when to take each.        Dose:  20 mg   Take 20 mg by mouth daily   Refills:  0       * atorvastatin 40 MG tablet   Commonly known as:  LIPITOR   This may have changed:  You were already taking a medication with the same name, and this prescription was added. Make sure you understand how and when to take each.   Used for:  Hyperlipidemia LDL goal <130        Dose:  40 mg   Take 1 tablet (40 mg) by mouth daily This is double dosage for cholesterol. Back to 20mg (1/2 pill) if bad body " aches.   Quantity:  90 tablet   Refills:  3       furosemide 20 MG tablet   Commonly known as:  LASIX   This may have changed:    - how much to take  - additional instructions   Used for:  Hypertension goal BP (blood pressure) < 150/90        Dose:  20 mg   Take 1 tablet (20 mg) by mouth daily For blood pressure in AM. Make take extra pill if needed Pharmacy ok to hold prescription until due   Quantity:  135 tablet   Refills:  1       potassium chloride 10 MEQ tablet   Commonly known as:  K-TAB,KLOR-CON   This may have changed:    - how much to take  - when to take this  - additional instructions   Used for:  Hypertension goal BP (blood pressure) < 150/90        Two tabs daily Pharmacy ok to hold prescription until due   Quantity:  180 tablet   Refills:  1       * Notice:  This list has 2 medication(s) that are the same as other medications prescribed for you. Read the directions carefully, and ask your doctor or other care provider to review them with you.         Where to get your medicines      These medications were sent to [facility] [address] [place] [address] [address] [facility] [address]  [address] [facility] [address]     Phone:  [phone]     atorvastatin 40 MG tablet                Primary Care Provider Office Phone # Fax #    Santino Gordon [phone] [address] [facility] [address]        Equal Access to Services     YANIRA MCCALL : Hadii ginny ku hadasho Soomaali, waaxda luqadaha, qaybta kaalmada adeegyada, jai tang. So [facility] [phone].    ATENCIÓN: Si habla español, tiene a reza disposición servicios gratuitos de asistencia lingüística. [facility] [phone].    We comply with applicable federal civil rights laws and [place] laws. We do not discriminate on the basis of race, color, national origin, age, disability, sex, sexual orientation, or gender identity.            Thank you!      Thank you for choosing [facility]  for your care. Our goal is always to provide you with excellent care. Hearing back from our patients is one way we can continue to improve our services. Please take a few minutes to complete the written survey that you may receive in the mail after your visit with us. Thank you!             Your Updated Medication List - Protect others around you: Learn how to safely use, store and throw away your medicines at www.disposemymeds.org.          This list is accurate as of: 1/12/18  2:27 PM.  Always use your most recent med list.                   Brand Name Dispense Instructions for use Diagnosis    * atorvastatin 20 MG tablet    LIPITOR     Take 20 mg by mouth daily        * atorvastatin 40 MG tablet    LIPITOR    90 tablet    Take 1 tablet (40 mg) by mouth daily This is double dosage for cholesterol. Back to 20mg (1/2 pill) if bad body aches.    Hyperlipidemia LDL goal <130       clopidogrel 75 MG tablet    PLAVIX     Take by mouth daily        ELIQUIS 2.5 MG tablet   Generic drug:  apixaban ANTICOAGULANT      Take by mouth 2 times daily        furosemide 20 MG tablet    LASIX    135 tablet    Take 1 tablet (20 mg) by mouth daily For blood pressure in AM. Make take extra pill if needed Pharmacy ok to hold prescription until due    Hypertension goal BP (blood pressure) < 150/90       losartan 50 MG tablet    COZAAR    90 tablet    Take 1 tablet (50 mg) by mouth daily    Benign essential hypertension       LUMIGAN 0.01 % Soln   Generic drug:  bimatoprost           metoprolol succinate 25 MG 24 hr tablet    TOPROL-XL     Take 25 mg by mouth daily        MULTI-VITAMIN PO      1 tab daily        nitroGLYcerin 0.4 MG/HR 24 hr patch    NITRODUR     Place 1 patch onto the skin daily        omeprazole 20 MG CR capsule    priLOSEC    30 capsule    Take 1 capsule (20 mg) by mouth daily x10 days then as needed for heartburn/upset stomach    Gastroesophageal reflux disease with  esophagitis       potassium chloride 10 MEQ tablet    K-TAB,KLOR-CON    180 tablet    Two tabs daily Pharmacy ok to hold prescription until due    Hypertension goal BP (blood pressure) < 150/90       VITAMIN D PO      2 tabs daily        VITAMIN E      400mg 2 tabs daily        * Notice:  This list has 2 medication(s) that are the same as other medications prescribed for you. Read the directions carefully, and ask your doctor or other care provider to review them with you.

## 2018-01-12 NOTE — NURSING NOTE
"Chief Complaint   Patient presents with     Hypertension     Lipids       Initial /82  Pulse 62  Temp 96.6  F (35.9  C) (Oral)  Ht 4' 11.75\" (1.518 m)  Wt 131 lb (59.4 kg)  SpO2 97%  BMI 25.8 kg/m2 Estimated body mass index is 25.8 kg/(m^2) as calculated from the following:    Height as of this encounter: 4' 11.75\" (1.518 m).    Weight as of this encounter: 131 lb (59.4 kg).  Medication Reconciliation: complete   Keysha Kumar CMA  Screening Questionnaire for Adult Immunization    Are you sick today?   No   Do you have allergies to medications, food, a vaccine component or latex?   Yes   Have you ever had a serious reaction after receiving a vaccination?   No   Do you have a long-term health problem with heart disease, lung disease, asthma, kidney disease, metabolic disease (e.g. diabetes), anemia, or other blood disorder?   No   Do you have cancer, leukemia, HIV/AIDS, or any other immune system problem?   No   In the past 3 months, have you taken medications that affect  your immune system, such as prednisone, other steroids, or anticancer drugs; drugs for the treatment of rheumatoid arthritis, Crohn s disease, or psoriasis; or have you had radiation treatments?   No   Have you had a seizure, or a brain or other nervous system problem?   No   During the past year, have you received a transfusion of blood or blood     products, or been given immune (gamma) globulin or antiviral drug?   No   For women: Are you pregnant or is there a chance you could become        pregnant during the next month?   No   Have you received any vaccinations in the past 4 weeks?   No     Immunization questionnaire was positive for at least one answer.  Notified see allergies.        Per orders of Dr. Gordon, injection of Prevnar 13  given by Keysha Kumar. Patient instructed to remain in clinic for 15 minutes afterwards, and to report any adverse reaction to me immediately.       Screening performed by Keysha Kumar on 1/12/2018 at " 2:06 PM.

## 2018-01-12 NOTE — PROGRESS NOTES
"SUBJECTIVE:  Halle Saucedo, a 89 year old female scheduled an appointment to discuss the following issues:  Pneumococcal vaccination administered at current visit  Follow-up mild hypothyroidism, high cholesterol and htn. 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   Did cardiac rehab. Outside blood pressure stable. No chest pain or shortness of breath. Emotionally doing. Runs cold. No nausea, vomiting or diarrhea. No black or bloody stools.   No omeprazole needed.   Medical, social, surgical, and family histories reviewed.    ROS:  All other ROS negative.   OBJECTIVE:  /82  Pulse 62  Temp 96.6  F (35.9  C) (Oral)  Ht 4' 11.75\" (1.518 m)  Wt 131 lb (59.4 kg)  SpO2 97%  BMI 25.8 kg/m2  EXAM:  GENERAL APPEARANCE: healthy, alert and no distress  NECK: no adenopathy, no asymmetry, masses, or scars and thyroid normal to palpation  RESP: lungs clear to auscultation - no rales, rhonchi or wheezes  CV: regular rates and rhythm, normal S1 S2, no S3 or S4 and no murmur, click or rub -  ABDOMEN:  soft, nontender, no HSM or masses and bowel sounds normal  MS: extremities normal- no gross deformities noted, no evidence of inflammation in joints, FROM in all extremities.  PSYCH: mentation appears normal and affect normal/bright    ASSESSMENT / PLAN:  (Z23) Pneumococcal vaccination administered at current visit  (primary encounter diagnosis)  Plan: Pneumococcal vaccine 13 valent PCV13 IM         (Prevnar) [87014], ADMIN: Vaccine, Initial         (21469)            (Z23) Need for prophylactic vaccination and inoculation against influenza  Plan: FLU VACCINE, INCREASED ANTIGEN, PRESV FREE, AGE        65+ [71169], Vaccine Administration, Initial         [09441]            (I21.3) ST elevation myocardial infarction (STEMI), unspecified artery (H)  Comment: doing well  Plan: per cardiology.   No nitro needed.     (I48.91) Atrial fibrillation, unspecified type (H)  Comment: stable rate  Plan: continue " blood thinners.     (E03.8) Other specified hypothyroidism  Plan: patient deferred labs today. Can get through myself or per cards labs next month.     (I10) Hypertension goal BP (blood pressure) < 150/90  Comment: stable  Plan: continue meds and self-monitor.     (E78.5) Hyperlipidemia LDL goal <130  Plan: atorvastatin (LIPITOR) 40 MG tablet        Agree with cardiology to increase from 20mg to 40mg if can tolerant. Back to 1/2 pill =20mg if body aches. Labs per cardiology.    Santino Gordon MD

## 2018-01-17 ENCOUNTER — TRANSFERRED RECORDS (OUTPATIENT)
Dept: HEALTH INFORMATION MANAGEMENT | Facility: CLINIC | Age: 83
End: 2018-01-17

## 2018-01-23 ENCOUNTER — TELEPHONE (OUTPATIENT)
Dept: FAMILY MEDICINE | Facility: CLINIC | Age: 83
End: 2018-01-23

## 2018-01-23 NOTE — TELEPHONE ENCOUNTER
Dianne patient's daughter is calling since patient is hard of hearing would like her handicapped parking be extended wondering how they get the paperwork to start this  Please call to discuss  Thank you

## 2018-01-23 NOTE — TELEPHONE ENCOUNTER
Called and spoke to Dianne, she would like the form mailed to patient's home, done.Mere Rodrigez MA/TC

## 2018-01-23 NOTE — TELEPHONE ENCOUNTER
Application for disability Parking Certificate placed in your basket to complete if you agree with.Mere Rodrigez MA/RUSLAN

## 2018-02-01 ENCOUNTER — TELEPHONE (OUTPATIENT)
Dept: FAMILY MEDICINE | Facility: CLINIC | Age: 83
End: 2018-02-01

## 2018-02-01 NOTE — TELEPHONE ENCOUNTER
Atorvastatin 40mg, Eloquis 2.5mg, Clopidigrel 75mg,  Metoprolol 25mg Patient has lost these prescriptions and now is in need of refills. Please call patient, or if she cannot be reached, call her son Destin at [phone]. Thank you

## 2018-02-02 NOTE — TELEPHONE ENCOUNTER
Pt advised Dr. Gordon only prescribes the atorvastatin, the other meds are from her cardiologist.  Pt states she has never lost medication before but will be out by Monday.  Advised there is refill available of atorvastatin.  Advised I would call pharmacy and have them work on getting refills for her. Pt states she will pay cash if needed.   Pharmacy notified pt lost these medications.  They will work on obtaining refills and waiver for coverage from insurance and let pt and/or pt son know response from insurance.  Keysha Juárez RN

## 2018-03-19 ENCOUNTER — TELEPHONE (OUTPATIENT)
Dept: FAMILY MEDICINE | Facility: CLINIC | Age: 83
End: 2018-03-19

## 2018-03-19 DIAGNOSIS — I10 HYPERTENSION GOAL BP (BLOOD PRESSURE) < 150/90: ICD-10-CM

## 2018-03-19 DIAGNOSIS — I10 BENIGN ESSENTIAL HYPERTENSION: ICD-10-CM

## 2018-03-19 RX ORDER — LOSARTAN POTASSIUM 50 MG/1
50 TABLET ORAL
Qty: 180 TABLET | Refills: 1 | Status: SHIPPED | OUTPATIENT
Start: 2018-03-19 | End: 2019-01-21

## 2018-03-19 RX ORDER — POTASSIUM CHLORIDE 750 MG/1
10 TABLET, EXTENDED RELEASE ORAL 2 TIMES DAILY
Qty: 180 TABLET | Refills: 1 | Status: SHIPPED | OUTPATIENT
Start: 2018-03-19 | End: 2018-10-08

## 2018-03-19 NOTE — TELEPHONE ENCOUNTER
1)  Pt states at her last refill they gave her prescription for losartan-hctz she did not start this.  She has been taking losartan 50 mg pills and is requesting refill of these.    2) pt requesting refill of potassium.    Pt due for labs but you saw her in Jan.  Please advise on which losartan pill to send and what dispense amount for both medications and when follow up is due.  Keysha Juárez RN

## 2018-03-19 NOTE — TELEPHONE ENCOUNTER
Pt notified of provider message as written.  Pt verbalized good understanding.  Keysha Juárez RN

## 2018-03-19 NOTE — TELEPHONE ENCOUNTER
Ok refills. Wrote for cozaar without hctz. Can do once daily intstead of BID if blood pressure too low. Potassium did too. Santino Gordon MD

## 2018-04-23 ENCOUNTER — TRANSFERRED RECORDS (OUTPATIENT)
Dept: HEALTH INFORMATION MANAGEMENT | Facility: CLINIC | Age: 83
End: 2018-04-23

## 2018-04-30 DIAGNOSIS — I25.10 CORONARY ARTERY DISEASE: Primary | ICD-10-CM

## 2018-04-30 LAB
CHOLEST SERPL-MCNC: 153 MG/DL
HDLC SERPL-MCNC: 73 MG/DL
LDLC SERPL CALC-MCNC: 63 MG/DL
NONHDLC SERPL-MCNC: 80 MG/DL
TRIGL SERPL-MCNC: 86 MG/DL

## 2018-04-30 PROCEDURE — 80061 LIPID PANEL: CPT | Performed by: INTERNAL MEDICINE

## 2018-04-30 PROCEDURE — 36415 COLL VENOUS BLD VENIPUNCTURE: CPT | Performed by: INTERNAL MEDICINE

## 2018-05-29 ENCOUNTER — TELEPHONE (OUTPATIENT)
Dept: FAMILY MEDICINE | Facility: CLINIC | Age: 83
End: 2018-05-29

## 2018-05-29 NOTE — TELEPHONE ENCOUNTER
"Patient states a little over a month ago, Losartan was changed to 50mg BID, patient had been taking it just once daily before that.   Patient has had side effects taking it BID  - making head \"fuzzy\", unstable with walking  Did not take med at all this am and feels good    Blood pressures =   AM - 5/27 - 121/73          5/28 - 133/79          5/29 - 131/85  PM - 5/25 - 115/71          5/26 - 115/82          5/27 - 111/80          5/28 - 120/82    Blood pressure this am - 131/85    To provider to advise  .Stefania Cox BSN, RN, CPN    "

## 2018-05-29 NOTE — TELEPHONE ENCOUNTER
Patient informed of provider note as below.  Patient verbalized understanding  Stefania YEN, RN, CPN

## 2018-05-29 NOTE — TELEPHONE ENCOUNTER
Patient is calling to speak with nurse about medication reaction losartan (COZAAR) 50 MG tablet becomes dizzy, fuzzy feeling wondering if she needs to stop this or at least take less amount.   Please call to advice   Thank you

## 2018-08-13 ENCOUNTER — OFFICE VISIT (OUTPATIENT)
Dept: FAMILY MEDICINE | Facility: CLINIC | Age: 83
End: 2018-08-13
Payer: COMMERCIAL

## 2018-08-13 VITALS
DIASTOLIC BLOOD PRESSURE: 88 MMHG | HEIGHT: 60 IN | SYSTOLIC BLOOD PRESSURE: 146 MMHG | TEMPERATURE: 97.6 F | WEIGHT: 131 LBS | BODY MASS INDEX: 25.72 KG/M2 | HEART RATE: 66 BPM | OXYGEN SATURATION: 98 % | RESPIRATION RATE: 24 BRPM

## 2018-08-13 DIAGNOSIS — E03.8 OTHER SPECIFIED HYPOTHYROIDISM: ICD-10-CM

## 2018-08-13 DIAGNOSIS — I10 HYPERTENSION GOAL BP (BLOOD PRESSURE) < 150/90: Primary | ICD-10-CM

## 2018-08-13 DIAGNOSIS — E78.5 HYPERLIPIDEMIA LDL GOAL <130: ICD-10-CM

## 2018-08-13 LAB
ALBUMIN SERPL-MCNC: 3.5 G/DL (ref 3.4–5)
ANION GAP SERPL CALCULATED.3IONS-SCNC: 9 MMOL/L (ref 3–14)
BUN SERPL-MCNC: 13 MG/DL (ref 7–30)
CALCIUM SERPL-MCNC: 8.9 MG/DL (ref 8.5–10.1)
CHLORIDE SERPL-SCNC: 100 MMOL/L (ref 94–109)
CO2 SERPL-SCNC: 29 MMOL/L (ref 20–32)
CREAT SERPL-MCNC: 0.73 MG/DL (ref 0.52–1.04)
GFR SERPL CREATININE-BSD FRML MDRD: 75 ML/MIN/1.7M2
GLUCOSE SERPL-MCNC: 126 MG/DL (ref 70–99)
PHOSPHATE SERPL-MCNC: 3.2 MG/DL (ref 2.5–4.5)
POTASSIUM SERPL-SCNC: 3.7 MMOL/L (ref 3.4–5.3)
SODIUM SERPL-SCNC: 138 MMOL/L (ref 133–144)
TSH SERPL DL<=0.005 MIU/L-ACNC: 2.7 MU/L (ref 0.4–4)

## 2018-08-13 PROCEDURE — 84443 ASSAY THYROID STIM HORMONE: CPT | Performed by: FAMILY MEDICINE

## 2018-08-13 PROCEDURE — 36415 COLL VENOUS BLD VENIPUNCTURE: CPT | Performed by: FAMILY MEDICINE

## 2018-08-13 PROCEDURE — 80069 RENAL FUNCTION PANEL: CPT | Performed by: FAMILY MEDICINE

## 2018-08-13 PROCEDURE — 99214 OFFICE O/P EST MOD 30 MIN: CPT | Performed by: FAMILY MEDICINE

## 2018-08-13 RX ORDER — AMLODIPINE BESYLATE 5 MG/1
5 TABLET ORAL DAILY
Qty: 30 TABLET | Refills: 5 | Status: SHIPPED | OUTPATIENT
Start: 2018-08-13 | End: 2019-01-31

## 2018-08-13 RX ORDER — DORZOLAMIDE HCL 20 MG/ML
1 SOLUTION/ DROPS OPHTHALMIC 2 TIMES DAILY
COMMUNITY
End: 2019-01-31

## 2018-08-13 NOTE — LETTER
August 14, 2018    Halle Saucedo  [address] [address] [facility] [address]        Dear Halle,    Generally normal results except blood sugars a little high. Lower simple carbs/sugars in diet. Normal potassium, thyroid and kidney tests. Recheck in 6 months.    If you have any questions or concerns, please call myself or my nurse at [phone].    Sincerely,    .Santino Gordon MD/sofía      Results for orders placed or performed in visit on 08/13/18   Renal panel (Alb, BUN, Ca, Cl, CO2, Creat, Gluc, Phos, K, Na)   Result Value Ref Range    Sodium 138 133 - 144 mmol/L    Potassium 3.7 3.4 - 5.3 mmol/L    Chloride 100 94 - 109 mmol/L    Carbon Dioxide 29 20 - 32 mmol/L    Anion Gap 9 3 - 14 mmol/L    Glucose 126 (H) 70 - 99 mg/dL    Urea Nitrogen 13 7 - 30 mg/dL    Creatinine 0.73 0.52 - 1.04 mg/dL    GFR Estimate 75 >60 mL/min/1.7m2    GFR Estimate If Black >90 >60 mL/min/1.7m2    Calcium 8.9 8.5 - 10.1 mg/dL    Phosphorus 3.2 2.5 - 4.5 mg/dL    Albumin 3.5 3.4 - 5.0 g/dL   TSH with free T4 reflex   Result Value Ref Range    TSH 2.70 0.40 - 4.00 mU/L

## 2018-08-13 NOTE — MR AVS SNAPSHOT
"              After Visit Summary   2018    Halle Saucedo    MRN: [id number]           Patient Information     Date Of Birth          3/8/1926        Visit Information        Provider Department      2018 11:15 AM Santino Gordon MD [facility]        Today's Diagnoses     Hypertension goal BP (blood pressure) < 150/90    -  1    Hyperlipidemia LDL goal <130        Other specified hypothyroidism           Follow-ups after your visit        Who to contact     If you have questions or need follow up information about today's clinic visit or your schedule please contact [facility] directly at [phone].  Normal or non-critical lab and imaging results will be communicated to you by [employer], letter or phone within 4 business days after the clinic has received the results. If you do not hear from us within 7 days, please contact the clinic through [employer] or phone. If you have a critical or abnormal lab result, we will notify you by phone as soon as possible.  Submit refill requests through [employer] or call your pharmacy and they will forward the refill request to us. Please allow 3 business days for your refill to be completed.          Additional Information About Your Visit        MyChart Information     [employer] lets you send messages to your doctor, view your test results, renew your prescriptions, schedule appointments and more. To sign up, go to [email or web address] . Click on \"Log in\" on the left side of the screen, which will take you to the Welcome page. Then click on \"Sign up Now\" on the right side of the page.     You will be asked to enter the access code listed below, as well as some personal information. Please follow the directions to create your username and password.     Your access code is: [email or web address]  Expires: 2018 11:37 AM     Your access code will  in 90 days. If you need help or a new code, please call your [facility] or [phone].      " "  Care EveryWhere ID     This is your Care EveryWhere ID. This could be used by other organizations to access your [facility] medical records  [id number]        Your Vitals Were     Pulse Temperature Respirations Height Pulse Oximetry BMI (Body Mass Index)    66 97.6  F (36.4  C) (Oral) 24 4' 11.75\" (1.518 m) 98% 25.8 kg/m2       Blood Pressure from Last 3 Encounters:   08/13/18 146/88   01/12/18 130/82   12/17/17 158/72    Weight from Last 3 Encounters:   08/13/18 131 lb (59.4 kg)   01/12/18 131 lb (59.4 kg)   12/17/17 131 lb 2 oz (59.5 kg)              We Performed the Following     Renal panel (Alb, BUN, Ca, Cl, CO2, Creat, Gluc, Phos, K, Na)     TSH with free T4 reflex          Today's Medication Changes          These changes are accurate as of 8/13/18 11:37 AM.  If you have any questions, ask your nurse or doctor.               Start taking these medicines.        Dose/Directions    amLODIPine 5 MG tablet   Commonly known as:  NORVASC   Used for:  Hypertension goal BP (blood pressure) < 150/90   Started by:  Santino Gordon MD        Dose:  5 mg   Take 1 tablet (5 mg) by mouth daily New for blood pressure - take in AM. Can start with 1/2 dosage   Quantity:  30 tablet   Refills:  5            Where to get your medicines      These medications were sent to [facility] [address] [place] [address] [address] [address] [address]  [address] [facility] [address]     Phone:  [phone]     amLODIPine 5 MG tablet                Primary Care Provider Office Phone # Fax #    Santino Gordon [phone] [address] [facility] [address]        Equal Access to Services     YANIRA MCCALL AH: Thuy fraga Sojackeline, waaxda luqadaha, qaybta kaalmada adeegyanir, jai tang. So [facility] [phone].    ATENCIÓN: Si habla español, tiene a reza disposición servicios gratuitos de asistencia lingüística. Llame al " [phone].    We comply with applicable federal civil rights laws and [place] laws. We do not discriminate on the basis of race, color, national origin, age, disability, sex, sexual orientation, or gender identity.            Thank you!     Thank you for choosing [facility]  for your care. Our goal is always to provide you with excellent care. Hearing back from our patients is one way we can continue to improve our services. Please take a few minutes to complete the written survey that you may receive in the mail after your visit with us. Thank you!             Your Updated Medication List - Protect others around you: Learn how to safely use, store and throw away your medicines at www.disposemymeds.org.          This list is accurate as of 8/13/18 11:37 AM.  Always use your most recent med list.                   Brand Name Dispense Instructions for use Diagnosis    amLODIPine 5 MG tablet    NORVASC    30 tablet    Take 1 tablet (5 mg) by mouth daily New for blood pressure - take in AM. Can start with 1/2 dosage    Hypertension goal BP (blood pressure) < 150/90       atorvastatin 40 MG tablet    LIPITOR    90 tablet    Take 1 tablet (40 mg) by mouth daily This is double dosage for cholesterol. Back to 20mg (1/2 pill) if bad body aches.    Hyperlipidemia LDL goal <130       clopidogrel 75 MG tablet    PLAVIX     Take by mouth daily        dorzolamide 2 % ophthalmic solution    TRUSOPT     Place 1 drop into the right eye 2 times daily        ELIQUIS 2.5 MG tablet   Generic drug:  apixaban ANTICOAGULANT      Take by mouth 2 times daily        furosemide 40 MG tablet    LASIX    90 tablet    TAKE 1 TABLET BY MOUTH EVERY MORNING    Hypertension goal BP (blood pressure) < 150/90       losartan 50 MG tablet    COZAAR    180 tablet    Take 1 tablet (50 mg) by mouth 2 times daily    Benign essential hypertension       LUMIGAN 0.01 % Soln   Generic drug:  bimatoprost           metoprolol succinate 25 MG 24  hr tablet    TOPROL-XL     Take 25 mg by mouth daily        MULTI-VITAMIN PO      1 tab daily        nitroGLYcerin 0.4 MG/HR 24 hr patch    NITRODUR     Place 1 patch onto the skin daily        omeprazole 20 MG CR capsule    priLOSEC    30 capsule    Take 1 capsule (20 mg) by mouth daily x10 days then as needed for heartburn/upset stomach    Gastroesophageal reflux disease with esophagitis       potassium chloride 10 MEQ tablet    K-TAB,KLOR-CON    180 tablet    Take 1 tablet (10 mEq) by mouth 2 times daily Pharmacy ok to hold prescription until due    Hypertension goal BP (blood pressure) < 150/90       VITAMIN D PO      2 tabs daily        VITAMIN E      400mg 2 tabs daily

## 2018-08-13 NOTE — PROGRESS NOTES
"SUBJECTIVE:  Halle Saucedo, a 89 year old female scheduled an appointment to discuss the following issues:  Follow-up htn - outside blood pressure running higher. Seeing cardiology s/p angioplasty after MI.   New blood pressure cuff running higher then old cuff. Watching sodium/salt in diet. No chest pain or shortness of breath.   Blood pressure cuff today about same as our blood pressure cuff. No urine changes. Some leg swelling - chronic issues. One lasix in AM.   Past Medical History:   Diagnosis Date     Glaucoma     left and right eyes     Other and unspecified hyperlipidemia      Unspecified essential hypertension        Past Surgical History:   Procedure Laterality Date     CATARACT IOL, RT/LT       D & C       TONSILLECTOMY & ADENOIDECTOMY      childhood       Family History   Problem Relation Age of Onset     Hypertension Brother      Hypertension Sister      Hypertension Sister        Social History   Substance Use Topics     Smoking status: Never Smoker     Smokeless tobacco: Never Used     Alcohol use Yes      Comment: rare       ROS:  All other ROS negative    OBJECTIVE:  /88  Pulse 66  Temp 97.6  F (36.4  C) (Oral)  Resp 24  Ht 4' 11.75\" (1.518 m)  Wt 131 lb (59.4 kg)  SpO2 98%  BMI 25.8 kg/m2  EXAM:  GENERAL APPEARANCE: healthy, alert and no distress  EYES: EOMI,  PERRL  HENT: ear canals and TM's normal and nose and mouth without ulcers or lesions  NECK: no adenopathy, no asymmetry, masses, or scars and thyroid normal to palpation  RESP: lungs clear to auscultation - no rales, rhonchi or wheezes  CV: regular rates and rhythm, normal S1 S2, no S3 or S4 and no murmur, click or rub -  ABDOMEN:  soft, nontender, no HSM or masses and bowel sounds normal  MS: extremities normal- no gross deformities noted, no evidence of inflammation in joints, FROM in all extremities.  PSYCH: mentation appears normal and affect normal/bright  PSYCH:mildly anxious    ASSESSMENT / PLAN:  (I10) Hypertension goal BP " (blood pressure) < 150/90  (primary encounter diagnosis)  Comment: running a little high. Might be a little anxious too.   Plan: Renal panel (Alb, BUN, Ca, Cl, CO2, Creat,         Gluc, Phos, K, Na), amLODIPine (NORVASC) 5 MG         tablet        Will add norvasc - can start with 1/2 pill in AM. Continue self-monitor and limit sodium. Exercise more. Chest pain or shortness of breath to er. Seeing cardiology next month. Reveiwed risks and side effects of medication      (E78.5) Hyperlipidemia LDL goal <130  Comment: stable  Plan: continue lipitor    (E03.8) Other specified hypothyroidism  Comment: no meds and stable 2 years ago.   Plan: TSH with free T4 reflex        Recheck tsh. Med if a lot worse.     Santino Gordon

## 2018-08-13 NOTE — NURSING NOTE
"Chief Complaint   Patient presents with     Hypertension     Health Maintenance     fall/ph2       Initial /80  Pulse 66  Temp 97.6  F (36.4  C) (Oral)  Resp 24  Ht 4' 11.75\" (1.518 m)  Wt 131 lb (59.4 kg)  SpO2 98%  BMI 25.8 kg/m2 Estimated body mass index is 25.8 kg/(m^2) as calculated from the following:    Height as of this encounter: 4' 11.75\" (1.518 m).    Weight as of this encounter: 131 lb (59.4 kg).    Keysha Kumar CMA    "

## 2018-08-22 ENCOUNTER — TELEPHONE (OUTPATIENT)
Dept: FAMILY MEDICINE | Facility: CLINIC | Age: 83
End: 2018-08-22

## 2018-08-22 NOTE — TELEPHONE ENCOUNTER
Could she try to take it before bed? Sometimes changing the schedule is helpful with side effects.   If it occurs again, she will have to hold and wait for alternative medications options by Dr. Gordon when he returns.   At least, she will have tried to adjust her schedule with the medication in the meantime.   Thank you.  Kristen Kehr PA-C

## 2018-08-22 NOTE — TELEPHONE ENCOUNTER
Pt said she only tried the 1/2 pill amlodipine daily and it made her dizzy and her brain feel fuzzy.  She states she has had these side effects from other blood pressure medications in the past.  Pt has stopped the amlodipine.  To provider to advise.  Keysha YEN, RN

## 2018-08-22 NOTE — TELEPHONE ENCOUNTER
Patient states she was started on a new pill amlodipine 5 mg, states took this for 10 days and stopped yesterday. Due to it made her fill dizzy and unsteady. Has had this kind of reaction to medication in the past. Would like a nurse to call to discuss.

## 2018-08-22 NOTE — TELEPHONE ENCOUNTER
Pt notified of provider message as written.  Pt verbalized good understanding.  Advised pt I would call her on Friday to see how that worked. Pt agrees to plan.  Keysha YEN, RN

## 2018-08-27 NOTE — TELEPHONE ENCOUNTER
Overall blood pressure not bad but would like to see more in the 120-130's. Monitor sodium intake and take deep breaths. Consider seeing therapist for meditation techniques. Can double amlodipine to 1/2 TWice daily if still blood pressure too high. Santino Gordon MD

## 2018-08-27 NOTE — TELEPHONE ENCOUNTER
Call to patient - patient has tried taking Amlodipine 1/2 tab before bed, working well, dizziness has improved.  Was up during the night last night, worried about finances, blood pressure elevated this morning- 156/83  yest - 137/75 at 9am,  130/69 at 4pm  8/25 - 154/85 at 8:30am,  135/81 - at 11:30am    To provider to advise    Stefania YEN, RN, CPN

## 2018-08-28 ENCOUNTER — TELEPHONE (OUTPATIENT)
Dept: FAMILY MEDICINE | Facility: CLINIC | Age: 83
End: 2018-08-28

## 2018-08-28 NOTE — TELEPHONE ENCOUNTER
Patient states he blood pressure was 152 this am and took 1/2 tab of Amlodipine, did come down to 132/72.  Advised patient to continue 1/2 tab twice a day and call back Friday with blood pressure readings.    Stefania YEN, RN, CPN

## 2018-09-04 ENCOUNTER — TELEPHONE (OUTPATIENT)
Dept: FAMILY MEDICINE | Facility: CLINIC | Age: 83
End: 2018-09-04

## 2018-09-04 DIAGNOSIS — I10 HYPERTENSION GOAL BP (BLOOD PRESSURE) < 150/90: Primary | ICD-10-CM

## 2018-09-04 NOTE — TELEPHONE ENCOUNTER
Patient states continues to take the amlodipine 2.5mg twice a day.  Is calling per MD instructions 8/28 with her current bp's.  Has not been checking her pulse.  Aware Dr. Santino Gordon not in clinic til Thursday.  She is ok waiting if no response til then.  Jyotsna Poe RN

## 2018-09-04 NOTE — TELEPHONE ENCOUNTER
Spoke with patient.  Made aware of Dr. Santino Gorodn's instructions as per belowl  She states understanding.  Appointment made with RN for bp check on 9/17.  Jyotsna Poe RN

## 2018-09-04 NOTE — TELEPHONE ENCOUNTER
Continue self-monitor, current medications and be consistent with sodium/salt intake and meal times. Try not to worry about blood pressure too because stess with make it worse. Take deep breaths if blood pressure high and repeat in 5-10 minutes. Patient should follow-up with our HTN RN in next 1-2 weeks. Santino Gordon MD

## 2018-09-04 NOTE — TELEPHONE ENCOUNTER
Patient calling in recent b/p readings.  9/2- 158/81, 153/84       9/3- 163/85   133/80,       9/4- 119/65, 108/65      Patient recently start a new b/p med   Ok to leave a message.

## 2018-09-17 ENCOUNTER — TELEPHONE (OUTPATIENT)
Dept: FAMILY MEDICINE | Facility: CLINIC | Age: 83
End: 2018-09-17

## 2018-09-17 ENCOUNTER — ALLIED HEALTH/NURSE VISIT (OUTPATIENT)
Dept: NURSING | Facility: CLINIC | Age: 83
End: 2018-09-17
Payer: COMMERCIAL

## 2018-09-17 VITALS
SYSTOLIC BLOOD PRESSURE: 132 MMHG | BODY MASS INDEX: 24.05 KG/M2 | HEART RATE: 53 BPM | DIASTOLIC BLOOD PRESSURE: 61 MMHG | HEIGHT: 61 IN | WEIGHT: 127.4 LBS | TEMPERATURE: 96.6 F

## 2018-09-17 DIAGNOSIS — R07.9 CHEST PAIN, UNSPECIFIED TYPE: Primary | ICD-10-CM

## 2018-09-17 PROCEDURE — 99211 OFF/OP EST MAY X REQ PHY/QHP: CPT

## 2018-09-17 RX ORDER — NITROGLYCERIN 0.4 MG/1
TABLET SUBLINGUAL
COMMUNITY
Start: 2018-09-17 | End: 2024-08-30

## 2018-09-17 ASSESSMENT — PAIN SCALES - GENERAL: PAINLEVEL: NO PAIN (0)

## 2018-09-17 NOTE — MR AVS SNAPSHOT
"              After Visit Summary   9/17/2018    Halle Saucedo    MRN: [id number]           Patient Information     Date Of Birth          3/8/1926        Visit Information        Provider Department      9/17/2018 10:00 AM Rn, [facility]        Today's Diagnoses     Chest pain, unspecified type    -  1      Care Instructions    Patient will continue her medications as currently taking.  Will await call from her clinic regarding updated instructions for her medications; ie amlodipine and losartan dosing.  She is available today at [phone].  Uses [facility] in [place].          Follow-ups after your visit        Who to contact     If you have questions or need follow up information about today's clinic visit or your schedule please contact [facility] directly at [phone].  Normal or non-critical lab and imaging results will be communicated to you by MyChart, letter or phone within 4 business days after the clinic has received the results. If you do not hear from us within 7 days, please contact the clinic through MyChart or phone. If you have a critical or abnormal lab result, we will notify you by phone as soon as possible.  Submit refill requests through [employer] or call your pharmacy and they will forward the refill request to us. Please allow 3 business days for your refill to be completed.          Additional Information About Your Visit        Care EveryWhere ID     This is your Care EveryWhere ID. This could be used by other organizations to access your [place] medical records  [id number]        Your Vitals Were     Pulse Temperature Height BMI (Body Mass Index)          53 96.6  F (35.9  C) (Oral) 5' 1\" (1.549 m) 24.07 kg/m2         Blood Pressure from Last 3 Encounters:   09/17/18 132/61   08/13/18 146/88   01/12/18 130/82    Weight from Last 3 Encounters:   09/17/18 127 lb 6.4 oz (57.8 kg)   08/13/18 131 lb (59.4 kg)   01/12/18 131 lb (59.4 kg)              Today, you " had the following     No orders found for display       Primary Care Provider Office Phone # Fax #    Santino Gordon [phone] [address] [facility] [address]        Equal Access to Services     YANIRA MCCALL : Hadii ginny ku melanieo Sojoséali, waaxda luqadaha, qaybta kaalmada adeegyada, jai crowdern michelle villa laParvinhedy tang. So [facility] [phone].    ATENCIÓN: Si habla español, tiene a reza disposición servicios gratuitos de asistencia lingüística. Llame al [phone].    We comply with applicable federal civil rights laws and [place] laws. We do not discriminate on the basis of race, color, national origin, age, disability, sex, sexual orientation, or gender identity.            Thank you!     Thank you for choosing [facility]  for your care. Our goal is always to provide you with excellent care. Hearing back from our patients is one way we can continue to improve our services. Please take a few minutes to complete the written survey that you may receive in the mail after your visit with us. Thank you!             Your Updated Medication List - Protect others around you: Learn how to safely use, store and throw away your medicines at www.disposemymeds.org.          This list is accurate as of 9/17/18 11:59 PM.  Always use your most recent med list.                   Brand Name Dispense Instructions for use Diagnosis    amLODIPine 5 MG tablet    NORVASC    30 tablet    Take 1 tablet (5 mg) by mouth daily New for blood pressure - take in AM. Can start with 1/2 dosage    Hypertension goal BP (blood pressure) < 150/90       atorvastatin 40 MG tablet    LIPITOR    90 tablet    Take 1 tablet (40 mg) by mouth daily This is double dosage for cholesterol. Back to 20mg (1/2 pill) if bad body aches.    Hyperlipidemia LDL goal <130       clopidogrel 75 MG tablet    PLAVIX     Take by mouth daily        dorzolamide 2 % ophthalmic solution    TRUSOPT     Place 1 drop into the  right eye 2 times daily        ELIQUIS 2.5 MG tablet   Generic drug:  apixaban ANTICOAGULANT      Take by mouth 2 times daily        furosemide 40 MG tablet    LASIX    90 tablet    TAKE 1 TABLET BY MOUTH EVERY MORNING    Hypertension goal BP (blood pressure) < 150/90       losartan 50 MG tablet    COZAAR    180 tablet    Take 1 tablet (50 mg) by mouth 2 times daily    Benign essential hypertension       LUMIGAN 0.01 % Soln   Generic drug:  bimatoprost           metoprolol succinate 25 MG 24 hr tablet    TOPROL-XL     Take 25 mg by mouth daily        MULTI-VITAMIN PO      1 tab daily        nitroGLYcerin 0.4 MG sublingual tablet    NITROSTAT     For chest pain place 1 tablet under the tongue every 5 minutes for 3 doses. If symptoms persist 5 minutes after 1st dose call 911.    Chest pain, unspecified type       omeprazole 20 MG CR capsule    priLOSEC    30 capsule    Take 1 capsule (20 mg) by mouth daily x10 days then as needed for heartburn/upset stomach    Gastroesophageal reflux disease with esophagitis       potassium chloride 10 MEQ tablet    K-TAB,KLOR-CON    180 tablet    Take 1 tablet (10 mEq) by mouth 2 times daily Pharmacy ok to hold prescription until due    Hypertension goal BP (blood pressure) < 150/90       VITAMIN D PO      2 tabs daily        VITAMIN E      400mg 2 tabs daily

## 2018-09-17 NOTE — Clinical Note
Patient /61 with Heart Rate: 53 today. States taking amlodipine 5mg, 1/2 tab in the morning.  She reports that her vision feels unfocused and she feels whoozy/dizzy (not spinning) for approx 4-5 hrs after taking the med.  I ? If this is due to the low heart rate?  Can she try taking the amlodipine at night?  She states rarely has to get up at night to go to bathroom.   She also reports that you had changed her losarftan 50mg to one tab daily a couple months ago.  And the nitroglycerin is an SL tab; not a patch, and is prn.  Will need her medication list updated and please advise on her bp/pulse and side effects.   Thank you.  COLEMAN Goyal

## 2018-09-17 NOTE — TELEPHONE ENCOUNTER
Patient /61 with Heart Rate: 53 today.   at RN visit  States taking amlodipine 5mg, 1/2 tab in the morning.  She reports that her vision feels unfocused and she feels whoozy/dizzy (not spinning) for approx 4-5 hrs after taking the med.   I ? If this is due to the low heart rate?  Can she try taking the amlodipine at night?  She states rarely has to get up at night to go to bathroom.   She also reports that you had changed her losarftan 50mg to one tab daily a couple months ago.  And the nitroglycerin is an SL tab; not a patch, and is prn.  Will need her medication list updated to reflect actual/correct amlodipine and losartan dosing and please advise on her bp/pulse and side effects.     She is also needing refill on the amlodipine.  Pharmacy pended.    Please have RN call her with further instructions.  Thank you.  COLEMAN Goyal

## 2018-09-17 NOTE — PATIENT INSTRUCTIONS
Patient will continue her medications as currently taking.  Will await call from her clinic regarding updated instructions for her medications; ie amlodipine and losartan dosing.  She is available today at [phone].  Uses [facility] in [place].

## 2018-09-17 NOTE — PROGRESS NOTES
Halle Saucedo is being followed for Blood Pressure management.    NURSING ASSESSMENT/PLAN:  Blood pressure reading today is at the provider specified goal of 150/90.    Current blood pressure medication(s):  Amlodipine 5mg, taking 1/2 tab in a.m  Losartan 50mg; she states is taking 1 tab daily (med list states bid but she states Dr. Santino Gordon had changed that few months ago), metoprolol 25mg, taking one tab daily  1.  The patient will continue current medication regimen unchanged.   RN will forward chart and telephone message to provider regarding her side effects with med and the losartan dosing needing clarification.   2.  We will not be checking a metabolic lab panel today.      3.  Follow up instructions include:     Per Dr. Santino Gordon .    SUBJECTIVE:                                                    The patient is taking medication as prescribed and is not tolerating well.  She is taking the amlodipine 1/2 tab in the early a.m.  She states that she experiences fuzziness in vision and whooziness/dizziness (not spinning) until about noon (approx 4-5 hrs).  She reports very little swelling right ankle since she elevates it when sitting.  No swelling noted at this appointment.  Denies any shortness of breath.  Patient is monitoring Blood Pressure at home.   Last 3 home readings states are pretty consistently 140's/ 80's.  States doesn't check her heart rate (machine doesn't do this and she reports is irregular due to her a fib)  Sees her cardiologist again in October.    In addition notes:     Out of the following complicating factors: Cough, Headache, Lightheadedness, Shortness of breath, Fatigue, Nausea, Sexual Dysfunction, New onset of swelling or edema, Weakness and New onset of Chest Pain, the patient reports:  None and Lightheadedness    OBJECTIVE:                                                    Is pulse 55 or greater? - No  Pulse was 53.  Pulse Readings from Last 1 Encounters:   08/13/18 66     Today's  BP completed using cuff size: small regular on right side arm.  BP Readings from Last 3 Encounters:   08/13/18 146/88   01/12/18 130/82   12/17/17 158/72       Current Outpatient Prescriptions   Medication Sig Dispense Refill     amLODIPine (NORVASC) 5 MG tablet Take 1 tablet (5 mg) by mouth daily New for blood pressure - take in AM. Can start with 1/2 dosage 30 tablet 5     apixaban ANTICOAGULANT (ELIQUIS) 2.5 MG tablet Take by mouth 2 times daily       atorvastatin (LIPITOR) 40 MG tablet Take 1 tablet (40 mg) by mouth daily This is double dosage for cholesterol. Back to 20mg (1/2 pill) if bad body aches. 90 tablet 3     clopidogrel (PLAVIX) 75 MG tablet Take by mouth daily       dorzolamide (TRUSOPT) 2 % ophthalmic solution Place 1 drop into the right eye 2 times daily       furosemide (LASIX) 40 MG tablet TAKE 1 TABLET BY MOUTH EVERY MORNING 90 tablet 0     losartan (COZAAR) 50 MG tablet Take 1 tablet (50 mg) by mouth 2 times daily 180 tablet 1     LUMIGAN 0.01 % SOLN        metoprolol (TOPROL-XL) 25 MG 24 hr tablet Take 25 mg by mouth daily       MULTI-VITAMIN OR 1 tab daily       nitroGLYcerin (NITRODUR) 0.4 MG/HR 24 hr patch Place 1 patch onto the skin daily       omeprazole (PRILOSEC) 20 MG CR capsule Take 1 capsule (20 mg) by mouth daily x10 days then as needed for heartburn/upset stomach 30 capsule 1     potassium chloride (K-TAB,KLOR-CON) 10 MEQ tablet Take 1 tablet (10 mEq) by mouth 2 times daily Pharmacy ok to hold prescription until due 180 tablet 1     VITAMIN D OR 2 tabs daily       VITAMIN E 400mg 2 tabs daily       Potassium   Date Value Ref Range Status   08/13/2018 3.7 3.4 - 5.3 mmol/L Final     Creatinine   Date Value Ref Range Status   08/13/2018 0.73 0.52 - 1.04 mg/dL Final     Urea Nitrogen   Date Value Ref Range Status   08/13/2018 13 7 - 30 mg/dL Final     GFR Estimate   Date Value Ref Range Status   08/13/2018 75 >60 mL/min/1.7m2 Final     Comment:     Non  GFR Calc      A  baseline potassium, creatinine, BUN, GFR has been done within past 12 months    Education:  written materials handout and general discussion/verbal explanation  Limit dietary sodium intake between 1500-2000mg every day  Regular aerobic exercise.  Discussed habit change regarding patient states that she has always watched her sodium intake.  doesn't eat processed foods; doesn't salt foods with cooking  These interventions were used: Empathy/Understanding, Permission to raise concern or advise and Open ended questions  Education material provided and patient was given an opportunity to ask questions.    Patient verbalized understanding of this plan and is agreeable.    Professional Reference click here   Copy of current RN HTN MGMT order or refer to Other Orders:  Patient is being referred to RN Hypertension Program for the following diagnoses:  Hypertension    RN will confirm Potassium, Creatinine, BUN (or BMP) have been done within the past 12 months.  RN will order follow-up labs according to RN protocol.      According to RN Protocol, start or titrate:     Double norvasc if needed.  And/or metoprolol if pulse not too low  If blood pressure not at goal after current medication titrated, the following medication(s) may be prescribed and titrated.        If blood pressure not at goal after maximum dose reached, consult provider.    If blood pressure at goal, follow up: with PCP 6 mos. from last PCP appt.   Forwarding chart and telephone encounter to her provider for advisement on her medications due to discrepancies in how she is taking with the medication list as well as the symptoms she is experiencing.    She will need refill of the amlodipine if MD is going to continue her on it.    Bernadette Poe RN

## 2018-09-18 NOTE — TELEPHONE ENCOUNTER
Ok for refill and ok for taking norvasc at bedtime. Keep well hydrated and avoid caffeine. Get up slowly. Santino Gordon MD

## 2018-10-08 ENCOUNTER — TELEPHONE (OUTPATIENT)
Dept: FAMILY MEDICINE | Facility: CLINIC | Age: 83
End: 2018-10-08

## 2018-10-08 DIAGNOSIS — I10 HYPERTENSION GOAL BP (BLOOD PRESSURE) < 150/90: ICD-10-CM

## 2018-10-08 NOTE — TELEPHONE ENCOUNTER
Patient calling with her blood pressure readings, she states they are good. Please call to discuss.

## 2018-10-10 RX ORDER — POTASSIUM CHLORIDE 750 MG/1
TABLET, EXTENDED RELEASE ORAL
Qty: 180 TABLET | Refills: 3 | Status: SHIPPED | OUTPATIENT
Start: 2018-10-10 | End: 2019-09-11

## 2018-10-15 ENCOUNTER — TRANSFERRED RECORDS (OUTPATIENT)
Dept: HEALTH INFORMATION MANAGEMENT | Facility: CLINIC | Age: 83
End: 2018-10-15

## 2018-10-17 ENCOUNTER — TELEPHONE (OUTPATIENT)
Dept: FAMILY MEDICINE | Facility: CLINIC | Age: 83
End: 2018-10-17

## 2018-10-17 NOTE — TELEPHONE ENCOUNTER
Halle states she saw RN HYPERTENSION nurse and was advised to follow up with her again.  I reviewed notes from visit on 9/17/18 and pt's bp was in range.  She has also seen the cardiologist and the bp was in range.   Halle is asking if she needs to see RN hypertension nurse again or can she just see you when her next appointment is due?  Keysha YEN, RN

## 2018-10-17 NOTE — TELEPHONE ENCOUNTER
Patient is calling asking to speak with nurse regarding her Bp.   Please call to advise  Thank you

## 2018-10-17 NOTE — TELEPHONE ENCOUNTER
Pt notified of provider message as written.  Pt verbalized good understanding.  Keysha Juárez BSN, RN

## 2018-10-26 ENCOUNTER — ALLIED HEALTH/NURSE VISIT (OUTPATIENT)
Dept: PHARMACY | Facility: CLINIC | Age: 83
End: 2018-10-26
Payer: COMMERCIAL

## 2018-10-26 DIAGNOSIS — I48.91 ATRIAL FIBRILLATION, UNSPECIFIED TYPE (H): Primary | ICD-10-CM

## 2018-10-26 DIAGNOSIS — I25.2 HISTORY OF ST ELEVATION MYOCARDIAL INFARCTION (STEMI): ICD-10-CM

## 2018-10-26 DIAGNOSIS — E78.5 HYPERLIPIDEMIA LDL GOAL <130: ICD-10-CM

## 2018-10-26 DIAGNOSIS — E63.9 NUTRITIONAL DEFICIENCY: ICD-10-CM

## 2018-10-26 DIAGNOSIS — H40.50X0 GLAUCOMA ASSOCIATED WITH ANOMALIES OF IRIS: ICD-10-CM

## 2018-10-26 DIAGNOSIS — I10 HYPERTENSION GOAL BP (BLOOD PRESSURE) < 150/90: ICD-10-CM

## 2018-10-26 DIAGNOSIS — Q13.2 GLAUCOMA ASSOCIATED WITH ANOMALIES OF IRIS: ICD-10-CM

## 2018-10-26 DIAGNOSIS — I73.9 PVD (PERIPHERAL VASCULAR DISEASE) (H): ICD-10-CM

## 2018-10-26 DIAGNOSIS — K21.9 GASTROESOPHAGEAL REFLUX DISEASE, ESOPHAGITIS PRESENCE NOT SPECIFIED: ICD-10-CM

## 2018-10-26 PROCEDURE — 99605 MTMS BY PHARM NP 15 MIN: CPT | Mod: U4 | Performed by: PHARMACIST

## 2018-10-26 PROCEDURE — 99607 MTMS BY PHARM ADDL 15 MIN: CPT | Mod: U4 | Performed by: PHARMACIST

## 2018-10-26 RX ORDER — VITAMIN E 268 MG
800 CAPSULE ORAL DAILY
COMMUNITY

## 2018-10-26 RX ORDER — PHENOL 1.4 %
1 AEROSOL, SPRAY (ML) MUCOUS MEMBRANE DAILY
COMMUNITY

## 2018-10-26 NOTE — MR AVS SNAPSHOT
After Visit Summary   10/26/2018    Halle Saucedo    MRN: [id number]           Patient Information     Date Of Birth          3/8/1926        Visit Information        Provider Department      10/26/2018 8:00 AM [facility]        Today's Diagnoses     Atrial fibrillation, unspecified type (H)    -  1    History of ST elevation myocardial infarction (STEMI)        PVD (peripheral vascular disease) (H)        Hypertension goal BP (blood pressure) < 150/90        Hyperlipidemia LDL goal <130        Gastroesophageal reflux disease, esophagitis presence not specified        Glaucoma associated with anomalies of iris        Nutritional deficiency          Care Instructions    Continue current therapy.           Follow-ups after your visit        Who to contact     If you have questions or need follow up information about today's clinic visit or your schedule please contact [facility] directly at [phone].  Normal or non-critical lab and imaging results will be communicated to you by MyChart, letter or phone within 4 business days after the clinic has received the results. If you do not hear from us within 7 days, please contact the clinic through MyChart or phone. If you have a critical or abnormal lab result, we will notify you by phone as soon as possible.  Submit refill requests through [employer] or call your pharmacy and they will forward the refill request to us. Please allow 3 business days for your refill to be completed.          Additional Information About Your Visit        Care EveryWhere ID     This is your Care EveryWhere ID. This could be used by other organizations to access your [place] medical records  [id number]         Blood Pressure from Last 3 Encounters:   09/17/18 132/61   08/13/18 146/88   01/12/18 130/82    Weight from Last 3 Encounters:   09/17/18 127 lb 6.4 oz (57.8 kg)   08/13/18 131 lb (59.4 kg)   01/12/18 131 lb  (59.4 kg)              Today, you had the following     No orders found for display       Primary Care Provider Office Phone # Fax #    Santino Gordon [phone] [address] [address] [facility] [address]        Equal Access to Services     YOVANYBJ STEFANY : Hadii aad ku hadasho Soomaali, waaxda luqadaha, qaybta kaalmada adeegyada, waxay idiin hayaan adeeg khlogan lahughthierno tang. So [facility] [phone].    ATENCIÓN: Si habla español, tiene a reza disposición servicios gratuitos de asistencia lingüística. Llame al [phone].    We comply with applicable federal civil rights laws and [place] laws. We do not discriminate on the basis of race, color, national origin, age, disability, sex, sexual orientation, or gender identity.            Thank you!     Thank you for choosing [facility] [place]  for your care. Our goal is always to provide you with excellent care. Hearing back from our patients is one way we can continue to improve our services. Please take a few minutes to complete the written survey that you may receive in the mail after your visit with us. Thank you!             Your Updated Medication List - Protect others around you: Learn how to safely use, store and throw away your medicines at www.disposemymeds.org.          This list is accurate as of 10/26/18  3:58 PM.  Always use your most recent med list.                   Brand Name Dispense Instructions for use Diagnosis    amLODIPine 5 MG tablet    NORVASC    30 tablet    Take 1 tablet (5 mg) by mouth daily New for blood pressure - take in AM. Can start with 1/2 dosage    Hypertension goal BP (blood pressure) < 150/90       aspirin 81 MG tablet      Take 81 mg by mouth daily        atorvastatin 40 MG tablet    LIPITOR    90 tablet    Take 1 tablet (40 mg) by mouth daily This is double dosage for cholesterol. Back to 20mg (1/2 pill) if bad body aches.    Hyperlipidemia LDL goal <130       dorzolamide 2 % ophthalmic solution     TRUSOPT     Place 1 drop into the right eye 2 times daily        ELIQUIS 2.5 MG tablet   Generic drug:  apixaban ANTICOAGULANT      Take by mouth 2 times daily        furosemide 40 MG tablet    LASIX    90 tablet    TAKE 1 TABLET BY MOUTH EVERY MORNING    Hypertension goal BP (blood pressure) < 150/90       losartan 50 MG tablet    COZAAR    180 tablet    Take 1 tablet (50 mg) by mouth 2 times daily    Benign essential hypertension       LUMIGAN 0.01 % Soln   Generic drug:  bimatoprost      Place 1 drop into both eyes At Bedtime        metoprolol succinate 25 MG 24 hr tablet    TOPROL-XL     Take 25 mg by mouth daily        MULTIVITAMIN ADULTS 50+ Tabs      Take 1 tablet by mouth daily        nitroGLYcerin 0.4 MG sublingual tablet    NITROSTAT     For chest pain place 1 tablet under the tongue every 5 minutes for 3 doses. If symptoms persist 5 minutes after 1st dose call 911.    Chest pain, unspecified type       omeprazole 20 MG CR capsule    priLOSEC    30 capsule    Take 1 capsule (20 mg) by mouth daily x10 days then as needed for heartburn/upset stomach    Gastroesophageal reflux disease with esophagitis       potassium chloride 10 MEQ tablet    K-TAB,KLOR-CON    180 tablet    TAKE 1 TABLET BY MOUTH TWICE DAILY    Hypertension goal BP (blood pressure) < 150/90       VITAMIN D (CHOLECALCIFEROL) PO      Take 2 tablets by mouth daily        vitamin E 400 UNIT capsule      Take 800 Units by mouth daily

## 2018-10-26 NOTE — LETTER
"     [facility] MTM     Date: 10/26/2018    Halle Saucedo  [address] [facility] [address]    Dear Ms. Saucedo,    Thank you for talking with me on 10/26/18 about your health and medications. Medicare s MTM (Medication Therapy Management) program helps you understand your medications and use them safely.      This letter includes an action plan (Medication Action Plan) and medication list (Personal Medication List). The action plan has steps you should take to help you get the best results from your medications. The medication list will help you keep track of your medications and how to use them the right way.       Have your action plan and medication list with you when you talk with your doctors, pharmacists, and other healthcare providers in your care team.     Ask your doctors, pharmacists, and other healthcare providers to update the action plan and medication list at every visit.     Take your medication list with you if you go to the hospital or emergency room.     Give a copy of the action plan and medication list to your family or caregivers.     If you want to talk about this letter or any of the papers with it, please call   [phone].   We look forward to working with you, your doctors, and other healthcare providers to help you stay healthy.     Sincerely,    Vern Watson      Enclosed: Medication Action Plan and Personal Medication List    MEDICATION ACTION PLAN FOR Halle Saucedo,  3/8/1926     This action plan will help you get the best results from your medications if you:   1. Read \"What we talked about.\"   2. Take the steps listed in the \"What I need to do\" boxes.   3. Fill in \"What I did and when I did it.\"   4. Fill in \"My follow-up plan\" and \"Questions I want to ask.\"     Have this action plan with you when you talk with your doctors, pharmacists, and other healthcare providers in your care team. Share this with your family or caregivers too.  DATE PREPARED: " 10/26/2018  What we talked about: What my medicines are for, how to know if my medicines are working, made sure my medicines are safe for me and reviewed how to take my medicines.                                                  What I need to do: Take my medicines every day. What I did and when I did it:                                              My follow-up plan:                 Questions I want to ask:              If you have any questions about your action plan, call Vern Watson, Phone: [phone] , Monday-Friday 8-4:30pm.           MEDICATION LIST FOR Halle Saucedo  3/8/1926     This medication list was made for you after we talked. We also used information from your doctor's chart.      Use blank rows to add new medications. Then fill in the dates you started using them.    Cross out medications when you no longer use them. Then write the date and why you stopped using them.    Ask your doctors, pharmacists, and other healthcare providers to update this list at every visit. Keep this list up-to-date with:       Prescription medications    Over the counter drugs     Herbals    Vitamins    Minerals      If you go to the hospital or emergency room, take this list with you. Share this with your family or caregivers too.     DATE PREPARED: 10/26/2018  Allergies or side effects: Alphagan [brimonidine tartrate]; Azopt [brinzolamide]; Beta adrenergic blockers; Bisphosphonates; Estrogens; Gemfibrozil; Xalatan [latanoprost]; Zetia [ezetimibe]; and Zocor [hmg-coa-r inhibitors]     Medication:  AMLODIPINE BESYLATE 5 MG PO TABS      How I use it:  Take one-half tablet (2.5 mg) by mouth daily      Why I use it: Blood pressure    Prescriber:  Santino Gordon MD      Date I started using it:       Date I stopped using it:         Why I stopped using it:            Medication:  APIXABAN 2.5 MG PO TABS      How I use it:  Take one tablet by mouth 2 times daily      Why I use it:  Atrial fibrillation     Prescriber:  Patient Reported      Date I started using it:       Date I stopped using it:         Why I stopped using it:            Medication:  ASPIRIN 81 MG PO TABS      How I use it:  Take 81 mg by mouth daily      Why I use it:  Heart disease    Prescriber:  Patient Reported      Date I started using it:       Date I stopped using it:         Why I stopped using it:            Medication:  ATORVASTATIN CALCIUM 40 MG PO TABS      How I use it:  Take 1 tablet (40 mg) by mouth daily This is double dosage for cholesterol. Back to 20mg (1/2 pill) if bad body aches.      Why I use it: Cholesterol; Heart disease risk    Prescriber:  Santino Gordon MD      Date I started using it:       Date I stopped using it:         Why I stopped using it:            Medication:  DORZOLAMIDE HCL 2 % OP SOLN      How I use it:  Place 1 drop into the right eye 2 times daily      Why I use it:  Glaucoma    Prescriber:  Patient Reported      Date I started using it:       Date I stopped using it:         Why I stopped using it:            Medication:  FUROSEMIDE 40 MG PO TABS      How I use it:  TAKE 1 TABLET BY MOUTH EVERY MORNING      Why I use it: Blood pressure    Prescriber:  Santino Gordon MD      Date I started using it:       Date I stopped using it:         Why I stopped using it:            Medication:  LOSARTAN POTASSIUM 50 MG PO TABS      How I use it:  Take 1 tablet (50 mg) by mouth 2 times daily      Why I use it: Blood pressuer    Prescriber:  Santino Gordon MD      Date I started using it:       Date I stopped using it:         Why I stopped using it:            Medication:  LUMIGAN 0.01 % OP SOLN      How I use it:  Place 1 drop into both eyes At Bedtime       Why I use it:  Glaucoma    Prescriber:  Patient Reported      Date I started using it:       Date I stopped using it:         Why I stopped using it:            Medication:  METOPROLOL SUCCINATE ER 25 MG PO TB24      How I use it:  Take 25 mg by mouth  daily      Why I use it:  Glaucoma    Prescriber:  Patient Reported      Date I started using it:       Date I stopped using it:         Why I stopped using it:            Medication:  MULTIVITAMIN ADULTS 50+ PO TABS      How I use it:  Take 1 tablet by mouth daily      Why I use it: Vitamin Deficiency    Prescriber:  Patient Reported      Date I started using it:       Date I stopped using it:         Why I stopped using it:            Medication:  NITROGLYCERIN 0.4 MG SL SUBL      How I use it:  For chest pain place 1 tablet under the tongue every 5 minutes for 3 doses. If symptoms persist 5 minutes after 1st dose call 911.      Why I use it: Chest pain    Prescriber:  Santino Gordon MD      Date I started using it:       Date I stopped using it:         Why I stopped using it:            Medication:  OMEPRAZOLE 20 MG PO CPDR      How I use it:  Take 1 capsule (20 mg) by mouth daily x10 days then as needed for heartburn/upset stomach      Why I use it: Heartburn    Prescriber:  Santino Gordon MD      Date I started using it:       Date I stopped using it:         Why I stopped using it:            Medication:  POTASSIUM CHLORIDE ER 10 MEQ PO TBCR      How I use it:  TAKE 1 TABLET BY MOUTH TWICE DAILY      Why I use it: Low potassium    Prescriber:  Santino Gorodn MD      Date I started using it:       Date I stopped using it:         Why I stopped using it:            Medication:  VITAMIN D (CHOLECALCIFEROL) PO      How I use it:  Take 2 tablets by mouth daily      Why I use it:  Vitamin deficiency    Prescriber:  Patient Reported      Date I started using it:       Date I stopped using it:         Why I stopped using it:            Medication:  VITAMIN E 400 UNITS PO CAPS      How I use it:  Take 800 Units by mouth daily      Why I use it:  Vitamin deficiency    Prescriber:  Patient Reported      Date I started using it:       Date I stopped using it:         Why I stopped using it:             Medication:         How I use it:         Why I use it:      Prescriber:         Date I started using it:       Date I stopped using it:         Why I stopped using it:            Medication:         How I use it:         Why I use it:      Prescriber:         Date I started using it:       Date I stopped using it:         Why I stopped using it:            Medication:         How I use it:         Why I use it:      Prescriber:         Date I started using it:       Date I stopped using it:         Why I stopped using it:              Other Information:     If you have any questions about your action plan, call [phone].    According to the Paperwork Reduction Act of 1995, no persons are required to respond to a collection of information unless it displays a valid OMB control number. The valid OMB number for this information collection is 3218-2507. The time required to complete this information collection is estimated to average 40 minutes per response, including the time to review instructions, searching existing data resources, gather the data needed, and complete and review the information collection. If you have any comments concerning the accuracy of the time estimate(s) or suggestions for improving this form, please write to: CMS, Attn: [employer] Reports Clearance Officer, [address] [place] [id number].

## 2018-10-26 NOTE — PROGRESS NOTES
"SUBJECTIVE/OBJECTIVE:                                                    Halle Saucedo is a 89 year old female referred to MTM by her insurance company, [employer]. Halle was contacted for an initial MTM appointment, but declined MTM services. At the request of her insurance plan, a review of Halle's medication list and chart was completed.     Per Epic Chart:   Allergies   Allergen Reactions     Alphagan [Brimonidine Tartrate]      Azopt [Brinzolamide]      Beta Adrenergic Blockers      Bisphosphonates      Estrogens      Gemfibrozil      Xalatan [Latanoprost]      Zetia [Ezetimibe]      Zocor [Hmg-Coa-R Inhibitors]      Tobacco: No tobacco use   Alcohol: \"rare\" per chart    PMH: Reviewed in Epic    AFib/PVD/Hx of STEMIHypertension: Current medications include amlodipine 2.5 mg daily (per [facility] record), Eliquis 2.5 mg twice daily, aspirin 81 mg daily (clopidogrel was stopped at recent cardiology appointment), furosemide 40 mg daily, losartan 50 mg twice daily ([facility] records state 100 mg once daily), metoprolol XL 25 mg daily, potassium chloride 10 mEq twice daily, and nitroglycerin SL PRN (unclear if she has used).  Appears patient is having RN BP checks and monitoring at home.  Cardiology office had BP at 116/56 mmHg with a pulse of 72 bpm.  No adverse effects documented.    Hyperlipidemia: Current therapy includes atorvastatin 40 mg once daily.  No significant myalgias or other side effects documented.    GERD: Current medications include: Prilosec (omeprazole) 20 mg daily PRN (unclear of use, but only picked up prescription for 30 day supply in July 2018). No complaints noted in chart.    Glaucoma: Currently taking Lumigan 0.01% solution - 1 drop into both eyes once daily in the evening, and dorzolamide 2% eye drops - one drop into the right eye twice daily. No recent eye clinic notes available for review.    Supplements: Pt has vitamin E 800 units daily and either calcium/vitamin D daily OR vitamin D daily. "     ASSESSMENT:                                                       Current medications were reviewed with her PCP. Medicare Part D topics discussed:Medications reviewed-no issues identified or changes needed    AFib/PVD/Hx of STEMIHypertension: Stable. Pt is meeting BP goal of <140/90 mmHg. Eliquis dosing is appropriate based upon patient age>80 and weight <60 kg.      Hyperlipidemia: Stable. Pt is on high intensity statin which is indicated based on 2013 ACC/AHA guidelines for lipid management.      GERD: Stable.  Pt does not appear to be using daily.    Glaucoma: Unable to assess.    Supplements: Unable to assess as it's not clear if patient is taking.      PLAN:                                                      The following items were discussed with Halle's primary care provider: These considerations have not been communicated with the patient.    1. Continue current therapy.     The patient is due for the following Health Maintenance items:  Eye exam, influenza vaccine    I spent 45 minutes completing the chart review and discussing the findings with the primary care provider  (an extra 15 minutes was spent creating the Medication Action Plan).    Filiberto Watson, KeilaD, BCACP  Medication Therapy Management Pharmacist  Pager: [phone]

## 2018-11-24 DIAGNOSIS — I10 HYPERTENSION GOAL BP (BLOOD PRESSURE) < 150/90: ICD-10-CM

## 2018-11-26 RX ORDER — FUROSEMIDE 40 MG
TABLET ORAL
Qty: 90 TABLET | Refills: 0 | Status: SHIPPED | OUTPATIENT
Start: 2018-11-26 | End: 2019-01-31

## 2018-11-26 NOTE — PROGRESS NOTES
Findings resent to patient's PCP, Dr. Gordon, as it appears message was read but no response received.    Filiberto Watson, PharmD, [facility]  Medication Therapy Management Pharmacist  Pager: [phone]

## 2018-11-29 ENCOUNTER — TELEPHONE (OUTPATIENT)
Dept: PHARMACY | Facility: CLINIC | Age: 83
End: 2018-11-29

## 2018-11-29 NOTE — TELEPHONE ENCOUNTER
Dr. Gordon:    Resending this (from staff messages) as Medicare Part D requires us to a document a response to our chart reviews for reimbursement.     Halle was referred this year by her [employer] insurance plan for Medication Therapy Management (MTM) review. She declined a visit but her insurance requested we complete a chart review and communicate our findings with her providers.  I completed a review of her chart on 10/26/18 and other available information to review her medications for appropriateness, efficacy, safety, and adherence.       Based upon my review of available information I have NO recommendations for changes at this time. I did update her medication list based upon recent changes with [facility]     [facility] requests that we document a response from the provider in our encounter acknowledging receipt of this information and any additional steps they'd like us to take.     Please let me know if you have any questions.  We are happy to see/call Halle in the future for a more thorough review if you think it would be beneficial.     Filiberto Watson, KeilaD, [facility]   Medication Therapy Management Pharmacist   Pager: [phone]

## 2018-11-30 NOTE — PROGRESS NOTES
Received response from patient's PCP:    Santino Gordon MD      11/29/18 9:15 PM   Note      Ok noted. Agree. Santino Gordon MD          Will continue to monitor patient's chart for future needs.    Filiberto Watson, PharmD, [facility]  Medication Therapy Management Pharmacist  Pager: [phone]

## 2018-12-05 ENCOUNTER — TELEPHONE (OUTPATIENT)
Dept: FAMILY MEDICINE | Facility: CLINIC | Age: 83
End: 2018-12-05

## 2018-12-05 NOTE — TELEPHONE ENCOUNTER
Pt daughter notified of provider message as written.  Pt daughter verbalized good understanding.  Keysha YEN, RN

## 2018-12-05 NOTE — TELEPHONE ENCOUNTER
Anything but sudafed. mucinex (NOT D = sudafed). Nasal saline, laya pot and robitussin DM. md appointment/ urgent care if worse. Santino Gordon MD

## 2018-12-05 NOTE — TELEPHONE ENCOUNTER
Daughter calling states patient has a stuffy nose and coughing. Declined appointment want to know what over the counter meds she could try. Patient is on a lot of other meds wants to make sure it would be ok to take with her prescription medication. Has had the symptoms for two days daughter states.

## 2018-12-05 NOTE — TELEPHONE ENCOUNTER
Left message on answering machine for patient daughter to call back to [phone].  Keysha YEN, RN

## 2018-12-07 ENCOUNTER — OFFICE VISIT (OUTPATIENT)
Dept: FAMILY MEDICINE | Facility: CLINIC | Age: 83
End: 2018-12-07
Payer: COMMERCIAL

## 2018-12-07 ENCOUNTER — RADIANT APPOINTMENT (OUTPATIENT)
Dept: GENERAL RADIOLOGY | Facility: CLINIC | Age: 83
End: 2018-12-07
Attending: PHYSICIAN ASSISTANT
Payer: COMMERCIAL

## 2018-12-07 VITALS
RESPIRATION RATE: 24 BRPM | DIASTOLIC BLOOD PRESSURE: 86 MMHG | TEMPERATURE: 97.8 F | OXYGEN SATURATION: 96 % | HEART RATE: 68 BPM | SYSTOLIC BLOOD PRESSURE: 160 MMHG | WEIGHT: 124 LBS | BODY MASS INDEX: 23.43 KG/M2

## 2018-12-07 DIAGNOSIS — J18.9 PNEUMONIA OF LEFT LOWER LOBE DUE TO INFECTIOUS ORGANISM: Primary | ICD-10-CM

## 2018-12-07 PROCEDURE — 71046 X-RAY EXAM CHEST 2 VIEWS: CPT | Mod: FY

## 2018-12-07 PROCEDURE — 99213 OFFICE O/P EST LOW 20 MIN: CPT | Performed by: PHYSICIAN ASSISTANT

## 2018-12-07 RX ORDER — AZITHROMYCIN 250 MG/1
TABLET, FILM COATED ORAL
Qty: 6 TABLET | Refills: 0 | Status: SHIPPED | OUTPATIENT
Start: 2018-12-07 | End: 2018-12-07

## 2018-12-07 RX ORDER — BENZONATATE 200 MG/1
200 CAPSULE ORAL 3 TIMES DAILY PRN
Qty: 30 CAPSULE | Refills: 0 | Status: SHIPPED | OUTPATIENT
Start: 2018-12-07 | End: 2018-12-07

## 2018-12-07 RX ORDER — BENZONATATE 200 MG/1
200 CAPSULE ORAL 3 TIMES DAILY PRN
Qty: 30 CAPSULE | Refills: 0 | Status: SHIPPED | OUTPATIENT
Start: 2018-12-07 | End: 2018-12-13 | Stop reason: ALTCHOICE

## 2018-12-07 RX ORDER — AZITHROMYCIN 250 MG/1
TABLET, FILM COATED ORAL
Qty: 6 TABLET | Refills: 0 | Status: SHIPPED | OUTPATIENT
Start: 2018-12-07 | End: 2018-12-13

## 2018-12-07 ASSESSMENT — ENCOUNTER SYMPTOMS
COUGH: 1
CARDIOVASCULAR NEGATIVE: 1
EYE PAIN: 0
WHEEZING: 1
GASTROINTESTINAL NEGATIVE: 1
HEMOPTYSIS: 0
WEIGHT LOSS: 0
PALPITATIONS: 0
SPUTUM PRODUCTION: 1
DIAPHORESIS: 0
SHORTNESS OF BREATH: 1
FEVER: 1

## 2018-12-07 NOTE — MR AVS SNAPSHOT
After Visit Summary   12/7/2018    Halle Saucedo    MRN: [id number]           Patient Information     Date Of Birth          3/8/1926        Visit Information        Provider Department      12/7/2018 3:00 PM Anastasiya Webb PA-C [facility]        Today's Diagnoses     Pneumonia of left lower lobe due to infectious organism (H)    -  1       Follow-ups after your visit        Who to contact     If you have questions or need follow up information about today's clinic visit or your schedule please contact [facility] directly at [phone].  Normal or non-critical lab and imaging results will be communicated to you by MyChart, letter or phone within 4 business days after the clinic has received the results. If you do not hear from us within 7 days, please contact the clinic through MyChart or phone. If you have a critical or abnormal lab result, we will notify you by phone as soon as possible.  Submit refill requests through [employer] or call your pharmacy and they will forward the refill request to us. Please allow 3 business days for your refill to be completed.          Additional Information About Your Visit        Care EveryWhere ID     This is your Care EveryWhere ID. This could be used by other organizations to access your [facility] medical records  [id number]        Your Vitals Were     Pulse Temperature Respirations Pulse Oximetry BMI (Body Mass Index)       68 97.8  F (36.6  C) (Oral) 24 96% 23.43 kg/m2        Blood Pressure from Last 3 Encounters:   12/07/18 160/86   09/17/18 132/61   08/13/18 146/88    Weight from Last 3 Encounters:   12/07/18 124 lb (56.2 kg)   09/17/18 127 lb 6.4 oz (57.8 kg)   08/13/18 131 lb (59.4 kg)              We Performed the Following     XR Chest 2 Views          Today's Medication Changes          These changes are accurate as of 12/7/18  3:25 PM.  If you have any questions, ask your nurse or doctor.               Start taking these  medicines.        Dose/Directions    azithromycin 250 MG tablet   Commonly known as:  ZITHROMAX   Used for:  Pneumonia of left lower lobe due to infectious organism (H)   Started by:  Anastasiya Webb PA-C        2 tablets the first day, then 1 tablet daily for the next 4 days   Quantity:  6 tablet   Refills:  0       benzonatate 200 MG capsule   Commonly known as:  TESSALON   Used for:  Pneumonia of left lower lobe due to infectious organism (H)   Started by:  Anastasiya Webb PA-C        Dose:  200 mg   Take 1 capsule (200 mg) by mouth 3 times daily as needed for cough   Quantity:  30 capsule   Refills:  0            Where to get your medicines      These medications [facility] to [facility] [address] [place] [address] [address] [facility] [address] [facility]  [address] [facility] [address]     Phone:  [phone]     azithromycin 250 MG tablet    benzonatate 200 MG capsule                Primary Care Provider Office Phone # Fax #    Santino Gordon [phone] [address] [facility] [address]        Equal Access to Services     [facility] Hospital[facility] : Hadii ginny ku hadasho Sojackeline, waaxda luqadaha, qaybta kaalmada adeclementinayanir, jai rosado . So [facility] [phone].    ATENCIÓN: Si habla español, tiene a reza disposición servicios gratbartoloos de asistencia lingüística. [facility] [phone].    We comply with applicable federal civil rights laws and [place] laws. We do not discriminate on the basis of race, color, national origin, age, disability, sex, sexual orientation, or gender identity.            Thank you!     Thank you for choosing [facility]  for your care. Our goal is always to provide you with excellent care. Hearing back from our patients is one way we can continue to improve our services. Please take a few minutes to complete the written survey that you may receive in the mail after your visit with us. Thank  you!             Your Updated Medication List - Protect others around you: Learn how to safely use, store and throw away your medicines at www.disposemymeds.org.          This list is accurate as of 12/7/18  3:25 PM.  Always use your most recent med list.                   Brand Name Dispense Instructions for use Diagnosis    amLODIPine 5 MG tablet    NORVASC    30 tablet    Take 1 tablet (5 mg) by mouth daily New for blood pressure - take in AM. Can start with 1/2 dosage    Hypertension goal BP (blood pressure) < 150/90       aspirin 81 MG tablet    ASA     Take 81 mg by mouth daily        atorvastatin 40 MG tablet    LIPITOR    90 tablet    Take 1 tablet (40 mg) by mouth daily This is double dosage for cholesterol. Back to 20mg (1/2 pill) if bad body aches.    Hyperlipidemia LDL goal <130       azithromycin 250 MG tablet    ZITHROMAX    6 tablet    2 tablets the first day, then 1 tablet daily for the next 4 days    Pneumonia of left lower lobe due to infectious organism (H)       benzonatate 200 MG capsule    TESSALON    30 capsule    Take 1 capsule (200 mg) by mouth 3 times daily as needed for cough    Pneumonia of left lower lobe due to infectious organism (H)       dorzolamide 2 % ophthalmic solution    TRUSOPT     Place 1 drop into the right eye 2 times daily        ELIQUIS 2.5 MG tablet   Generic drug:  apixaban ANTICOAGULANT      Take by mouth 2 times daily        furosemide 40 MG tablet    LASIX    90 tablet    TAKE 1 TABLET BY MOUTH EVERY MORNING    Hypertension goal BP (blood pressure) < 150/90       losartan 50 MG tablet    COZAAR    180 tablet    Take 1 tablet (50 mg) by mouth 2 times daily    Benign essential hypertension       LUMIGAN 0.01 % Soln   Generic drug:  bimatoprost      Place 1 drop into both eyes At Bedtime        metoprolol succinate ER 25 MG 24 hr tablet    TOPROL-XL     Take 25 mg by mouth daily        MULTIVITAMIN ADULTS 50+ Tabs      Take 1 tablet by mouth daily        nitroGLYcerin  0.4 MG sublingual tablet    NITROSTAT     For chest pain place 1 tablet under the tongue every 5 minutes for 3 doses. If symptoms persist 5 minutes after 1st dose call 911.    Chest pain, unspecified type       omeprazole 20 MG DR capsule    priLOSEC    30 capsule    Take 1 capsule (20 mg) by mouth daily x10 days then as needed for heartburn/upset stomach    Gastroesophageal reflux disease with esophagitis       potassium chloride ER 10 MEQ CR tablet    K-TAB/KLOR-CON    180 tablet    TAKE 1 TABLET BY MOUTH TWICE DAILY    Hypertension goal BP (blood pressure) < 150/90       VITAMIN D (CHOLECALCIFEROL) PO      Take 2 tablets by mouth daily        vitamin E 400 units (180 mg) capsule    TOCOPHEROL     Take 800 Units by mouth daily

## 2018-12-07 NOTE — PROGRESS NOTES
SUBJECTIVE:      HPI  Halle Saucedo is a 89 year old female who presents to clinic today for the following health issues:  RESPIRATORY SYMPTOMS    Duration: 4days    Description  Productive cough along with shortness of breath and wheezing but no hemoptysis.     Severity: moderate    Accompanying signs and symptoms:  Also reports nasal congestion, rhinorrhea, fever, fatigue/malaise and off balance, R ear pain.  No abdominal pain, n/v, constipation, diarrhea, bloody or black tarry stools.      History (predisposing factors):  No ill contacts.  No pmh of asthma.  Non-smoker.    Precipitating or alleviating factors: None    Therapies tried and outcome:  Mucinex, rest and fluids- it made her really jittery so she does not want to use this anymore      Reviewed PMH, FMH and SOH.  Patient Active Problem List   Diagnosis     HYPERLIPIDEMIA LDL GOAL <130     Advanced directives, counseling/discussion     Glaucoma associated with anomalies of iris     PVD (peripheral vascular disease) (H)     Hard of hearing     Gout     Headache     GERD (gastroesophageal reflux disease)     Hypertension goal BP (blood pressure) < 150/90     Melanoma of skin (H)     Hypothyroidism     Other specified hypothyroidism     Seasonal allergic rhinitis, unspecified allergic rhinitis trigger     ERRONEOUS ENCOUNTER--DISREGARD     Stenosis of carotid artery, unspecified laterality     Atrial fibrillation, unspecified type (H)     ST elevation myocardial infarction (STEMI), unspecified artery (H)     Current Outpatient Prescriptions   Medication Sig Dispense Refill     amLODIPine (NORVASC) 5 MG tablet Take 1 tablet (5 mg) by mouth daily New for blood pressure - take in AM. Can start with 1/2 dosage 30 tablet 5     apixaban ANTICOAGULANT (ELIQUIS) 2.5 MG tablet Take by mouth 2 times daily       aspirin 81 MG tablet Take 81 mg by mouth daily       atorvastatin (LIPITOR) 40 MG tablet Take 1 tablet (40 mg) by mouth daily This is double dosage for  cholesterol. Back to 20mg (1/2 pill) if bad body aches. 90 tablet 3     dorzolamide (TRUSOPT) 2 % ophthalmic solution Place 1 drop into the right eye 2 times daily       furosemide (LASIX) 40 MG tablet TAKE 1 TABLET BY MOUTH EVERY MORNING 90 tablet 0     losartan (COZAAR) 50 MG tablet Take 1 tablet (50 mg) by mouth 2 times daily 180 tablet 1     LUMIGAN 0.01 % SOLN Place 1 drop into both eyes At Bedtime        metoprolol (TOPROL-XL) 25 MG 24 hr tablet Take 25 mg by mouth daily       Multiple Vitamins-Minerals (MULTIVITAMIN ADULTS 50+) TABS Take 1 tablet by mouth daily       nitroGLYcerin (NITROSTAT) 0.4 MG sublingual tablet For chest pain place 1 tablet under the tongue every 5 minutes for 3 doses. If symptoms persist 5 minutes after 1st dose call 911.       omeprazole (PRILOSEC) 20 MG CR capsule Take 1 capsule (20 mg) by mouth daily x10 days then as needed for heartburn/upset stomach 30 capsule 1     potassium chloride (K-TAB,KLOR-CON) 10 MEQ tablet TAKE 1 TABLET BY MOUTH TWICE DAILY 180 tablet 3     VITAMIN D, CHOLECALCIFEROL, PO Take 2 tablets by mouth daily       vitamin E 400 UNIT capsule Take 800 Units by mouth daily       Allergies   Allergen Reactions     Alphagan [Brimonidine Tartrate]      Azopt [Brinzolamide]      Beta Adrenergic Blockers      Bisphosphonates      Estrogens      Gemfibrozil      Xalatan [Latanoprost]      Zetia [Ezetimibe]      Zocor [Hmg-Coa-R Inhibitors]        Review of Systems   Constitutional: Positive for fever. Negative for diaphoresis and weight loss.   HENT: Positive for congestion.    Eyes: Negative for pain.   Respiratory: Positive for cough, sputum production, shortness of breath and wheezing. Negative for hemoptysis.    Cardiovascular: Negative.  Negative for chest pain and palpitations.   Gastrointestinal: Negative.    Skin: Negative.    All other systems reviewed and are negative.      /86  Pulse 68  Temp 97.8  F (36.6  C) (Oral)  Resp 24  Wt 124 lb (56.2 kg)   SpO2 96%  BMI 23.43 kg/m2  Physical Exam   Constitutional: She is oriented to person, place, and time and well-developed, well-nourished, and in no distress. No distress.   HENT:   Head: Normocephalic and atraumatic.   Nose: Nose normal.   Mouth/Throat: Oropharynx is clear and moist. No oropharyngeal exudate.   TMs are intact without any erythema or bulging bilaterally.  Airway is patent.   Eyes: Conjunctivae and EOM are normal. Pupils are equal, round, and reactive to light. No scleral icterus.   Neck: Normal range of motion. Neck supple. No thyromegaly present.   Cardiovascular: Normal rate, regular rhythm, normal heart sounds and intact distal pulses.  Exam reveals no gallop and no friction rub.    No murmur heard.  Pulmonary/Chest: Effort normal and breath sounds normal. No accessory muscle usage. No respiratory distress. She has no decreased breath sounds. She has no wheezes. She has no rhonchi. She has no rales.   Chest congestion   Lymphadenopathy:     She has no cervical adenopathy.   Neurological: She is alert and oriented to person, place, and time.   Skin: Skin is warm and dry. No cyanosis. Nails show no clubbing.   Psychiatric: Mood and affect normal.   Nursing note and vitals reviewed.  CXR PA/lateral:  Questionable LLL infiltrate.  No effusions or pneumothorax.  No suspicious nodules or lesions. No fractures.   Will send for overread.        Assessment/Plan:  Pneumonia of left lower lobe due to infectious organism (H):  CXR shows questionable LLL infiltrate.  Will treat with zithromax X5days, tessalon perles as needed for cough.  Declined albuterol inh as this can make her jittery as well. Recommend treatment with rest, fluids and chicken soup. Tylenol/ibuprofen prn fever/pain.  Recheck in clinic if symptoms worsen or if symptoms do not improve.  To the ER if she develops hemoptysis, chest pain, fevers>102, worsening shortness of breath/wheezing.  Repeat CXR in 1month with PCP to ensure resolution.  -      XR Chest 2 Views  -     azithromycin (ZITHROMAX) 250 MG tablet; 2 tablets the first day, then 1 tablet daily for the next 4 days  -     benzonatate (TESSALON) 200 MG capsule; Take 1 capsule (200 mg) by mouth 3 times daily as needed for cough          Anastasiya See KEENAN Webb

## 2018-12-07 NOTE — NURSING NOTE
"Chief Complaint   Patient presents with     Chronic Cough     Nose Problem       Initial /86  Pulse 68  Temp 97.8  F (36.6  C) (Oral)  Resp 24  Wt 124 lb (56.2 kg)  SpO2 96%  BMI 23.43 kg/m2 Estimated body mass index is 23.43 kg/(m^2) as calculated from the following:    Height as of 9/17/18: 5' 1\" (1.549 m).    Weight as of this encounter: 124 lb (56.2 kg)..  BP completed using cuff size: regular     "

## 2018-12-13 ENCOUNTER — OFFICE VISIT (OUTPATIENT)
Dept: FAMILY MEDICINE | Facility: CLINIC | Age: 83
End: 2018-12-13
Payer: COMMERCIAL

## 2018-12-13 VITALS
SYSTOLIC BLOOD PRESSURE: 160 MMHG | HEART RATE: 88 BPM | TEMPERATURE: 97.7 F | OXYGEN SATURATION: 97 % | WEIGHT: 122 LBS | DIASTOLIC BLOOD PRESSURE: 77 MMHG | BODY MASS INDEX: 23.05 KG/M2

## 2018-12-13 DIAGNOSIS — I25.110 CORONARY ARTERY DISEASE INVOLVING NATIVE CORONARY ARTERY OF NATIVE HEART WITH UNSTABLE ANGINA PECTORIS (H): ICD-10-CM

## 2018-12-13 DIAGNOSIS — R05.9 COUGH: Primary | ICD-10-CM

## 2018-12-13 DIAGNOSIS — I48.91 ATRIAL FIBRILLATION, UNSPECIFIED TYPE (H): ICD-10-CM

## 2018-12-13 DIAGNOSIS — J40 BRONCHITIS: ICD-10-CM

## 2018-12-13 PROCEDURE — 99214 OFFICE O/P EST MOD 30 MIN: CPT | Mod: 25 | Performed by: FAMILY MEDICINE

## 2018-12-13 PROCEDURE — 94640 AIRWAY INHALATION TREATMENT: CPT | Performed by: FAMILY MEDICINE

## 2018-12-13 RX ORDER — ALBUTEROL SULFATE 0.83 MG/ML
2.5 SOLUTION RESPIRATORY (INHALATION) ONCE
Status: COMPLETED | OUTPATIENT
Start: 2018-12-13 | End: 2018-12-13

## 2018-12-13 RX ORDER — CEFUROXIME AXETIL 250 MG/1
250 TABLET ORAL 2 TIMES DAILY
Qty: 20 TABLET | Refills: 0 | Status: SHIPPED | OUTPATIENT
Start: 2018-12-13 | End: 2018-12-13 | Stop reason: ALTCHOICE

## 2018-12-13 RX ORDER — CODEINE PHOSPHATE AND GUAIFENESIN 10; 100 MG/5ML; MG/5ML
1-2 SOLUTION ORAL EVERY 4 HOURS PRN
Qty: 240 ML | Refills: 1 | Status: SHIPPED | OUTPATIENT
Start: 2018-12-13 | End: 2018-12-13 | Stop reason: ALTCHOICE

## 2018-12-13 RX ADMIN — ALBUTEROL SULFATE 2.5 MG: 0.83 SOLUTION RESPIRATORY (INHALATION) at 16:00

## 2018-12-13 ASSESSMENT — PAIN SCALES - GENERAL: PAINLEVEL: NO PAIN (0)

## 2018-12-13 NOTE — PROGRESS NOTES
CHIEF COMPLAINT    Persistent cough      HISTORY    She was treated 12-7. Took Z Selwyn. Tessalon doesn't seem to suppress cough and she is tiring.  She has frequent congested cough without a great deal of sputum production.    She developed some coronary disease in the last year and had stents placed.  She has atrial fibrillation.  She is on a blood thinner.  Not having hemoptysis.      Patient Active Problem List   Diagnosis     HYPERLIPIDEMIA LDL GOAL <130     Advanced directives, counseling/discussion     Glaucoma associated with anomalies of iris     PVD (peripheral vascular disease) (H)     Hard of hearing     Gout     Headache     GERD (gastroesophageal reflux disease)     Hypertension goal BP (blood pressure) < 150/90     Melanoma of skin (H)     Hypothyroidism     Other specified hypothyroidism     Seasonal allergic rhinitis, unspecified allergic rhinitis trigger     ERRONEOUS ENCOUNTER--DISREGARD     Stenosis of carotid artery, unspecified laterality     Atrial fibrillation, unspecified type (H)     ST elevation myocardial infarction (STEMI), unspecified artery (H)       Current Outpatient Medications   Medication Sig Dispense Refill     amLODIPine (NORVASC) 5 MG tablet Take 1 tablet (5 mg) by mouth daily New for blood pressure - take in AM. Can start with 1/2 dosage 30 tablet 5     apixaban ANTICOAGULANT (ELIQUIS) 2.5 MG tablet Take by mouth 2 times daily       aspirin 81 MG tablet Take 81 mg by mouth daily       atorvastatin (LIPITOR) 40 MG tablet Take 1 tablet (40 mg) by mouth daily This is double dosage for cholesterol. Back to 20mg (1/2 pill) if bad body aches. 90 tablet 3     benzonatate (TESSALON) 200 MG capsule Take 1 capsule (200 mg) by mouth 3 times daily as needed for cough 30 capsule 0     dorzolamide (TRUSOPT) 2 % ophthalmic solution Place 1 drop into the right eye 2 times daily       furosemide (LASIX) 40 MG tablet TAKE 1 TABLET BY MOUTH EVERY MORNING 90 tablet 0     losartan (COZAAR) 50 MG  tablet Take 1 tablet (50 mg) by mouth 2 times daily 180 tablet 1     LUMIGAN 0.01 % SOLN Place 1 drop into both eyes At Bedtime        metoprolol (TOPROL-XL) 25 MG 24 hr tablet Take 25 mg by mouth daily       Multiple Vitamins-Minerals (MULTIVITAMIN ADULTS 50+) TABS Take 1 tablet by mouth daily       nitroGLYcerin (NITROSTAT) 0.4 MG sublingual tablet For chest pain place 1 tablet under the tongue every 5 minutes for 3 doses. If symptoms persist 5 minutes after 1st dose call 911.       omeprazole (PRILOSEC) 20 MG CR capsule Take 1 capsule (20 mg) by mouth daily x10 days then as needed for heartburn/upset stomach 30 capsule 1     potassium chloride (K-TAB,KLOR-CON) 10 MEQ tablet TAKE 1 TABLET BY MOUTH TWICE DAILY 180 tablet 3     VITAMIN D, CHOLECALCIFEROL, PO Take 2 tablets by mouth daily       vitamin E 400 UNIT capsule Take 800 Units by mouth daily         REVIEW OF SYSTEMS    No fever.  No chest pain.  No edema.  No palpitations.  Decreased appetite.  No abdominal pain or vomiting.  No urinary difficulty.  No focal weakness.      Past Medical History:   Diagnosis Date     Glaucoma     left and right eyes     Other and unspecified hyperlipidemia      Unspecified essential hypertension        EXAM  /77   Pulse 88   Temp 97.7  F (36.5  C) (Oral)   Wt 55.3 kg (122 lb)   SpO2 97%   BMI 23.05 kg/m      Thin, elderly woman with frequent cough.  HEENT shows normal pharynx.  Clear sclera.  Neck without masses.  Chest.  Kyphosis.  Scattered rales and rhonchi throughout both lung fields.  Still seems to be moving air fairly well.  Cardiac rhythm is irregular.  There is a grade 2 systolic murmur.  Rate appears controlled.  Abdomen nondistended.  Extremities without edema.  Patient is alert.  Motor intact.      1 albuterol neb was administered with apparent slight improvement in cough.      (R05) Cough  (primary encounter diagnosis)  Comment:     Initially I was planning to start a different antibiotic such as  Ceftin.  Due to her frequent congested cough, weakness and her daughter's concerns, recommendation is changed to present to the emergency room at [facility].  Obtain further evaluation and consider whether she might be better admitted.  Patient and daughter are agreeable with this plan.    Plan: above.    (J40) Bronchitis  Comment:   Plan: INHALATION/NEBULIZER TREATMENT, INITIAL,         albuterol (PROVENTIL) neb solution 2.5 mg,         DISCONTINUED: cefuroxime (CEFTIN) 250 MG         tablet, DISCONTINUED: guaiFENesin-codeine         (ROBITUSSIN AC) 100-10 MG/5ML solution            (I25.110) Coronary artery disease involving native coronary artery of native heart with unstable angina pectoris (H)  Comment:   Plan:     (I48.91) Atrial fibrillation, unspecified type (H)  Comment:   Plan:

## 2018-12-13 NOTE — NURSING NOTE
"Chief Complaint   Patient presents with     RECHECK     pneumonia - finished abx - still having cough -with phlegm        Initial /77   Pulse 88   Temp 97.7  F (36.5  C) (Oral)   Wt 55.3 kg (122 lb)   SpO2 97%   BMI 23.05 kg/m   Estimated body mass index is 23.05 kg/m  as calculated from the following:    Height as of 9/17/18: 1.549 m (5' 1\").    Weight as of this encounter: 55.3 kg (122 lb).  Medication Reconciliation: complete  Ramya Gillespie M.A.    "

## 2018-12-17 ENCOUNTER — TELEPHONE (OUTPATIENT)
Dept: FAMILY MEDICINE | Facility: CLINIC | Age: 83
End: 2018-12-17

## 2018-12-17 NOTE — TELEPHONE ENCOUNTER
Pt daughter has two questions.  1) if pt is improving does she still need a follow up appointment?    2) the ER provider heard a heart murmur but could not find it mentioned in the chart, the pt seemed to know about it. When I search in epic it does appear in care everywhere notes.  Are you aware of the heart murmur and does she need to follow up with someone about it?    Keysha YEN, RN

## 2018-12-17 NOTE — TELEPHONE ENCOUNTER
I'm ok with cancelling ed visit appointment if better but due to see myself/labs in next 1-2 months and we can address murmur at that time. Return to clinic sooner if new issues/concerns.Santino Gordon MD

## 2018-12-17 NOTE — TELEPHONE ENCOUNTER
Daughter Vidal called:  Halle went to [facility] (per recommendation from Dr Velasco)  They mentioned she had a heart murmur.  Is clinic aware of this?  Does she need to be seen?  Hospital did recommend a followup appt.

## 2019-01-21 DIAGNOSIS — E78.5 HYPERLIPIDEMIA LDL GOAL <130: ICD-10-CM

## 2019-01-21 DIAGNOSIS — I10 BENIGN ESSENTIAL HYPERTENSION: ICD-10-CM

## 2019-01-23 RX ORDER — LOSARTAN POTASSIUM 50 MG/1
TABLET ORAL
Qty: 60 TABLET | Refills: 0 | Status: SHIPPED | OUTPATIENT
Start: 2019-01-23 | End: 2019-01-31

## 2019-01-23 RX ORDER — ATORVASTATIN CALCIUM 40 MG/1
TABLET, FILM COATED ORAL
Qty: 30 TABLET | Refills: 0 | Status: SHIPPED | OUTPATIENT
Start: 2019-01-23 | End: 2019-01-31

## 2019-01-31 ENCOUNTER — OFFICE VISIT (OUTPATIENT)
Dept: FAMILY MEDICINE | Facility: CLINIC | Age: 84
End: 2019-01-31
Payer: COMMERCIAL

## 2019-01-31 VITALS
TEMPERATURE: 97.8 F | HEIGHT: 61 IN | OXYGEN SATURATION: 98 % | BODY MASS INDEX: 23.41 KG/M2 | SYSTOLIC BLOOD PRESSURE: 132 MMHG | HEART RATE: 53 BPM | DIASTOLIC BLOOD PRESSURE: 80 MMHG | RESPIRATION RATE: 16 BRPM | WEIGHT: 124 LBS

## 2019-01-31 DIAGNOSIS — I10 HYPERTENSION GOAL BP (BLOOD PRESSURE) < 150/90: Primary | ICD-10-CM

## 2019-01-31 DIAGNOSIS — R05.9 COUGH: ICD-10-CM

## 2019-01-31 DIAGNOSIS — I10 BENIGN ESSENTIAL HYPERTENSION: ICD-10-CM

## 2019-01-31 DIAGNOSIS — R01.1 HEART MURMUR: ICD-10-CM

## 2019-01-31 DIAGNOSIS — E78.5 HYPERLIPIDEMIA LDL GOAL <130: ICD-10-CM

## 2019-01-31 PROCEDURE — 99214 OFFICE O/P EST MOD 30 MIN: CPT | Performed by: FAMILY MEDICINE

## 2019-01-31 RX ORDER — FUROSEMIDE 40 MG
40 TABLET ORAL EVERY MORNING
Qty: 90 TABLET | Refills: 1 | Status: SHIPPED | OUTPATIENT
Start: 2019-01-31 | End: 2019-09-27

## 2019-01-31 RX ORDER — ATORVASTATIN CALCIUM 40 MG/1
40 TABLET, FILM COATED ORAL DAILY
Qty: 90 TABLET | Refills: 3 | Status: SHIPPED | OUTPATIENT
Start: 2019-01-31 | End: 2019-09-27

## 2019-01-31 RX ORDER — AMLODIPINE BESYLATE 5 MG/1
5 TABLET ORAL DAILY
Qty: 90 TABLET | Refills: 1 | Status: SHIPPED | OUTPATIENT
Start: 2019-01-31 | End: 2019-09-11

## 2019-01-31 RX ORDER — LOSARTAN POTASSIUM 50 MG/1
TABLET ORAL
Qty: 180 TABLET | Refills: 1 | Status: SHIPPED | OUTPATIENT
Start: 2019-01-31 | End: 2019-09-11

## 2019-01-31 ASSESSMENT — MIFFLIN-ST. JEOR: SCORE: 909.84

## 2019-01-31 NOTE — PROGRESS NOTES
SUBJECTIVE:   Halle Saucedo is a 89 year old female who presents to clinic today for the following health issues:  Follow-up ed visit for cough.had ekg, bnp, trop and xray all ok. Seeing cardioloyg s/p angioplasty after MI. Echo 2 years ago ok. Cough resolved. No fevers. Non-smoker.  gerd - stable. Chronic rhinorrhea. History heart murmur.  No chest pain or shortness of breath or LIGHTHEADED. Emotionally doing ok. History a.fib and murmur heard in ER. Seen cardiology in fall - heart ok.   Outside blood pressure reading 120-130/66-85.    Per ED note:  Impression and Plan:  Halle Saucedo is a 89 y.o. female with a past medical history of hypertension and STEMI who comes to the emergency department with cough and high blood pressure. She recently completed a course of azythromicin and was sent here from her clinic out of concern of her persistent symptoms. Her physical exam on my evaluation is reassuring. She has EKG with PVC's and atrial fibrillation however her troponin and BNP are negative here. Initially it was my understanding that the neb treatment she had at urgent care was helpful, however after further discussion, she stated only during the very moments that she was breathing it in did she find it useful. I had made my plan initially and thought she might benefit from a course of steroids, but further reflection given her elevated blood pressure and elderly state I think that steroids might do more harm than good, specifically since they didn't offer any lasting improvement after the neb treatment. Subsequently I cancelled the order for steroids and then went back to the initial primary care doctor's plan of cefuroxime and Robitussin with codeine. She does have a heart murmur, this was noted earlier in clinic and has been intermittently noted in the records in the past however, it may be partially louder than it was. I recommended that she consider getting a follow-up cardiac ultrasound. At this point in time  "without signs of heart failure or heart injury her persistent cough is likely due to either a subtle bacterial pneumonia that was inadequately treated with azythromicin or perhaps a persistent viral syndrome.            ED/UC Followup:    Facility:  [facility]   Date of visit: 12-  Reason for visit: follow up bronchitis/ heart MurMur  Current Status: she feels better        Past Medical History:   Diagnosis Date     Glaucoma     left and right eyes     Other and unspecified hyperlipidemia      Unspecified essential hypertension        Past Surgical History:   Procedure Laterality Date     CATARACT IOL, RT/LT       D & C       TONSILLECTOMY & ADENOIDECTOMY      childhood       Family History   Problem Relation Age of Onset     Hypertension Brother      Hypertension Sister      Hypertension Sister        Social History     Tobacco Use     Smoking status: Never Smoker     Smokeless tobacco: Never Used   Substance Use Topics     Alcohol use: Yes     Comment: rare       ROS:  All other ROS negative.     OBJECTIVE:  /80   Pulse 53   Temp 97.8  F (36.6  C) (Oral)   Resp 16   Ht 1.549 m (5' 1\")   Wt 56.2 kg (124 lb)   SpO2 98%   BMI 23.43 kg/m    EXAM:  GENERAL APPEARANCE: healthy, alert and no distress  NECK: no adenopathy, no asymmetry, masses, or scars and thyroid normal to palpation  RESP: lungs clear to auscultation - no rales, rhonchi or wheezes  CV:IRIR with 3/6 sys murmur.   ABDOMEN:  soft, nontender, no HSM or masses and bowel sounds normal  MS: extremities normal- no gross deformities noted, no evidence of inflammation in joints, FROM in all extremities.  PSYCH: mentation appears normal and affect normal/bright    ASSESSMENT / PLAN:  (I10) Hypertension goal BP (blood pressure) < 150/90  (primary encounter diagnosis)  Comment: stable  Plan: amLODIPine (NORVASC) 5 MG tablet, furosemide         (LASIX) 40 MG tablet        Continue self-monitor and take meds. Reveiwed risks and side effects of " medication  Recheck in 6 months - sooner if worse. Call/email with questions/concerns.   Chest pain or shortness of breath to er.     (E78.5) Hyperlipidemia LDL goal <130  Comment: stable  Plan: atorvastatin (LIPITOR) 40 MG tablet        Recheck in 6 months      (I10) Benign essential hypertension    Plan: losartan (COZAAR) 50 MG tablet            (R01.1) Heart murmur  Comment: mild and normal chest xraybnp in er.   Plan: per cardiology. No symptoms. Repeat echo if worse or symptoms - LIGHTHEADED/ shortness of breath/edema/etc. Call/email with questions/concerns. Expected course and warning signs reviewed.     (R05) Cough  Comment: resolved. Normal xray.        There are no Patient Instructions on file for this visit.  Santino Gordon

## 2019-01-31 NOTE — NURSING NOTE
"Chief Complaint   Patient presents with     Heart Problem     Hospital F/U       Initial /70   Pulse 53   Temp 97.8  F (36.6  C) (Oral)   Resp 16   Ht 1.549 m (5' 1\")   Wt 56.2 kg (124 lb)   SpO2 98%   BMI 23.43 kg/m   Estimated body mass index is 23.43 kg/m  as calculated from the following:    Height as of this encounter: 1.549 m (5' 1\").    Weight as of this encounter: 56.2 kg (124 lb).    Keysha Kumar, CMA    "

## 2019-02-27 DIAGNOSIS — I10 HYPERTENSION GOAL BP (BLOOD PRESSURE) < 150/90: ICD-10-CM

## 2019-02-27 RX ORDER — FUROSEMIDE 40 MG
TABLET ORAL
Qty: 90 TABLET | Refills: 0 | OUTPATIENT
Start: 2019-02-27

## 2019-04-29 ENCOUNTER — TRANSFERRED RECORDS (OUTPATIENT)
Dept: HEALTH INFORMATION MANAGEMENT | Facility: CLINIC | Age: 84
End: 2019-04-29

## 2019-09-09 ENCOUNTER — OFFICE VISIT (OUTPATIENT)
Dept: ORTHOPEDICS | Facility: CLINIC | Age: 84
End: 2019-09-09
Payer: COMMERCIAL

## 2019-09-09 ENCOUNTER — ANCILLARY PROCEDURE (OUTPATIENT)
Dept: GENERAL RADIOLOGY | Facility: CLINIC | Age: 84
End: 2019-09-09
Attending: ORTHOPAEDIC SURGERY
Payer: COMMERCIAL

## 2019-09-09 VITALS
HEIGHT: 61 IN | SYSTOLIC BLOOD PRESSURE: 180 MMHG | DIASTOLIC BLOOD PRESSURE: 98 MMHG | HEART RATE: 77 BPM | BODY MASS INDEX: 25.26 KG/M2 | WEIGHT: 133.8 LBS

## 2019-09-09 DIAGNOSIS — M16.0 PRIMARY OSTEOARTHRITIS OF BOTH HIPS: ICD-10-CM

## 2019-09-09 DIAGNOSIS — M25.552 GREATER TROCHANTERIC PAIN SYNDROME OF LEFT LOWER EXTREMITY: ICD-10-CM

## 2019-09-09 DIAGNOSIS — M25.552 HIP PAIN, LEFT: ICD-10-CM

## 2019-09-09 DIAGNOSIS — M25.552 HIP PAIN, LEFT: Primary | ICD-10-CM

## 2019-09-09 PROCEDURE — 73502 X-RAY EXAM HIP UNI 2-3 VIEWS: CPT

## 2019-09-09 PROCEDURE — 99203 OFFICE O/P NEW LOW 30 MIN: CPT | Performed by: ORTHOPAEDIC SURGERY

## 2019-09-09 ASSESSMENT — MIFFLIN-ST. JEOR: SCORE: 949.29

## 2019-09-09 ASSESSMENT — PAIN SCALES - GENERAL: PAINLEVEL: MILD PAIN (3)

## 2019-09-09 NOTE — PROGRESS NOTES
Chief Complaint:   Chief Complaint   Patient presents with     Left Hip - Pain     Onset: 3 wks ago. Pain just started. NKI. Pain is in the buttock region. Pain doesn't go down the leg. Pain has gotten better since initial onset. One day she could not even walk. Denies any N/T. Patient is accompanied by daughter, Vidal.        HISTORY OF PRESENT ILLNESS    Halle Saucedo is a 89 year old female seen for evaluation of ongoing left hip pain with no known injury.   Pain has been present for 3 weeks. Locates pain in the buttock region and side of hip and does not radiate down the leg. Has improved since onset. Early on had difficulties with walking and getting in/out of car but that is much better. Denies numbness and tingling. No treatments. Denies prior problems. Denies groin pain. Denies low back pain.    Present symptoms: mild pain.    Pain severity: 3/10  Pain quality: aching  Frequency of symptoms: occasionally  Exacerbating Factors: walking, stairs, in /out of car  Relieving Factors: rest, sitting  Night Pain: occasional, with movement.  Pain while at rest: No   Associated numbness or tingling: No     Orthopedic PMH: bilateral knee osteoarthritis.    Other PMH:  has a past medical history of Glaucoma, Other and unspecified hyperlipidemia, and Unspecified essential hypertension.  Patient Active Problem List   Diagnosis     HYPERLIPIDEMIA LDL GOAL <130     Advanced directives, counseling/discussion     Glaucoma associated with anomalies of iris     PVD (peripheral vascular disease) (H)     Hard of hearing     Gout     Headache     GERD (gastroesophageal reflux disease)     Hypertension goal BP (blood pressure) < 150/90     Melanoma of skin (H)     Hypothyroidism     Other specified hypothyroidism     Seasonal allergic rhinitis, unspecified allergic rhinitis trigger     ERRONEOUS ENCOUNTER--DISREGARD     Stenosis of carotid artery, unspecified laterality     Atrial fibrillation, unspecified type (H)     ST elevation  myocardial infarction (STEMI), unspecified artery (H)     Coronary artery disease involving native coronary artery of native heart with unstable angina pectoris (H)       Surgical Hx:  has a past surgical history that includes cataract iol, rt/lt; tonsillectomy & adenoidectomy; and d & c.    Medications:   Current Outpatient Medications:      amLODIPine (NORVASC) 5 MG tablet, Take 1 tablet (5 mg) by mouth daily for blood pressure, Disp: 90 tablet, Rfl: 1     apixaban ANTICOAGULANT (ELIQUIS) 2.5 MG tablet, Take by mouth 2 times daily, Disp: , Rfl:      aspirin 81 MG tablet, Take 81 mg by mouth daily, Disp: , Rfl:      atorvastatin (LIPITOR) 40 MG tablet, Take 1 tablet (40 mg) by mouth daily For cholesterol, Disp: 90 tablet, Rfl: 3     furosemide (LASIX) 40 MG tablet, Take 1 tablet (40 mg) by mouth every morning, Disp: 90 tablet, Rfl: 1     losartan (COZAAR) 50 MG tablet, TAKE 1 TABLET(50 MG) BY MOUTH TWICE DAILY for blood pressure, Disp: 180 tablet, Rfl: 1     LUMIGAN 0.01 % SOLN, Place 1 drop into both eyes At Bedtime , Disp: , Rfl:      metoprolol (TOPROL-XL) 25 MG 24 hr tablet, Take 25 mg by mouth daily, Disp: , Rfl:      Multiple Vitamins-Minerals (MULTIVITAMIN ADULTS 50+) TABS, Take 1 tablet by mouth daily, Disp: , Rfl:      netarsudil dimesylate (RHOPRESSA) 0.02 % SOLN, At Bedtime, Disp: , Rfl:      nitroGLYcerin (NITROSTAT) 0.4 MG sublingual tablet, For chest pain place 1 tablet under the tongue every 5 minutes for 3 doses. If symptoms persist 5 minutes after 1st dose call 911., Disp: , Rfl:      omeprazole (PRILOSEC) 20 MG CR capsule, Take 1 capsule (20 mg) by mouth daily x10 days then as needed for heartburn/upset stomach, Disp: 30 capsule, Rfl: 1     potassium chloride (K-TAB,KLOR-CON) 10 MEQ tablet, TAKE 1 TABLET BY MOUTH TWICE DAILY, Disp: 180 tablet, Rfl: 3     VITAMIN D, CHOLECALCIFEROL, PO, Take 2 tablets by mouth daily, Disp: , Rfl:      vitamin E 400 UNIT capsule, Take 800 Units by mouth daily, Disp:  ", Rfl:     Allergies:   Allergies   Allergen Reactions     Alphagan [Brimonidine Tartrate]      Azopt [Brinzolamide]      Beta Adrenergic Blockers      Bisphosphonates      Estrogens      Gemfibrozil      Trusopt [Dorzolamide] Itching     rash     Xalatan [Latanoprost]      Zetia [Ezetimibe]      Zocor [Hmg-Coa-R Inhibitors]        Social Hx: retired.  reports that she has never smoked. She has never used smokeless tobacco. She reports that she drinks alcohol. She reports that she does not use drugs.    Family Hx: family history includes Hypertension in her brother, sister, and sister.    REVIEW OF SYSTEMS:  10 point ROS neg other than the symptoms noted above in the HPI and PAST MEDICAL HISTORY. Notables,  CONSTITUTIONAL:NEGATIVE for fever, chills, change in weight  INTEGUMENTARY/SKIN: NEGATIVE for worrisome rashes, moles or lesions  MUSCULOSKELETAL:See HPI above  NEURO: NEGATIVE for weakness, dizziness or paresthesias    PHYSICAL EXAM:  BP (!) 180/98   Pulse 77   Ht 1.549 m (5' 1\")   Wt 60.7 kg (133 lb 12.8 oz)   BMI 25.28 kg/m     GENERAL APPEARANCE: elderly, alert, no distress; accompanied by her daughter.  SKIN: no suspicious lesions or rashes  NEURO: decreased  strength and tone, mentation intact and speech normal  PSYCH:  mentation appears normal and affect normal  RESPIRATORY: No increased work of breathing.  LYMPH: no palpable inguinal lymph nodes.    BILATERAL LOWER EXTREMITIES:  Gait: slight favors left, hunched.  No gross deformities or masses.  Bilateral Quad atrophy, strength weak  Intact sensation deep peroneal nerve, superficial peroneal nerve, med/lat tibial nerve, sural nerve, saphenous nerve  Intact EHL, EDL, TA, FHL, GS, quadriceps hamstrings and hip flexors  Toes warm and well perfused, brisk capillary refill. Palpable 2+ dp pulses.  Bilateral calf soft and nttp or squeeze.  DTRs: achilles 2+, patella 2+.  Edema: 1+  Leg lengths: not tested      LEFT HIP EXAM:      Palpation: Tender:   left " greater trochanter, left gluteus medius, left iliac crest, left SI joint and buttock, iliotibial band to the knee   Nontender: left groin, anterior thigh, posterior thigh.  Strength:  Grossly intact, weak.  Special tests:  Irritability (flexion/adduction/internal rotation) mild  Hip range of motion: Internal rotation: 15, External rotation: 35, Flexion 95, pain with extremes of internal rotation       RIGHT HIP EXAM:    Palpation: Tender:   Right buttock  Strength:  weak  Special tests:  Irritability (flexion/adduction/internal rotation) negative.  Hip range of motion: Internal rotation: 15, External rotation: 35, Flexion 95        X-RAY: AP pelvis and AP/Lateral views left hip from 9/9/2019 were reviewed in clinic today. No obvious fractures or dislocations. Severe  bilateral hip degenerative changes. Mild-to-moderate degenerative changes of the sacroiliac joints and  lower lumbar spine. Osteopenia. Atherosclerosis. Left femoral vascular stent.      Impression: 89yo female with acute left hip/buttock pain, trochanteric bursitis and gluteal and iliotibial band tendonitis. Notable bilateral primary hip osteoarthritis.    Plan:   * glad to hear that overall improved since onset.     * reviewed xrays with patient. Noted osteoarthritis of the hips, SI joints and low back.  * exam most consistent with soft tissue pain, tendonitis and bursitis.  * doesn't appear that the hip joints themselves are causing that much discomfort.    Discussed various treatment options, including:    * Activity modification - avoid impact activities or activities that aggravate symptoms.  * NSAIDS - regular use for inflammation ( twice daily or three times daily), with food, as long as no contra-indications Please discuss with pcp if needed  * Stretching of iliotibial band.  * PT  for strengthening, stretching and range of motion exercises, especially stretching of iliotibial band. Modalities as indicated.  * Tylenol as needed for pain  *  avoid laying on affected side.  * ice/ heat as needed.  * Injections: risks and perceived benefits of trochanteric bursal cortisone injections discussed. Patient elected not to proceed at this time as symptoms are much improving since onset.  * Return to clinic as needed.      Scar Maharaj M.D., M.S.  Dept. of Orthopaedic Surgery  [facility]

## 2019-09-09 NOTE — LETTER
9/9/2019         RE: Halle Saucedo  [id number] [facility] [id number]        Dear Colleague,    Thank you for referring your patient, Halle Saucedo, to the [facility]. Please see a copy of my visit note below.    Chief Complaint:   Chief Complaint   Patient presents with     Left Hip - Pain     Onset: 3 wks ago. Pain just started. NKI. Pain is in the buttock region. Pain doesn't go down the leg. Pain has gotten better since initial onset. One day she could not even walk. Denies any N/T. Patient is accompanied by daughter, Vidal.        HISTORY OF PRESENT ILLNESS    Halle Saucedo is a 89 year old female seen for evaluation of ongoing left hip pain with no known injury.   Pain has been present for 3 weeks. Locates pain in the buttock region and side of hip and does not radiate down the leg. Has improved since onset. Early on had difficulties with walking and getting in/out of car but that is much better. Denies numbness and tingling. No treatments. Denies prior problems. Denies groin pain. Denies low back pain.    Present symptoms: mild pain.    Pain severity: 3/10  Pain quality: aching  Frequency of symptoms: occasionally  Exacerbating Factors: walking, stairs, in /out of car  Relieving Factors: rest, sitting  Night Pain: occasional, with movement.  Pain while at rest: No   Associated numbness or tingling: No     Orthopedic PMH: bilateral knee osteoarthritis.    Other PMH:  has a past medical history of Glaucoma, Other and unspecified hyperlipidemia, and Unspecified essential hypertension.  Patient Active Problem List   Diagnosis     HYPERLIPIDEMIA LDL GOAL <130     Advanced directives, counseling/discussion     Glaucoma associated with anomalies of iris     PVD (peripheral vascular disease) (H)     Hard of hearing     Gout     Headache     GERD (gastroesophageal reflux disease)     Hypertension goal BP (blood pressure) < 150/90     Melanoma of skin (H)     Hypothyroidism      Other specified hypothyroidism     Seasonal allergic rhinitis, unspecified allergic rhinitis trigger     ERRONEOUS ENCOUNTER--DISREGARD     Stenosis of carotid artery, unspecified laterality     Atrial fibrillation, unspecified type (H)     ST elevation myocardial infarction (STEMI), unspecified artery (H)     Coronary artery disease involving native coronary artery of native heart with unstable angina pectoris (H)       Surgical Hx:  has a past surgical history that includes cataract iol, rt/lt; tonsillectomy & adenoidectomy; and d & c.    Medications:   Current Outpatient Medications:      amLODIPine (NORVASC) 5 MG tablet, Take 1 tablet (5 mg) by mouth daily for blood pressure, Disp: 90 tablet, Rfl: 1     apixaban ANTICOAGULANT (ELIQUIS) 2.5 MG tablet, Take by mouth 2 times daily, Disp: , Rfl:      aspirin 81 MG tablet, Take 81 mg by mouth daily, Disp: , Rfl:      atorvastatin (LIPITOR) 40 MG tablet, Take 1 tablet (40 mg) by mouth daily For cholesterol, Disp: 90 tablet, Rfl: 3     furosemide (LASIX) 40 MG tablet, Take 1 tablet (40 mg) by mouth every morning, Disp: 90 tablet, Rfl: 1     losartan (COZAAR) 50 MG tablet, TAKE 1 TABLET(50 MG) BY MOUTH TWICE DAILY for blood pressure, Disp: 180 tablet, Rfl: 1     LUMIGAN 0.01 % SOLN, Place 1 drop into both eyes At Bedtime , Disp: , Rfl:      metoprolol (TOPROL-XL) 25 MG 24 hr tablet, Take 25 mg by mouth daily, Disp: , Rfl:      Multiple Vitamins-Minerals (MULTIVITAMIN ADULTS 50+) TABS, Take 1 tablet by mouth daily, Disp: , Rfl:      netarsudil dimesylate (RHOPRESSA) 0.02 % SOLN, At Bedtime, Disp: , Rfl:      nitroGLYcerin (NITROSTAT) 0.4 MG sublingual tablet, For chest pain place 1 tablet under the tongue every 5 minutes for 3 doses. If symptoms persist 5 minutes after 1st dose call 911., Disp: , Rfl:      omeprazole (PRILOSEC) 20 MG CR capsule, Take 1 capsule (20 mg) by mouth daily x10 days then as needed for heartburn/upset stomach, Disp: 30 capsule, Rfl: 1      "potassium chloride (K-TAB,KLOR-CON) 10 MEQ tablet, TAKE 1 TABLET BY MOUTH TWICE DAILY, Disp: 180 tablet, Rfl: 3     VITAMIN D, CHOLECALCIFEROL, PO, Take 2 tablets by mouth daily, Disp: , Rfl:      vitamin E 400 UNIT capsule, Take 800 Units by mouth daily, Disp: , Rfl:     Allergies:   Allergies   Allergen Reactions     Alphagan [Brimonidine Tartrate]      Azopt [Brinzolamide]      Beta Adrenergic Blockers      Bisphosphonates      Estrogens      Gemfibrozil      Trusopt [Dorzolamide] Itching     rash     Xalatan [Latanoprost]      Zetia [Ezetimibe]      Zocor [Hmg-Coa-R Inhibitors]        Social Hx: retired.  reports that she has never smoked. She has never used smokeless tobacco. She reports that she drinks alcohol. She reports that she does not use drugs.    Family Hx: family history includes Hypertension in her brother, sister, and sister.    REVIEW OF SYSTEMS:  10 point ROS neg other than the symptoms noted above in the HPI and PAST MEDICAL HISTORY. Notables,  CONSTITUTIONAL:NEGATIVE for fever, chills, change in weight  INTEGUMENTARY/SKIN: NEGATIVE for worrisome rashes, moles or lesions  MUSCULOSKELETAL:See HPI above  NEURO: NEGATIVE for weakness, dizziness or paresthesias    PHYSICAL EXAM:  BP (!) 180/98   Pulse 77   Ht 1.549 m (5' 1\")   Wt 60.7 kg (133 lb 12.8 oz)   BMI 25.28 kg/m      GENERAL APPEARANCE: elderly, alert, no distress; accompanied by her daughter.  SKIN: no suspicious lesions or rashes  NEURO: decreased  strength and tone, mentation intact and speech normal  PSYCH:  mentation appears normal and affect normal  RESPIRATORY: No increased work of breathing.  LYMPH: no palpable inguinal lymph nodes.    BILATERAL LOWER EXTREMITIES:  Gait: slight favors left, hunched.  No gross deformities or masses.  Bilateral Quad atrophy, strength weak  Intact sensation deep peroneal nerve, superficial peroneal nerve, med/lat tibial nerve, sural nerve, saphenous nerve  Intact EHL, EDL, TA, FHL, GS, quadriceps " hamstrings and hip flexors  Toes warm and well perfused, brisk capillary refill. Palpable 2+ dp pulses.  Bilateral calf soft and nttp or squeeze.  DTRs: achilles 2+, patella 2+.  Edema: 1+  Leg lengths: not tested      LEFT HIP EXAM:      Palpation: Tender:   left greater trochanter, left gluteus medius, left iliac crest, left SI joint and buttock, iliotibial band to the knee   Nontender: left groin, anterior thigh, posterior thigh.  Strength:  Grossly intact, weak.  Special tests:  Irritability (flexion/adduction/internal rotation) mild  Hip range of motion: Internal rotation: 15, External rotation: 35, Flexion 95, pain with extremes of internal rotation       RIGHT HIP EXAM:    Palpation: Tender:   Right buttock  Strength:  weak  Special tests:  Irritability (flexion/adduction/internal rotation) negative.  Hip range of motion: Internal rotation: 15, External rotation: 35, Flexion 95        X-RAY: AP pelvis and AP/Lateral views left hip from 9/9/2019 were reviewed in clinic today. No obvious fractures or dislocations. Severe  bilateral hip degenerative changes. Mild-to-moderate degenerative changes of the sacroiliac joints and  lower lumbar spine. Osteopenia. Atherosclerosis. Left femoral vascular stent.      Impression: 89yo female with acute left hip/buttock pain, trochanteric bursitis and gluteal and iliotibial band tendonitis. Notable bilateral primary hip osteoarthritis.    Plan:   * glad to hear that overall improved since onset.     * reviewed xrays with patient. Noted osteoarthritis of the hips, SI joints and low back.  * exam most consistent with soft tissue pain, tendonitis and bursitis.  * doesn't appear that the hip joints themselves are causing that much discomfort.    Discussed various treatment options, including:    * Activity modification - avoid impact activities or activities that aggravate symptoms.  * NSAIDS - regular use for inflammation ( twice daily or three times daily), with food, as long  as no contra-indications Please discuss with pcp if needed  * Stretching of iliotibial band.  * PT  for strengthening, stretching and range of motion exercises, especially stretching of iliotibial band. Modalities as indicated.  * Tylenol as needed for pain  * avoid laying on affected side.  * ice/ heat as needed.  * Injections: risks and perceived benefits of trochanteric bursal cortisone injections discussed. Patient elected not to proceed at this time as symptoms are much improving since onset.  * Return to clinic as needed.      Scar Maharaj M.D., M.S.  Dept. of Orthopaedic Surgery  [facility]          Again, thank you for allowing me to participate in the care of your patient.        Sincerely,        Scar Maharaj MD

## 2019-09-11 ENCOUNTER — TELEPHONE (OUTPATIENT)
Dept: FAMILY MEDICINE | Facility: CLINIC | Age: 84
End: 2019-09-11

## 2019-09-11 DIAGNOSIS — I10 BENIGN ESSENTIAL HYPERTENSION: ICD-10-CM

## 2019-09-11 DIAGNOSIS — I10 HYPERTENSION GOAL BP (BLOOD PRESSURE) < 150/90: ICD-10-CM

## 2019-09-11 RX ORDER — POTASSIUM CHLORIDE 750 MG/1
10 TABLET, EXTENDED RELEASE ORAL 2 TIMES DAILY
Qty: 60 TABLET | Refills: 0 | Status: SHIPPED | OUTPATIENT
Start: 2019-09-11 | End: 2019-09-27

## 2019-09-11 RX ORDER — LOSARTAN POTASSIUM 50 MG/1
TABLET ORAL
Qty: 60 TABLET | Refills: 0 | Status: SHIPPED | OUTPATIENT
Start: 2019-09-11 | End: 2019-09-27

## 2019-09-11 RX ORDER — AMLODIPINE BESYLATE 5 MG/1
5 TABLET ORAL DAILY
Qty: 30 TABLET | Refills: 0 | Status: SHIPPED | OUTPATIENT
Start: 2019-09-11 | End: 2019-09-27

## 2019-09-11 NOTE — TELEPHONE ENCOUNTER
Refilled per RN refill protocol.  Advised pt daughter refill was done and pt is due for fasting lab appointment and ov for further refills.  Keysha YEN, RN

## 2019-09-11 NOTE — TELEPHONE ENCOUNTER
Reason for Call:  Medication or medication refill:    Do you use a [facility]?  Name of the pharmacy and phone number for the current request:  [facility] [phone]    Name of the medication requested: potassium chloride (K-TAB,KLOR-CON) 10 MEQ tablet    amLODIPine (NORVASC) 5 MG tablet    losartan (COZAAR) 50 MG tablet    Other request:     Can we leave a detailed message on this number? YES    Phone number patient can be reached at: Other phone number:  [phone]    Best Time: anytime.     Call taken on 9/11/2019 at 2:24 PM by Thao Astorga

## 2019-09-12 ENCOUNTER — DOCUMENTATION ONLY (OUTPATIENT)
Dept: LAB | Facility: CLINIC | Age: 84
End: 2019-09-12

## 2019-09-12 DIAGNOSIS — E78.5 HYPERLIPIDEMIA LDL GOAL <130: ICD-10-CM

## 2019-09-12 DIAGNOSIS — E03.8 OTHER SPECIFIED HYPOTHYROIDISM: Primary | ICD-10-CM

## 2019-09-12 DIAGNOSIS — I10 HYPERTENSION GOAL BP (BLOOD PRESSURE) < 150/90: ICD-10-CM

## 2019-09-12 NOTE — PROGRESS NOTES
Pt has a PV-lab appointment on 9-20-19 at 1000 with no orders.  Please review pended orders and place any additional future orders if necessary.      Thank you,  Angelic Goode MLT  Lab

## 2019-09-20 DIAGNOSIS — E03.8 OTHER SPECIFIED HYPOTHYROIDISM: ICD-10-CM

## 2019-09-20 DIAGNOSIS — I10 HYPERTENSION GOAL BP (BLOOD PRESSURE) < 150/90: ICD-10-CM

## 2019-09-20 DIAGNOSIS — E78.5 HYPERLIPIDEMIA LDL GOAL <130: ICD-10-CM

## 2019-09-20 LAB
ALBUMIN SERPL-MCNC: 3.7 G/DL (ref 3.4–5)
ALP SERPL-CCNC: 72 U/L (ref 40–150)
ALT SERPL W P-5'-P-CCNC: 24 U/L (ref 0–50)
ANION GAP SERPL CALCULATED.3IONS-SCNC: 3 MMOL/L (ref 3–14)
AST SERPL W P-5'-P-CCNC: 28 U/L (ref 0–45)
BILIRUB SERPL-MCNC: 1.1 MG/DL (ref 0.2–1.3)
BUN SERPL-MCNC: 16 MG/DL (ref 7–30)
CALCIUM SERPL-MCNC: 9.1 MG/DL (ref 8.5–10.1)
CHLORIDE SERPL-SCNC: 106 MMOL/L (ref 94–109)
CHOLEST SERPL-MCNC: 147 MG/DL
CO2 SERPL-SCNC: 32 MMOL/L (ref 20–32)
CREAT SERPL-MCNC: 0.73 MG/DL (ref 0.52–1.04)
GFR SERPL CREATININE-BSD FRML MDRD: 71 ML/MIN/{1.73_M2}
GLUCOSE SERPL-MCNC: 91 MG/DL (ref 70–99)
HDLC SERPL-MCNC: 71 MG/DL
LDLC SERPL CALC-MCNC: 64 MG/DL
NONHDLC SERPL-MCNC: 76 MG/DL
POTASSIUM SERPL-SCNC: 4.4 MMOL/L (ref 3.4–5.3)
PROT SERPL-MCNC: 6.3 G/DL (ref 6.8–8.8)
SODIUM SERPL-SCNC: 141 MMOL/L (ref 133–144)
TRIGL SERPL-MCNC: 58 MG/DL
TSH SERPL DL<=0.005 MIU/L-ACNC: 3.25 MU/L (ref 0.4–4)

## 2019-09-20 PROCEDURE — 80061 LIPID PANEL: CPT | Performed by: NURSE PRACTITIONER

## 2019-09-20 PROCEDURE — 84443 ASSAY THYROID STIM HORMONE: CPT | Performed by: NURSE PRACTITIONER

## 2019-09-20 PROCEDURE — 80053 COMPREHEN METABOLIC PANEL: CPT | Performed by: NURSE PRACTITIONER

## 2019-09-20 PROCEDURE — 36415 COLL VENOUS BLD VENIPUNCTURE: CPT | Performed by: NURSE PRACTITIONER

## 2019-09-27 ENCOUNTER — OFFICE VISIT (OUTPATIENT)
Dept: FAMILY MEDICINE | Facility: CLINIC | Age: 84
End: 2019-09-27
Payer: COMMERCIAL

## 2019-09-27 VITALS
DIASTOLIC BLOOD PRESSURE: 70 MMHG | SYSTOLIC BLOOD PRESSURE: 156 MMHG | TEMPERATURE: 97 F | BODY MASS INDEX: 24.68 KG/M2 | HEART RATE: 56 BPM | WEIGHT: 130.6 LBS

## 2019-09-27 DIAGNOSIS — I48.91 ATRIAL FIBRILLATION, UNSPECIFIED TYPE (H): ICD-10-CM

## 2019-09-27 DIAGNOSIS — E78.5 HYPERLIPIDEMIA LDL GOAL <130: ICD-10-CM

## 2019-09-27 DIAGNOSIS — I10 HYPERTENSION GOAL BP (BLOOD PRESSURE) < 150/90: Primary | ICD-10-CM

## 2019-09-27 DIAGNOSIS — Z23 NEED FOR PROPHYLACTIC VACCINATION AND INOCULATION AGAINST INFLUENZA: ICD-10-CM

## 2019-09-27 PROCEDURE — 99214 OFFICE O/P EST MOD 30 MIN: CPT | Mod: 25 | Performed by: NURSE PRACTITIONER

## 2019-09-27 PROCEDURE — 90662 IIV NO PRSV INCREASED AG IM: CPT | Performed by: NURSE PRACTITIONER

## 2019-09-27 PROCEDURE — G0008 ADMIN INFLUENZA VIRUS VAC: HCPCS | Performed by: NURSE PRACTITIONER

## 2019-09-27 RX ORDER — ATORVASTATIN CALCIUM 40 MG/1
40 TABLET, FILM COATED ORAL DAILY
Qty: 90 TABLET | Refills: 3 | Status: SHIPPED | OUTPATIENT
Start: 2019-09-27 | End: 2020-08-25

## 2019-09-27 RX ORDER — POTASSIUM CHLORIDE 750 MG/1
10 TABLET, EXTENDED RELEASE ORAL 2 TIMES DAILY
Qty: 180 TABLET | Refills: 1 | Status: SHIPPED | OUTPATIENT
Start: 2019-09-27 | End: 2020-02-24

## 2019-09-27 RX ORDER — AMLODIPINE BESYLATE 5 MG/1
5 TABLET ORAL DAILY
Qty: 90 TABLET | Refills: 1 | Status: SHIPPED | OUTPATIENT
Start: 2019-09-27 | End: 2020-02-24

## 2019-09-27 RX ORDER — FUROSEMIDE 40 MG
40 TABLET ORAL EVERY MORNING
Qty: 90 TABLET | Refills: 1 | Status: SHIPPED | OUTPATIENT
Start: 2019-09-27 | End: 2020-02-24

## 2019-09-27 RX ORDER — LOSARTAN POTASSIUM 50 MG/1
50 TABLET ORAL DAILY
Qty: 90 TABLET | Refills: 1 | Status: SHIPPED | OUTPATIENT
Start: 2019-09-27 | End: 2020-02-24

## 2019-09-27 ASSESSMENT — ENCOUNTER SYMPTOMS
DIZZINESS: 0
MYALGIAS: 0
CHILLS: 0
COUGH: 0
PALPITATIONS: 0
ABDOMINAL PAIN: 0
SHORTNESS OF BREATH: 0
HEADACHES: 0
WEAKNESS: 0
NUMBNESS: 0
FEVER: 0

## 2020-01-06 ENCOUNTER — OFFICE VISIT (OUTPATIENT)
Dept: ORTHOPEDICS | Facility: CLINIC | Age: 85
End: 2020-01-06
Payer: COMMERCIAL

## 2020-01-06 ENCOUNTER — ANCILLARY PROCEDURE (OUTPATIENT)
Dept: GENERAL RADIOLOGY | Facility: CLINIC | Age: 85
End: 2020-01-06
Attending: ORTHOPAEDIC SURGERY
Payer: COMMERCIAL

## 2020-01-06 VITALS
HEART RATE: 76 BPM | SYSTOLIC BLOOD PRESSURE: 149 MMHG | BODY MASS INDEX: 25.96 KG/M2 | DIASTOLIC BLOOD PRESSURE: 77 MMHG | WEIGHT: 137.5 LBS | HEIGHT: 61 IN

## 2020-01-06 DIAGNOSIS — M25.511 ACUTE PAIN OF RIGHT SHOULDER: ICD-10-CM

## 2020-01-06 DIAGNOSIS — M25.511 ACUTE PAIN OF RIGHT SHOULDER: Primary | ICD-10-CM

## 2020-01-06 PROCEDURE — 99213 OFFICE O/P EST LOW 20 MIN: CPT | Mod: 25 | Performed by: ORTHOPAEDIC SURGERY

## 2020-01-06 PROCEDURE — 73030 X-RAY EXAM OF SHOULDER: CPT | Mod: RT

## 2020-01-06 PROCEDURE — 20610 DRAIN/INJ JOINT/BURSA W/O US: CPT | Mod: RT | Performed by: ORTHOPAEDIC SURGERY

## 2020-01-06 RX ORDER — TRIAMCINOLONE ACETONIDE 40 MG/ML
80 INJECTION, SUSPENSION INTRA-ARTICULAR; INTRAMUSCULAR
Status: DISCONTINUED | OUTPATIENT
Start: 2020-01-06 | End: 2021-01-28

## 2020-01-06 RX ORDER — LIDOCAINE HYDROCHLORIDE 10 MG/ML
4 INJECTION, SOLUTION INFILTRATION; PERINEURAL
Status: DISCONTINUED | OUTPATIENT
Start: 2020-01-06 | End: 2021-01-28

## 2020-01-06 RX ORDER — BUPIVACAINE HYDROCHLORIDE 2.5 MG/ML
3 INJECTION, SOLUTION INFILTRATION; PERINEURAL
Status: DISCONTINUED | OUTPATIENT
Start: 2020-01-06 | End: 2021-01-28

## 2020-01-06 RX ADMIN — TRIAMCINOLONE ACETONIDE 80 MG: 40 INJECTION, SUSPENSION INTRA-ARTICULAR; INTRAMUSCULAR at 11:57

## 2020-01-06 RX ADMIN — BUPIVACAINE HYDROCHLORIDE 3 ML: 2.5 INJECTION, SOLUTION INFILTRATION; PERINEURAL at 11:57

## 2020-01-06 RX ADMIN — LIDOCAINE HYDROCHLORIDE 4 ML: 10 INJECTION, SOLUTION INFILTRATION; PERINEURAL at 11:57

## 2020-01-06 ASSESSMENT — MIFFLIN-ST. JEOR: SCORE: 966.08

## 2020-01-06 ASSESSMENT — PAIN SCALES - GENERAL: PAINLEVEL: WORST PAIN (10)

## 2020-01-06 NOTE — PROGRESS NOTES
"CHIEF COMPLAINT:   Chief Complaint   Patient presents with     Right Shoulder - Pain     DOI: 1/6/20. Patient notes she woke up about 3 am this morning and had severe pain in her right shoulder, in the joint. She could hardly move her arm, it was terribly painful. It is a little better now.    .    HISTORY:  Halle Saucedo is a 89 year old female, right  -hand dominant, who is seen as self-referral for right shoulder pain that started this morning without injury. Woke up about 03:00 this morning with severe pain right shoulder, \"in the joint\", and down the arm. She could hardly move her arm. Since then, the pain has been improving and is a little better now. She's able to lift her arm. Hasn't done anything for the pain, not taken any over the counter medications. She wanted to take advil but is on blood thinners and knows she's not supposed to take advil. Denies numbness and tingling. Denies prior shoulder problems or pain.      Other PMH:  has a past medical history of Glaucoma, Other and unspecified hyperlipidemia, and Unspecified essential hypertension.  Patient Active Problem List   Diagnosis     HYPERLIPIDEMIA LDL GOAL <130     Advanced directives, counseling/discussion     Glaucoma associated with anomalies of iris     PVD (peripheral vascular disease) (H)     Hard of hearing     Gout     Headache     GERD (gastroesophageal reflux disease)     Hypertension goal BP (blood pressure) < 150/90     Melanoma of skin (H)     Hypothyroidism     Other specified hypothyroidism     Seasonal allergic rhinitis, unspecified allergic rhinitis trigger     ERRONEOUS ENCOUNTER--DISREGARD     Stenosis of carotid artery, unspecified laterality     Atrial fibrillation, unspecified type (H)     ST elevation myocardial infarction (STEMI), unspecified artery (H)     Coronary artery disease involving native coronary artery of native heart with unstable angina pectoris (H)       Surgical Hx:  has a past surgical history that includes " cataract iol, rt/lt; tonsillectomy & adenoidectomy; and d & c.    Medications:   Current Outpatient Medications:      amLODIPine (NORVASC) 5 MG tablet, Take 1 tablet (5 mg) by mouth daily for blood pressure, Disp: 90 tablet, Rfl: 1     apixaban ANTICOAGULANT (ELIQUIS) 2.5 MG tablet, Take by mouth 2 times daily, Disp: , Rfl:      aspirin 81 MG tablet, Take 81 mg by mouth daily, Disp: , Rfl:      atorvastatin (LIPITOR) 40 MG tablet, Take 1 tablet (40 mg) by mouth daily For cholesterol, Disp: 90 tablet, Rfl: 3     furosemide (LASIX) 40 MG tablet, Take 1 tablet (40 mg) by mouth every morning, Disp: 90 tablet, Rfl: 1     losartan (COZAAR) 50 MG tablet, Take 1 tablet (50 mg) by mouth daily for blood pressure, Disp: 90 tablet, Rfl: 1     LUMIGAN 0.01 % SOLN, Place 1 drop into both eyes At Bedtime , Disp: , Rfl:      metoprolol (TOPROL-XL) 25 MG 24 hr tablet, Take 25 mg by mouth daily, Disp: , Rfl:      Multiple Vitamins-Minerals (MULTIVITAMIN ADULTS 50+) TABS, Take 1 tablet by mouth daily, Disp: , Rfl:      netarsudil dimesylate (RHOPRESSA) 0.02 % SOLN, At Bedtime, Disp: , Rfl:      nitroGLYcerin (NITROSTAT) 0.4 MG sublingual tablet, For chest pain place 1 tablet under the tongue every 5 minutes for 3 doses. If symptoms persist 5 minutes after 1st dose call 911., Disp: , Rfl:      omeprazole (PRILOSEC) 20 MG CR capsule, Take 1 capsule (20 mg) by mouth daily x10 days then as needed for heartburn/upset stomach, Disp: 30 capsule, Rfl: 1     potassium chloride ER (K-TAB/KLOR-CON) 10 MEQ CR tablet, Take 1 tablet (10 mEq) by mouth 2 times daily, Disp: 180 tablet, Rfl: 1     VITAMIN D, CHOLECALCIFEROL, PO, Take 2 tablets by mouth daily, Disp: , Rfl:      vitamin E 400 UNIT capsule, Take 800 Units by mouth daily, Disp: , Rfl:     Allergies:   Allergies   Allergen Reactions     Alphagan [Brimonidine Tartrate]      Azopt [Brinzolamide]      Beta Adrenergic Blockers      Bisphosphonates      Estrogens      Gemfibrozil      Trusopt  "[Dorzolamide] Itching     rash     Xalatan [Latanoprost]      Zetia [Ezetimibe]      Zocor [Hmg-Coa-R Inhibitors]        Social Hx: retired.  reports that she has never smoked. She has never used smokeless tobacco. She reports current alcohol use. She reports that she does not use drugs.    Family Hx: family history includes Hypertension in her brother, sister, and sister..    REVIEW OF SYSTEMS: 10 point ROS neg other than the symptoms noted above in the HPI and PMH. Notables include  CONSTITUTIONAL:NEGATIVE for fever, chills, change in weight  INTEGUMENTARY/SKIN: NEGATIVE for worrisome rashes, moles or lesions  MUSCULOSKELETAL:See HPI above  NEURO: NEGATIVE for weakness, dizziness or paresthesias    PHYSICAL EXAM:  BP (!) 149/77   Pulse 76   Ht 1.549 m (5' 1\")   Wt 62.4 kg (137 lb 8 oz)   BMI 25.98 kg/m     GENERAL APPEARANCE: elderly, alert, no distress; accompanied by her daughter.  SKIN: no suspicious lesions or rashes  NEURO: decreased  strength and tone, mentation intact and speech normal  PSYCH:  mentation appears normal and affect normal, not anxious  RESPIRATORY: No increased work of breathing.  VASCULAR: Radial pulses 2+ and brisk cappillary refill   LYMPH: no palpable axillary lymphadenopathy or cervical neck lymphadenopathy.      MUSCULOSKELETAL:      RIGHT UPPER EXTREMITY:  Sensation intact to light touch in median, radial, ulnar and axillary nerve distributions  Palpable 2+ radial pulse, brisk capillary refill to all fingers, wwp  Intact epl fpl fdp edc wrist flexion/extension biceps triceps deltoid    RIGHT SHOULDER:  Shoulder Inspection: no swelling, bruising, discoloration, or obvious deformity or asymmetry  Tender: acromion, anterior capsule, proximal bicep tendon, greater tuberosity, proximal humerus and deltoid  Non-tender: supraspinatus , infraspinatus, upper trapezius muscle and rhomboids  Range of Motion:   Active:forward flexion 90 degrees, external rotation  45 degrees   Passively " increased range of motion. Minimal discomfort from 0-90 degrees flexion or on external rotation. Pain at extremes.  Strength: forward flexion 4/5, limited by pain, External rotation 4/5, limited by pain   Impingement: all grade 2 positive  Special tests: Empty Can: Positive        X-RAY INTERPRETATION: 3 views right  shoulder obtained 1/6/2020 were reviewed personally in clinic today with the patient. On my review, No obvious fracture or dislocation. No obvious bony abnormality or lesion. Advanced loss of gleno-humeral articular space. Faint subacromial calcifications possibly within the supraspinatus.    ASSESSMENT: Halle Saucedo is a 89 year old female, right  -hand dominant with acute right shoulder pain, likely calcific tendonitis.      PLAN:   * images reviewed. Notable osteoarthritis.  * suspect acute pain likely bursitis/tendonitis.  * rest, activity modification ice/heat, over the counter acetaminophen  * discussed subacromial cortisone injection, patient elects to proceed. See procedure note below  * return to clinic as needed.    Halle to follow up with Primary Care provider regarding elevated blood pressure.    Scar Maharaj M.D., M.S.  Dept. of Orthopaedic Surgery  [facility]    Large Joint Injection/Arthocentesis: R subacromial bursa  Date/Time: 1/6/2020 11:57 AM  Performed by: Reginald Neves PA  Authorized by: Scar Maharaj MD     Indications:  Pain  Indications comment:  Impingement  Needle Size:  22 G  Guidance: landmark guided    Approach:  Posterolateral  Location:  Shoulder      Site:  R subacromial bursa  Medications:  3 mL bupivacaine 0.25 %; 4 mL lidocaine 1 %; 80 mg triamcinolone 40 MG/ML  Outcome:  Tolerated well, no immediate complications  Procedure discussed: discussed risks, benefits, and alternatives    Consent Given by:  Patient  Prep: patient was prepped and draped in usual sterile fashion

## 2020-01-06 NOTE — LETTER
"    1/6/2020         RE: Halle Saucedo  [id number] [id number]        Dear Colleague,    Thank you for referring your patient, Halle Saucedo, to the [facility] [place]. Please see a copy of my visit note below.    CHIEF COMPLAINT:   Chief Complaint   Patient presents with     Right Shoulder - Pain     DOI: 1/6/20. Patient notes she woke up about 3 am this morning and had severe pain in her right shoulder, in the joint. She could hardly move her arm, it was terribly painful. It is a little better now.    .    HISTORY:  Halle Saucedo is a 89 year old female, right  -hand dominant, who is seen as self-referral for right shoulder pain that started this morning without injury. Woke up about 03:00 this morning with severe pain right shoulder, \"in the joint\", and down the arm. She could hardly move her arm. Since then, the pain has been improving and is a little better now. She's able to lift her arm. Hasn't done anything for the pain, not taken any over the counter medications. She wanted to take advil but is on blood thinners and knows she's not supposed to take advil. Denies numbness and tingling. Denies prior shoulder problems or pain.      Other PMH:  has a past medical history of Glaucoma, Other and unspecified hyperlipidemia, and Unspecified essential hypertension.  Patient Active Problem List   Diagnosis     HYPERLIPIDEMIA LDL GOAL <130     Advanced directives, counseling/discussion     Glaucoma associated with anomalies of iris     PVD (peripheral vascular disease) (H)     Hard of hearing     Gout     Headache     GERD (gastroesophageal reflux disease)     Hypertension goal BP (blood pressure) < 150/90     Melanoma of skin (H)     Hypothyroidism     Other specified hypothyroidism     Seasonal allergic rhinitis, unspecified allergic rhinitis trigger     ERRONEOUS ENCOUNTER--DISREGARD     Stenosis of carotid artery, unspecified laterality     Atrial fibrillation, unspecified " type (H)     ST elevation myocardial infarction (STEMI), unspecified artery (H)     Coronary artery disease involving native coronary artery of native heart with unstable angina pectoris (H)       Surgical Hx:  has a past surgical history that includes cataract iol, rt/lt; tonsillectomy & adenoidectomy; and d & c.    Medications:   Current Outpatient Medications:      amLODIPine (NORVASC) 5 MG tablet, Take 1 tablet (5 mg) by mouth daily for blood pressure, Disp: 90 tablet, Rfl: 1     apixaban ANTICOAGULANT (ELIQUIS) 2.5 MG tablet, Take by mouth 2 times daily, Disp: , Rfl:      aspirin 81 MG tablet, Take 81 mg by mouth daily, Disp: , Rfl:      atorvastatin (LIPITOR) 40 MG tablet, Take 1 tablet (40 mg) by mouth daily For cholesterol, Disp: 90 tablet, Rfl: 3     furosemide (LASIX) 40 MG tablet, Take 1 tablet (40 mg) by mouth every morning, Disp: 90 tablet, Rfl: 1     losartan (COZAAR) 50 MG tablet, Take 1 tablet (50 mg) by mouth daily for blood pressure, Disp: 90 tablet, Rfl: 1     LUMIGAN 0.01 % SOLN, Place 1 drop into both eyes At Bedtime , Disp: , Rfl:      metoprolol (TOPROL-XL) 25 MG 24 hr tablet, Take 25 mg by mouth daily, Disp: , Rfl:      Multiple Vitamins-Minerals (MULTIVITAMIN ADULTS 50+) TABS, Take 1 tablet by mouth daily, Disp: , Rfl:      netarsudil dimesylate (RHOPRESSA) 0.02 % SOLN, At Bedtime, Disp: , Rfl:      nitroGLYcerin (NITROSTAT) 0.4 MG sublingual tablet, For chest pain place 1 tablet under the tongue every 5 minutes for 3 doses. If symptoms persist 5 minutes after 1st dose call 911., Disp: , Rfl:      omeprazole (PRILOSEC) 20 MG CR capsule, Take 1 capsule (20 mg) by mouth daily x10 days then as needed for heartburn/upset stomach, Disp: 30 capsule, Rfl: 1     potassium chloride ER (K-TAB/KLOR-CON) 10 MEQ CR tablet, Take 1 tablet (10 mEq) by mouth 2 times daily, Disp: 180 tablet, Rfl: 1     VITAMIN D, CHOLECALCIFEROL, PO, Take 2 tablets by mouth daily, Disp: , Rfl:      vitamin E 400 UNIT capsule,  "Take 800 Units by mouth daily, Disp: , Rfl:     Allergies:   Allergies   Allergen Reactions     Alphagan [Brimonidine Tartrate]      Azopt [Brinzolamide]      Beta Adrenergic Blockers      Bisphosphonates      Estrogens      Gemfibrozil      Trusopt [Dorzolamide] Itching     rash     Xalatan [Latanoprost]      Zetia [Ezetimibe]      Zocor [Hmg-Coa-R Inhibitors]        Social Hx: retired.  reports that she has never smoked. She has never used smokeless tobacco. She reports current alcohol use. She reports that she does not use drugs.    Family Hx: family history includes Hypertension in her brother, sister, and sister..    REVIEW OF SYSTEMS: 10 point ROS neg other than the symptoms noted above in the HPI and PMH. Notables include  CONSTITUTIONAL:NEGATIVE for fever, chills, change in weight  INTEGUMENTARY/SKIN: NEGATIVE for worrisome rashes, moles or lesions  MUSCULOSKELETAL:See HPI above  NEURO: NEGATIVE for weakness, dizziness or paresthesias    PHYSICAL EXAM:  BP (!) 149/77   Pulse 76   Ht 1.549 m (5' 1\")   Wt 62.4 kg (137 lb 8 oz)   BMI 25.98 kg/m      GENERAL APPEARANCE: elderly, alert, no distress; accompanied by her daughter.  SKIN: no suspicious lesions or rashes  NEURO: decreased  strength and tone, mentation intact and speech normal  PSYCH:  mentation appears normal and affect normal, not anxious  RESPIRATORY: No increased work of breathing.  VASCULAR: Radial pulses 2+ and brisk cappillary refill   LYMPH: no palpable axillary lymphadenopathy or cervical neck lymphadenopathy.      MUSCULOSKELETAL:      RIGHT UPPER EXTREMITY:  Sensation intact to light touch in median, radial, ulnar and axillary nerve distributions  Palpable 2+ radial pulse, brisk capillary refill to all fingers, wwp  Intact epl fpl fdp edc wrist flexion/extension biceps triceps deltoid    RIGHT SHOULDER:  Shoulder Inspection: no swelling, bruising, discoloration, or obvious deformity or asymmetry  Tender: acromion, anterior capsule, " proximal bicep tendon, greater tuberosity, proximal humerus and deltoid  Non-tender: supraspinatus , infraspinatus, upper trapezius muscle and rhomboids  Range of Motion:   Active:forward flexion 90 degrees, external rotation  45 degrees   Passively increased range of motion. Minimal discomfort from 0-90 degrees flexion or on external rotation. Pain at extremes.  Strength: forward flexion 4/5, limited by pain, External rotation 4/5, limited by pain   Impingement: all grade 2 positive  Special tests: Empty Can: Positive        X-RAY INTERPRETATION: 3 views right  shoulder obtained 1/6/2020 were reviewed personally in clinic today with the patient. On my review, No obvious fracture or dislocation. No obvious bony abnormality or lesion. Advanced loss of gleno-humeral articular space. Faint subacromial calcifications possibly within the supraspinatus.    ASSESSMENT: Halle Saucedo is a 89 year old female, right  -hand dominant with acute right shoulder pain, likely calcific tendonitis.      PLAN:   * images reviewed. Notable osteoarthritis.  * suspect acute pain likely bursitis/tendonitis.  * rest, activity modification ice/heat, over the counter acetaminophen  * discussed subacromial cortisone injection, patient elects to proceed. See procedure note below  * return to clinic as needed.    Halle to follow up with Primary Care provider regarding elevated blood pressure.    Scar Maharaj M.D., M.S.  Dept. of Orthopaedic Surgery  [facility]    Large Joint Injection/Arthocentesis: R subacromial bursa  Date/Time: 1/6/2020 11:57 AM  Performed by: Reginald Neves PA  Authorized by: Scar Maharaj MD     Indications:  Pain  Indications comment:  Impingement  Needle Size:  22 G  Guidance: landmark guided    Approach:  Posterolateral  Location:  Shoulder      Site:  R subacromial bursa  Medications:  3 mL bupivacaine 0.25 %; 4 mL lidocaine 1 %; 80 mg triamcinolone 40 MG/ML  Outcome:  Tolerated well, no  immediate complications  Procedure discussed: discussed risks, benefits, and alternatives    Consent Given by:  Patient  Prep: patient was prepped and draped in usual sterile fashion            Again, thank you for allowing me to participate in the care of your patient.        Sincerely,        Scar Maharaj MD

## 2020-01-07 ENCOUNTER — TELEPHONE (OUTPATIENT)
Dept: FAMILY MEDICINE | Facility: CLINIC | Age: 85
End: 2020-01-07

## 2020-01-07 NOTE — TELEPHONE ENCOUNTER
Please advise if this is something you can do, or does she need an office visit with you?Mere Rodrigez MA/RUSLAN

## 2020-01-07 NOTE — TELEPHONE ENCOUNTER
Daughter states Mom's handicapped tag expires next month. Does she need to be seen to renew that tag? Ok to leave a detailed message.

## 2020-01-07 NOTE — TELEPHONE ENCOUNTER
If we can get a copy of last one that would help. Otherwise would need to be seen since I've not seen on one year. Santino Gordon MD

## 2020-01-08 NOTE — TELEPHONE ENCOUNTER
I put a copy of the Application for Disability Parking Certificate from last year and a new one in your basket to complete if you agree with it.Mere Rodrigez MA/RUSLAN

## 2020-01-09 NOTE — TELEPHONE ENCOUNTER
Looks like patient still needs an md appointment in the next 1-2months for medications/labs recheck but form done. Santino Gordon MD

## 2020-02-18 ENCOUNTER — DOCUMENTATION ONLY (OUTPATIENT)
Dept: LAB | Facility: CLINIC | Age: 85
End: 2020-02-18

## 2020-02-18 DIAGNOSIS — I10 HYPERTENSION GOAL BP (BLOOD PRESSURE) < 150/90: Primary | ICD-10-CM

## 2020-02-20 DIAGNOSIS — I10 HYPERTENSION GOAL BP (BLOOD PRESSURE) < 150/90: ICD-10-CM

## 2020-02-20 LAB
ALBUMIN SERPL-MCNC: 3.7 G/DL (ref 3.4–5)
ANION GAP SERPL CALCULATED.3IONS-SCNC: 4 MMOL/L (ref 3–14)
BUN SERPL-MCNC: 15 MG/DL (ref 7–30)
CALCIUM SERPL-MCNC: 9.2 MG/DL (ref 8.5–10.1)
CHLORIDE SERPL-SCNC: 103 MMOL/L (ref 94–109)
CO2 SERPL-SCNC: 32 MMOL/L (ref 20–32)
CREAT SERPL-MCNC: 0.74 MG/DL (ref 0.52–1.04)
GFR SERPL CREATININE-BSD FRML MDRD: 70 ML/MIN/{1.73_M2}
GLUCOSE SERPL-MCNC: 90 MG/DL (ref 70–99)
PHOSPHATE SERPL-MCNC: 3.1 MG/DL (ref 2.5–4.5)
POTASSIUM SERPL-SCNC: 3.8 MMOL/L (ref 3.4–5.3)
SODIUM SERPL-SCNC: 139 MMOL/L (ref 133–144)

## 2020-02-20 PROCEDURE — 80069 RENAL FUNCTION PANEL: CPT | Performed by: FAMILY MEDICINE

## 2020-02-20 PROCEDURE — 36415 COLL VENOUS BLD VENIPUNCTURE: CPT | Performed by: FAMILY MEDICINE

## 2020-02-24 ENCOUNTER — OFFICE VISIT (OUTPATIENT)
Dept: FAMILY MEDICINE | Facility: CLINIC | Age: 85
End: 2020-02-24
Payer: COMMERCIAL

## 2020-02-24 VITALS
SYSTOLIC BLOOD PRESSURE: 114 MMHG | BODY MASS INDEX: 24.68 KG/M2 | HEART RATE: 76 BPM | WEIGHT: 130.6 LBS | DIASTOLIC BLOOD PRESSURE: 64 MMHG | TEMPERATURE: 97.9 F

## 2020-02-24 DIAGNOSIS — E78.5 HYPERLIPIDEMIA LDL GOAL <130: Primary | ICD-10-CM

## 2020-02-24 DIAGNOSIS — E03.8 OTHER SPECIFIED HYPOTHYROIDISM: ICD-10-CM

## 2020-02-24 DIAGNOSIS — I10 HYPERTENSION GOAL BP (BLOOD PRESSURE) < 150/90: ICD-10-CM

## 2020-02-24 PROCEDURE — 99214 OFFICE O/P EST MOD 30 MIN: CPT | Performed by: FAMILY MEDICINE

## 2020-02-24 RX ORDER — AMLODIPINE BESYLATE 5 MG/1
5 TABLET ORAL DAILY
Qty: 90 TABLET | Refills: 1 | Status: SHIPPED | OUTPATIENT
Start: 2020-02-24 | End: 2020-08-25

## 2020-02-24 RX ORDER — LOSARTAN POTASSIUM 50 MG/1
50 TABLET ORAL DAILY
Qty: 90 TABLET | Refills: 1 | Status: SHIPPED | OUTPATIENT
Start: 2020-02-24 | End: 2020-08-25

## 2020-02-24 RX ORDER — FUROSEMIDE 40 MG
40 TABLET ORAL EVERY MORNING
Qty: 90 TABLET | Refills: 1 | Status: SHIPPED | OUTPATIENT
Start: 2020-02-24 | End: 2020-08-25

## 2020-02-24 RX ORDER — POTASSIUM CHLORIDE 750 MG/1
10 TABLET, EXTENDED RELEASE ORAL 2 TIMES DAILY
Qty: 180 TABLET | Refills: 1 | Status: SHIPPED | OUTPATIENT
Start: 2020-02-24 | End: 2020-10-14

## 2020-02-24 NOTE — PROGRESS NOTES
SUBJECTIVE:  Halle Saucedo, a 89 year old female scheduled an appointment to discuss the following issues:  Follow-up htn, hypothyroid and high cholesterol. Seeing cardiology for a.fib and history angioplasty s/p MI.   Seeing cardiology in 2 months.   Outside blood pressure readings ok average 125/75 - same time 9am.   No chest pain or shortness of breath. Taking vitaminD supplement.   Appetite ok. Emotionally doing ok. No falls. Memory overall ok.   No urine changes or hematuria. No black/bloody stools. No abdominal pain.     Medical, social, surgical, and family histories reviewed.    ROS:  All other ROS negative    OBJECTIVE:  /64   Pulse 76   Temp 97.9  F (36.6  C) (Oral)   Wt 59.2 kg (130 lb 9.6 oz)   BMI 24.68 kg/m    EXAM:  GENERAL APPEARANCE: healthy, alert and no distress  NECK: no adenopathy, no asymmetry, masses, or scars and thyroid normal to palpation  RESP: lungs clear to auscultation - no rales, rhonchi or wheezes  CV: regular rates and rhythm, normal S1 S2, no S3 or S4 and no murmur, click or rub -  ABDOMEN:  soft, nontender, no HSM or masses and bowel sounds normal  MS: extremities normal- no gross deformities noted, no evidence of inflammation in joints, FROM in all extremities.  NEURO: Normal strength and tone, sensory exam grossly normal, mentation intact and speech normal  PSYCH: mentation    ASSESSMENT / PLAN:  (E78.5) Hyperlipidemia LDL goal <130  (primary encounter diagnosis)  Comment: stable  Plan: Lipid panel reflex to direct LDL Fasting,         Comprehensive metabolic panel        Continue statin. Recheck in 6 months      (I10) Hypertension goal BP (blood pressure) < 150/90  Comment: stable  Plan: Comprehensive metabolic panel, amLODIPine         (NORVASC) 5 MG tablet, furosemide (LASIX) 40 MG        tablet, losartan (COZAAR) 50 MG tablet,         potassium chloride ER (K-TAB/KLOR-CON) 10 MEQ         CR tablet        Continue self-monitor but check different times/day and write  down for next visit. Recheck in 6 months  Sooner if worse. Double cozaar if worse. Continue lower sodium diet. Chest pain or shortness of breath to er. Seeing cardiology tool    (E03.8) Other specified hypothyroidism  Comment: stable  Plan: TSH with free T4 reflex        Recheck in 6 months      Santino Gordon MD

## 2020-04-06 ENCOUNTER — TELEPHONE (OUTPATIENT)
Dept: FAMILY MEDICINE | Facility: CLINIC | Age: 85
End: 2020-04-06

## 2020-04-06 DIAGNOSIS — K21.00 GASTROESOPHAGEAL REFLUX DISEASE WITH ESOPHAGITIS: ICD-10-CM

## 2020-04-06 NOTE — TELEPHONE ENCOUNTER
Refilled per RN refill protocol.  Left message on Mere trevino that refill she requested for pt was sent to the pharmacy.  Keysha YEN, RN

## 2020-04-06 NOTE — TELEPHONE ENCOUNTER
Reason for Call:  Medication or medication refill:    Do you use a [facility]?  Name of the pharmacy and phone number for the current request:  [facility] [phone]    Name of the medication requested: omeprazole 20 mg    Other request: na    Can we leave a detailed message on this number? YES    Phone number patient can be reached at: Home number on file [phone] (home)    Best Time: any      Call taken on 4/6/2020 at 10:50 AM by Fabiola Cunningham

## 2020-04-06 NOTE — TELEPHONE ENCOUNTER
Prescription approved per [facility] Refill Protocol.  Sheryl Peterson RN

## 2020-05-15 ENCOUNTER — APPOINTMENT (OUTPATIENT)
Age: 85
Setting detail: DERMATOLOGY
End: 2020-05-15

## 2020-05-15 VITALS — RESPIRATION RATE: 16 BRPM | HEIGHT: 61 IN | WEIGHT: 130 LBS

## 2020-05-15 DIAGNOSIS — H01.13 ECZEMATOUS DERMATITIS OF EYELID: ICD-10-CM

## 2020-05-15 DIAGNOSIS — L20.89 OTHER ATOPIC DERMATITIS: ICD-10-CM

## 2020-05-15 PROBLEM — H01.139 ECZEMATOUS DERMATITIS OF UNSPECIFIED EYE, UNSPECIFIED EYELID: Status: ACTIVE | Noted: 2020-05-15

## 2020-05-15 PROCEDURE — 99214 OFFICE O/P EST MOD 30 MIN: CPT

## 2020-05-15 PROCEDURE — OTHER COUNSELING: OTHER

## 2020-05-15 PROCEDURE — OTHER PRESCRIPTION: OTHER

## 2020-05-15 RX ORDER — HYDROCORTISONE 1 %
1% OINTMENT (GRAM) TOPICAL BID
Qty: 1 | Refills: 0 | Status: ERX | COMMUNITY
Start: 2020-05-15

## 2020-05-15 RX ORDER — CLOBETASOL PROPIONATE 0.5 MG/G
0.05% OINTMENT TOPICAL BID PRN
Qty: 1 | Refills: 2 | Status: ERX | COMMUNITY
Start: 2020-05-15

## 2020-05-15 ASSESSMENT — LOCATION ZONE DERM
LOCATION ZONE: FACE
LOCATION ZONE: LEG
LOCATION ZONE: ARM

## 2020-05-15 ASSESSMENT — LOCATION DETAILED DESCRIPTION DERM
LOCATION DETAILED: LEFT PROXIMAL POSTERIOR UPPER ARM
LOCATION DETAILED: LEFT DISTAL CALF
LOCATION DETAILED: RIGHT SUPERIOR MEDIAL MALAR CHEEK

## 2020-05-15 ASSESSMENT — LOCATION SIMPLE DESCRIPTION DERM
LOCATION SIMPLE: RIGHT CHEEK
LOCATION SIMPLE: LEFT CALF
LOCATION SIMPLE: LEFT POSTERIOR UPPER ARM

## 2020-05-15 NOTE — HPI: RASH
How Severe Is Your Rash?: mild
Is This A New Presentation, Or A Follow-Up?: Rash
Additional History: She gets itchy on this area she thinks it may be from her eye drop, but uncertain.
How Severe Is Your Rash?: moderate
Additional History: Resolves with clobetasol on left leg bid for 7 days then resolves after about 2 weeks. Has had issue for years. Arm rash is new.

## 2020-05-15 NOTE — PROCEDURE: COUNSELING
Detail Level: Zone
Detail Level: Detailed
Patient Specific Counseling (Will Not Stick From Patient To Patient): Discussed eyelid dermatitis and potential types of dermatitis. Recommended over-the-counter hydrocortisone 1% ointment. Will send a prescription so she gets the correct one. Discussed conservative use (as patient has glaucoma). Return to clinic if not resolved within 2 weeks\\n Expect it to flare perioitcally. Patient expressed understanding.
Patient Specific Counseling (Will Not Stick From Patient To Patient): Discussed no rash on arm today, should itch return then can try clobetasol ointment, continue clobetasol ointment as needed for flares on legs. Patient expressed understanding.

## 2020-06-22 ENCOUNTER — TRANSFERRED RECORDS (OUTPATIENT)
Dept: HEALTH INFORMATION MANAGEMENT | Facility: CLINIC | Age: 85
End: 2020-06-22

## 2020-08-20 DIAGNOSIS — E78.5 HYPERLIPIDEMIA LDL GOAL <130: ICD-10-CM

## 2020-08-20 DIAGNOSIS — I10 HYPERTENSION GOAL BP (BLOOD PRESSURE) < 150/90: ICD-10-CM

## 2020-08-20 DIAGNOSIS — E03.8 OTHER SPECIFIED HYPOTHYROIDISM: ICD-10-CM

## 2020-08-20 LAB
ALBUMIN SERPL-MCNC: 3.5 G/DL (ref 3.4–5)
ALP SERPL-CCNC: 66 U/L (ref 40–150)
ALT SERPL W P-5'-P-CCNC: 25 U/L (ref 0–50)
ANION GAP SERPL CALCULATED.3IONS-SCNC: 6 MMOL/L (ref 3–14)
AST SERPL W P-5'-P-CCNC: 22 U/L (ref 0–45)
BILIRUB SERPL-MCNC: 1 MG/DL (ref 0.2–1.3)
BUN SERPL-MCNC: 15 MG/DL (ref 7–30)
CALCIUM SERPL-MCNC: 8.8 MG/DL (ref 8.5–10.1)
CHLORIDE SERPL-SCNC: 106 MMOL/L (ref 94–109)
CHOLEST SERPL-MCNC: 153 MG/DL
CO2 SERPL-SCNC: 29 MMOL/L (ref 20–32)
CREAT SERPL-MCNC: 0.76 MG/DL (ref 0.52–1.04)
GFR SERPL CREATININE-BSD FRML MDRD: 67 ML/MIN/{1.73_M2}
GLUCOSE SERPL-MCNC: 85 MG/DL (ref 70–99)
HDLC SERPL-MCNC: 74 MG/DL
LDLC SERPL CALC-MCNC: 64 MG/DL
NONHDLC SERPL-MCNC: 79 MG/DL
POTASSIUM SERPL-SCNC: 3.7 MMOL/L (ref 3.4–5.3)
PROT SERPL-MCNC: 6.4 G/DL (ref 6.8–8.8)
SODIUM SERPL-SCNC: 141 MMOL/L (ref 133–144)
TRIGL SERPL-MCNC: 73 MG/DL
TSH SERPL DL<=0.005 MIU/L-ACNC: 3.62 MU/L (ref 0.4–4)

## 2020-08-20 PROCEDURE — 80053 COMPREHEN METABOLIC PANEL: CPT | Performed by: FAMILY MEDICINE

## 2020-08-20 PROCEDURE — 36415 COLL VENOUS BLD VENIPUNCTURE: CPT | Performed by: FAMILY MEDICINE

## 2020-08-20 PROCEDURE — 84443 ASSAY THYROID STIM HORMONE: CPT | Performed by: FAMILY MEDICINE

## 2020-08-20 PROCEDURE — 80061 LIPID PANEL: CPT | Performed by: FAMILY MEDICINE

## 2020-08-25 ENCOUNTER — OFFICE VISIT (OUTPATIENT)
Dept: FAMILY MEDICINE | Facility: CLINIC | Age: 85
End: 2020-08-25
Payer: COMMERCIAL

## 2020-08-25 VITALS
OXYGEN SATURATION: 97 % | WEIGHT: 130 LBS | BODY MASS INDEX: 24.56 KG/M2 | HEART RATE: 63 BPM | SYSTOLIC BLOOD PRESSURE: 138 MMHG | DIASTOLIC BLOOD PRESSURE: 64 MMHG | TEMPERATURE: 98.2 F

## 2020-08-25 DIAGNOSIS — I10 HYPERTENSION GOAL BP (BLOOD PRESSURE) < 150/90: ICD-10-CM

## 2020-08-25 DIAGNOSIS — E78.5 HYPERLIPIDEMIA LDL GOAL <130: ICD-10-CM

## 2020-08-25 PROCEDURE — 99214 OFFICE O/P EST MOD 30 MIN: CPT | Performed by: FAMILY MEDICINE

## 2020-08-25 RX ORDER — FUROSEMIDE 40 MG
40 TABLET ORAL EVERY MORNING
Qty: 90 TABLET | Refills: 1 | Status: SHIPPED | OUTPATIENT
Start: 2020-08-25 | End: 2021-03-29

## 2020-08-25 RX ORDER — AMLODIPINE BESYLATE 5 MG/1
5 TABLET ORAL DAILY
Qty: 90 TABLET | Refills: 1 | Status: SHIPPED | OUTPATIENT
Start: 2020-08-25 | End: 2021-03-29

## 2020-08-25 RX ORDER — LOSARTAN POTASSIUM 50 MG/1
50 TABLET ORAL DAILY
Qty: 90 TABLET | Refills: 1 | Status: SHIPPED | OUTPATIENT
Start: 2020-08-25 | End: 2021-02-23

## 2020-08-25 RX ORDER — ATORVASTATIN CALCIUM 40 MG/1
40 TABLET, FILM COATED ORAL DAILY
Qty: 90 TABLET | Refills: 3 | Status: SHIPPED | OUTPATIENT
Start: 2020-08-25 | End: 2021-08-31

## 2020-08-25 NOTE — PROGRESS NOTES
SUBJECTIVE:  Halle Saucedo, a 89 year old female scheduled an appointment to discuss the following issues:  Follow-up htn, hypothyroid and high cholesterol. Seeing cardiology for a.fib and history angioplasty s/p MI. outside blood pressure readings normal. No chest pain. Wearing compression stockings. No shortness of breath.  Limited exercise. No nausea, vomiting or diarrhea. No black/bloody stools. Emotionally doing. No urine changes or hematuria.   No gerd symptoms.   Medical, social, surgical, and family histories reviewed.    ROS:  All other ROS negative.     OBJECTIVE:  /64   Pulse 63   Temp 98.2  F (36.8  C) (Tympanic)   Wt 59 kg (130 lb)   SpO2 97%   BMI 24.56 kg/m    EXAM:  GENERAL APPEARANCE: healthy, alert and no distress  EYES: EOMI,  PERRL  HENT: ear canals and TM's normal and nose and mouth without ulcers or lesions  NECK: no adenopathy, no asymmetry, masses, or scars and thyroid normal to palpation  RESP: lungs clear to auscultation - no rales, rhonchi or wheezes  CV: regular rates and rhythm, normal S1 S2, no S3 or S4 and no murmur, click or rub -  ABDOMEN:  soft, nontender, no HSM or masses and bowel sounds normal  MS: extremities normal- no gross deformities noted, no evidence of inflammation in joints, FROM in all extremities.  NEURO: Normal strength and tone, sensory exam grossly normal, mentation intact and speech normal  PSYCH: mentation appears normal and affect normal/bright    ASSESSMENT / PLAN:  (E78.5) Hyperlipidemia LDL goal <130  Comment: stable  Plan: atorvastatin (LIPITOR) 40 MG tablet        Reveiwed risks and side effects of medication      (I10) Hypertension goal BP (blood pressure) < 150/90  Comment: stable  Plan: amLODIPine (NORVASC) 5 MG tablet, furosemide         (LASIX) 40 MG tablet, losartan (COZAAR) 50 MG         tablet        Continue self-monitor. Seeing cardiology too. Limit sodium. Chest pain or shortness of breath to er. Recheck in 6 months  Sooner if worse/new  issues. Call/email with questions/concerns     GERD stable. Continue prn prilosec.     Santino Gordon MD

## 2020-10-08 ENCOUNTER — APPOINTMENT (OUTPATIENT)
Dept: URBAN - METROPOLITAN AREA CLINIC 252 | Age: 85
Setting detail: DERMATOLOGY
End: 2020-10-08

## 2020-10-08 VITALS — WEIGHT: 130 LBS | RESPIRATION RATE: 16 BRPM | HEIGHT: 61 IN

## 2020-10-08 DIAGNOSIS — L72.8 OTHER FOLLICULAR CYSTS OF THE SKIN AND SUBCUTANEOUS TISSUE: ICD-10-CM

## 2020-10-08 PROCEDURE — 99213 OFFICE O/P EST LOW 20 MIN: CPT

## 2020-10-08 PROCEDURE — OTHER COUNSELING: OTHER

## 2020-10-08 PROCEDURE — OTHER BENIGN DESTRUCTION COSMETIC: OTHER

## 2020-10-08 ASSESSMENT — LOCATION DETAILED DESCRIPTION DERM
LOCATION DETAILED: LEFT INFERIOR CENTRAL MALAR CHEEK
LOCATION DETAILED: LEFT LATERAL MALAR CHEEK
LOCATION DETAILED: LEFT INFERIOR MEDIAL MALAR CHEEK
LOCATION DETAILED: LEFT CENTRAL MALAR CHEEK
LOCATION DETAILED: LEFT MID PREAURICULAR CHEEK
LOCATION DETAILED: LEFT SUPERIOR LATERAL MALAR CHEEK

## 2020-10-08 ASSESSMENT — LOCATION SIMPLE DESCRIPTION DERM
LOCATION SIMPLE: LEFT CHEEK
LOCATION SIMPLE: LEFT CHEEK

## 2020-10-08 ASSESSMENT — LOCATION ZONE DERM
LOCATION ZONE: FACE
LOCATION ZONE: FACE

## 2020-10-08 NOTE — PROCEDURE: BENIGN DESTRUCTION COSMETIC
Post-Care Instructions: - Patient was instructed to avoid picking at any of the treated lesions.\\n- Vaseline ointment or Aquaphor can be applied to any open wounds daily until healed.
Detail Level: Detailed
Anesthesia Volume In Cc: 0.5
Consent: - Risks discussed include but are not limited to pain, crusting, scabbing, scarring, temporary or permanent pigmentary change, recurrence, incomplete resolution, and infection.\\n- The patient understands that the procedure is cosmetic in nature and is not covered by or billable to insurance.
Price (Use Numbers Only, No Special Characters Or $): 100

## 2020-10-08 NOTE — HPI: SKIN LESIONS
Is This A New Presentation, Or A Follow-Up?: Growths
Additional History: Getting more and more.  Denies drainage.

## 2020-10-13 DIAGNOSIS — I10 HYPERTENSION GOAL BP (BLOOD PRESSURE) < 150/90: ICD-10-CM

## 2020-10-14 RX ORDER — POTASSIUM CHLORIDE 750 MG/1
10 TABLET, EXTENDED RELEASE ORAL 2 TIMES DAILY
Qty: 180 TABLET | Refills: 0 | Status: SHIPPED | OUTPATIENT
Start: 2020-10-14 | End: 2021-01-06

## 2020-11-30 ENCOUNTER — OFFICE VISIT (OUTPATIENT)
Dept: FAMILY MEDICINE | Facility: CLINIC | Age: 85
End: 2020-11-30
Payer: COMMERCIAL

## 2020-11-30 VITALS
WEIGHT: 132 LBS | TEMPERATURE: 97.2 F | DIASTOLIC BLOOD PRESSURE: 73 MMHG | HEART RATE: 72 BPM | SYSTOLIC BLOOD PRESSURE: 131 MMHG | BODY MASS INDEX: 24.94 KG/M2 | OXYGEN SATURATION: 98 %

## 2020-11-30 DIAGNOSIS — I10 HYPERTENSION GOAL BP (BLOOD PRESSURE) < 150/90: ICD-10-CM

## 2020-11-30 DIAGNOSIS — Z23 NEED FOR PROPHYLACTIC VACCINATION AND INOCULATION AGAINST INFLUENZA: ICD-10-CM

## 2020-11-30 DIAGNOSIS — M65.30 TRIGGER FINGER, ACQUIRED: Primary | ICD-10-CM

## 2020-11-30 PROCEDURE — 90662 IIV NO PRSV INCREASED AG IM: CPT | Performed by: FAMILY MEDICINE

## 2020-11-30 PROCEDURE — G0008 ADMIN INFLUENZA VIRUS VAC: HCPCS | Performed by: FAMILY MEDICINE

## 2020-11-30 PROCEDURE — 99214 OFFICE O/P EST MOD 30 MIN: CPT | Mod: 25 | Performed by: FAMILY MEDICINE

## 2020-11-30 NOTE — PROGRESS NOTES
SUBJECTIVE:  Halle Saucedo, a 89 year old female scheduled an appointment to discuss the following issues:  Need for prophylactic vaccination and inoculation against influenza  Fingers locking right ring finger. right handed. On/off issues past 2 months. 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 No swelling. Tapping. No similar issues.   Water intake ok. 2 cups/coffee/day throughout day.   Outside blood pressure readings ok. Average 120-130/80. No chest pain or shortness of breath. No urine changes.   Mild issues with left ring finger too but no pain.   Medical, social, surgical, and family histories reviewed.    ROS:  All other ROS negative.       OBJECTIVE:  /73   Pulse 72   Temp 97.2  F (36.2  C) (Tympanic)   Wt 59.9 kg (132 lb)   SpO2 98%   BMI 24.94 kg/m    EXAM:  GENERAL APPEARANCE: healthy, alert and no distress  NECK: no adenopathy, no asymmetry, masses, or scars and thyroid normal to palpation  RESP: lungs clear to auscultation - no rales, rhonchi or wheezes  CV: regular rates and rhythm, normal S1 S2, no S3 or S4 and no murmur, click or rub -  MS: extremities normal- no gross deformities noted, no evidence of inflammation in joints, FROM in all extremities.  MS: arthritic changes in fingers diffusely but most marked right 4th finger and some clicking/locking with flexion/extension.   SKIN: no suspicious lesions or rashes  NEURO: Normal strength and tone, sensory exam grossly normal, mentation intact and speech normal  PSYCH: mentation appears normal and affect normal/bright    ASSESSMENT / PLAN:  (M65.30) Trigger finger, acquired  (primary encounter diagnosis)    Plan: Orthopedic & Spine  Referral        Decrease caffeine and increase water. Keep warm and mildred tape to 3rd finger - coban given. Expected course and warning signs reviewed. Follow-up ortho if not improving/worse    (Z23) Need for prophylactic vaccination and inoculation against influenza  Plan: FLUZONE HIGH DOSE 65+  [00059]             (I10) Hypertension goal BP (blood pressure) < 150/90  Comment: stable  Plan: continue self-monitor and meds. Recheck in 4 months for labs/etc. Sooner if worse. No issues. Chest pain or shortness of breath to er.       Santino Gordon MD

## 2020-12-07 ENCOUNTER — OFFICE VISIT (OUTPATIENT)
Dept: ORTHOPEDICS | Facility: CLINIC | Age: 85
End: 2020-12-07
Payer: COMMERCIAL

## 2020-12-07 ENCOUNTER — ANCILLARY PROCEDURE (OUTPATIENT)
Dept: GENERAL RADIOLOGY | Facility: CLINIC | Age: 85
End: 2020-12-07
Attending: PEDIATRICS
Payer: COMMERCIAL

## 2020-12-07 VITALS — SYSTOLIC BLOOD PRESSURE: 158 MMHG | WEIGHT: 132 LBS | BODY MASS INDEX: 24.94 KG/M2 | DIASTOLIC BLOOD PRESSURE: 74 MMHG

## 2020-12-07 DIAGNOSIS — M79.644 FINGER PAIN, RIGHT: ICD-10-CM

## 2020-12-07 DIAGNOSIS — M19.041 ARTHRITIS OF RIGHT HAND: ICD-10-CM

## 2020-12-07 DIAGNOSIS — M65.341 TRIGGER RING FINGER OF RIGHT HAND: Primary | ICD-10-CM

## 2020-12-07 PROCEDURE — 99203 OFFICE O/P NEW LOW 30 MIN: CPT | Performed by: PEDIATRICS

## 2020-12-07 PROCEDURE — 73130 X-RAY EXAM OF HAND: CPT | Mod: RT | Performed by: RADIOLOGY

## 2020-12-07 NOTE — LETTER
"    12/7/2020         RE: Halle Saucedo  [address] [facility] [id number]        Dear Colleague,    Thank you for referring your patient, Halle Saucedo, to the [facility]. Please see a copy of my visit note below.    Sports Medicine Clinic Visit    PCP: Santino Gordon    Halle Saucedo is a 89 year old female who is seen  in consultation at the request of  Santino Gordon M.D. presenting with her daughter.    Injury: No injury, the patient states that all of the sudden her ring finger on her right hand started to ache and \"collapse\" or lock. She states in the morning her finger is normal, but by the afternoon her finger gets stuck.  No pain unless it is stuck.    Location of Pain: right ring finger  Duration of Pain: 3-4 month(s)  Rating of Pain at worst: 7/10  Rating of Pain Currently: 0/10  Symptoms are better with: nothing - stretching helps  Symptoms are worse with: mildred taping  Additional Features:   Positive: catching, locking, finger will get stuck in flexion   Negative: swelling, bruising, popping, grinding, paresthesias, numbness, weakness, pain in other joints and systemic symptoms  Other evaluation and/or treatments so far consists of: taping  Prior History of related problems: denies.    Social History: retired    Review of Systems  Skin: no bruising, no swelling  Musculoskeletal: as above  Neurologic: no numbness, paresthesias  Remainder of review of systems is negative including constitutional, CV, pulmonary, GI, except as noted in HPI or medical history.    Patient's current problem list, past medical and surgical history, and family history were reviewed.    Patient Active Problem List   Diagnosis     HYPERLIPIDEMIA LDL GOAL <130     Advanced directives, counseling/discussion     Glaucoma associated with anomalies of iris     PVD (peripheral vascular disease) (H)     Hard of hearing     Gout     Headache     GERD (gastroesophageal reflux disease)     " Hypertension goal BP (blood pressure) < 150/90     Melanoma of skin (H)     Hypothyroidism     Other specified hypothyroidism     Seasonal allergic rhinitis, unspecified allergic rhinitis trigger     ERRONEOUS ENCOUNTER--DISREGARD     Stenosis of carotid artery, unspecified laterality     Atrial fibrillation, unspecified type (H)     ST elevation myocardial infarction (STEMI), unspecified artery (H)     Coronary artery disease involving native coronary artery of native heart with unstable angina pectoris (H)     Past Medical History:   Diagnosis Date     Glaucoma     left and right eyes     Other and unspecified hyperlipidemia      Unspecified essential hypertension      Past Surgical History:   Procedure Laterality Date     CATARACT IOL, RT/LT       D & C       TONSILLECTOMY & ADENOIDECTOMY      childhood     Family History   Problem Relation Age of Onset     Hypertension Brother      Hypertension Sister      Hypertension Sister        Objective  BP (!) 158/74   Wt 59.9 kg (132 lb)   BMI 24.94 kg/m      GENERAL APPEARANCE: healthy, alert and no distress   GAIT: NORMAL  SKIN: no suspicious lesions or rashes  HEENT: Sclera clear, anicteric  CV: good peripheral pulses  RESP: Breathing not labored  NEURO: Normal strength and tone, mentation intact and speech normal  PSYCH:  mentation appears normal and affect normal/bright    Bilateral Wrist and Hand exam    Inspection:       Arthritis deformities throughout the hand    Tender:       None - there is triggering at A1 pulley of right 4th digit    Non Tender:       Remainder of the Wrist and Hand bilateral    ROM:       Able to flex and extend at all digits  Bilateral, full motion    Strength:       5/5 strength in the muscles of the hand, wrist and forearm bilateral    Neurovascular:       2+ radial pulses bilaterally with brisk capillary refill and      normal sensation to light touch in the radial, median and ulnar nerve distributions      Radiology  I ordered,  visualized and reviewed these images with the patient  RIGHT HAND THREE OR MORE VIEWS  12/7/2020 1:26 PM   HISTORY: Finger pain, right.  COMPARISON: None.                                                            IMPRESSION: No acute-appearing bony abnormality. Generalized bony  demineralization. Nonspecific joint space narrowing at multiple  metacarpophalangeal joints, especially the second and third  metacarpophalangeal joints. Osteoarthritis at multiple interphalangeal  joints. Periarticular calcifications are seen in relation to the  second and third metatarsal heads of uncertain etiology and  significance. These may be capsular calcifications and related to  calcium pyrophosphate deposition disease. 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 Advanced osteoarthritis at the triscaphe joint and moderate  osteoarthritis at the first carpometacarpal joint. No significant soft  tissue swelling.    Assessment:  1. Trigger ring finger of right hand    2. Arthritis of right hand      Discussed with her the cause of this including direct trauma and repetitive trauma or activity such as gripping/grasping.  Treatment consists of avoiding the offending activity.  Using padded gloves for gripping/grasping activity.  Discussed benefits of splinting finger to prevent triggering and overuse - can trial a different splint as opposed to buddy taping today. Pending course with splinting, a corticosteroid injection could be considered.  Home Handout Given    Halle to follow up with Primary Care provider regarding elevated blood pressure.    Plan:  - Today's Plan of Care:  Trial of Splinting as needed with activities that cause triggering  Rehab: Occupational Therapy: [facility] - [phone]    -We also discussed other future treatment options:  Consideration of corticosteroid injection  Referral to orthopedic surgery    Follow Up: 1 month and as  needed    Concerning signs and symptoms were reviewed.  The patient expressed understanding of this management plan and all questions were answered at this time.    Thanks for the opportunity to participate in the care of this patient, I will keep you updated on their progress.    CC: Santino Mario MD [facility]  Primary Care Sports Medicine  [facility]      Again, thank you for allowing me to participate in the care of your patient.        Sincerely,        Jazmin Mario MD

## 2020-12-07 NOTE — PROGRESS NOTES
"Sports Medicine Clinic Visit    PCP: Santino Gordon    Halle Saucedo is a 89 year old female who is seen  in consultation at the request of  Santino Gordon M.D. presenting with her daughter.    Injury: No injury, the patient states that all of the sudden her ring finger on her right hand started to ache and \"collapse\" or lock. She states in the morning her finger is normal, but by the afternoon her finger gets stuck.  No pain unless it is stuck.    Location of Pain: right ring finger  Duration of Pain: 3-4 month(s)  Rating of Pain at worst: 7/10  Rating of Pain Currently: 0/10  Symptoms are better with: nothing - stretching helps  Symptoms are worse with: mildred taping  Additional Features:   Positive: catching, locking, finger will get stuck in flexion   Negative: swelling, bruising, popping, grinding, paresthesias, numbness, weakness, pain in other joints and systemic symptoms  Other evaluation and/or treatments so far consists of: taping  Prior History of related problems: denies.    Social History: retired    Review of Systems  Skin: no bruising, no swelling  Musculoskeletal: as above  Neurologic: no numbness, paresthesias  Remainder of review of systems is negative including constitutional, CV, pulmonary, GI, except as noted in HPI or medical history.    Patient's current problem list, past medical and surgical history, and family history were reviewed.    Patient Active Problem List   Diagnosis     HYPERLIPIDEMIA LDL GOAL <130     Advanced directives, counseling/discussion     Glaucoma associated with anomalies of iris     PVD (peripheral vascular disease) (H)     Hard of hearing     Gout     Headache     GERD (gastroesophageal reflux disease)     Hypertension goal BP (blood pressure) < 150/90     Melanoma of skin (H)     Hypothyroidism     Other specified hypothyroidism     Seasonal allergic rhinitis, unspecified allergic rhinitis trigger     ERRONEOUS ENCOUNTER--DISREGARD     Stenosis of carotid artery, " unspecified laterality     Atrial fibrillation, unspecified type (H)     ST elevation myocardial infarction (STEMI), unspecified artery (H)     Coronary artery disease involving native coronary artery of native heart with unstable angina pectoris (H)     Past Medical History:   Diagnosis Date     Glaucoma     left and right eyes     Other and unspecified hyperlipidemia      Unspecified essential hypertension      Past Surgical History:   Procedure Laterality Date     CATARACT IOL, RT/LT       D & C       TONSILLECTOMY & ADENOIDECTOMY      childhood     Family History   Problem Relation Age of Onset     Hypertension Brother      Hypertension Sister      Hypertension Sister        Objective  BP (!) 158/74   Wt 59.9 kg (132 lb)   BMI 24.94 kg/m      GENERAL APPEARANCE: healthy, alert and no distress   GAIT: NORMAL  SKIN: no suspicious lesions or rashes  HEENT: Sclera clear, anicteric  CV: good peripheral pulses  RESP: Breathing not labored  NEURO: Normal strength and tone, mentation intact and speech normal  PSYCH:  mentation appears normal and affect normal/bright    Bilateral Wrist and Hand exam    Inspection:       Arthritis deformities throughout the hand    Tender:       None - there is triggering at A1 pulley of right 4th digit    Non Tender:       Remainder of the Wrist and Hand bilateral    ROM:       Able to flex and extend at all digits  Bilateral, full motion    Strength:       5/5 strength in the muscles of the hand, wrist and forearm bilateral    Neurovascular:       2+ radial pulses bilaterally with brisk capillary refill and      normal sensation to light touch in the radial, median and ulnar nerve distributions      Radiology  I ordered, visualized and reviewed these images with the patient  RIGHT HAND THREE OR MORE VIEWS  12/7/2020 1:26 PM   HISTORY: Finger pain, right.  COMPARISON: None.                                                            IMPRESSION: No acute-appearing bony abnormality.  Generalized bony  demineralization. Nonspecific joint space narrowing at multiple  metacarpophalangeal joints, especially the second and third  metacarpophalangeal joints. Osteoarthritis at multiple interphalangeal  joints. Periarticular calcifications are seen in relation to the  second and third metatarsal heads of uncertain etiology and  significance. These may be capsular calcifications and related to  calcium pyrophosphate deposition disease. 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 Advanced osteoarthritis at the triscaphe joint and moderate  osteoarthritis at the first carpometacarpal joint. No significant soft  tissue swelling.    Assessment:  1. Trigger ring finger of right hand    2. Arthritis of right hand      Discussed with her the cause of this including direct trauma and repetitive trauma or activity such as gripping/grasping.  Treatment consists of avoiding the offending activity.  Using padded gloves for gripping/grasping activity.  Discussed benefits of splinting finger to prevent triggering and overuse - can trial a different splint as opposed to buddy taping today. Pending course with splinting, a corticosteroid injection could be considered.  Home Handout Given    Halle to follow up with Primary Care provider regarding elevated blood pressure.    Plan:  - Today's Plan of Care:  Trial of Splinting as needed with activities that cause triggering  Rehab: Occupational Therapy: [facility] - [phone]    -We also discussed other future treatment options:  Consideration of corticosteroid injection  Referral to orthopedic surgery    Follow Up: 1 month and as needed    Concerning signs and symptoms were reviewed.  The patient expressed understanding of this management plan and all questions were answered at this time.    Thanks for the opportunity to participate in the care of this patient, I will keep you updated on their  progress.    CC: Santino Mario MD CAQ  Primary Care Sports Medicine  [facility]

## 2020-12-07 NOTE — PATIENT INSTRUCTIONS
Halle to follow up with Primary Care provider regarding elevated blood pressure.    Plan:  - Today's Plan of Care:  Trial of Splinting as needed with activities that cause triggering  Rehab: Occupational Therapy: [facility] - [phone]    -We also discussed other future treatment options:  Consideration of corticosteroid injection  Referral to orthopedic surgery    Follow Up: 1 month and as needed    If you have any further questions for your physician or physician s care team you can call [phone] and use option 3 to leave a voice message. Calls received during business hours will be returned same day.

## 2020-12-07 NOTE — RESULT ENCOUNTER NOTE
These results were discussed during office visit.    Jazmin Mario MD, CAQ  Primary Care Sports Medicine  [facility]

## 2021-01-04 DIAGNOSIS — I10 HYPERTENSION GOAL BP (BLOOD PRESSURE) < 150/90: ICD-10-CM

## 2021-01-06 RX ORDER — POTASSIUM CHLORIDE 750 MG/1
TABLET, EXTENDED RELEASE ORAL
Qty: 180 TABLET | Refills: 0 | Status: SHIPPED | OUTPATIENT
Start: 2021-01-06 | End: 2021-03-29

## 2021-01-06 NOTE — TELEPHONE ENCOUNTER
Pres[place] [facility] [place].  Sheryl Peterson RN

## 2021-01-28 ENCOUNTER — OFFICE VISIT (OUTPATIENT)
Dept: ORTHOPEDICS | Facility: CLINIC | Age: 86
End: 2021-01-28
Payer: COMMERCIAL

## 2021-01-28 VITALS
WEIGHT: 131.5 LBS | DIASTOLIC BLOOD PRESSURE: 57 MMHG | BODY MASS INDEX: 24.83 KG/M2 | SYSTOLIC BLOOD PRESSURE: 159 MMHG | HEIGHT: 61 IN

## 2021-01-28 DIAGNOSIS — M25.562 CHRONIC PAIN OF BOTH KNEES: ICD-10-CM

## 2021-01-28 DIAGNOSIS — G89.29 CHRONIC PAIN OF BOTH KNEES: ICD-10-CM

## 2021-01-28 DIAGNOSIS — M25.561 CHRONIC PAIN OF BOTH KNEES: ICD-10-CM

## 2021-01-28 DIAGNOSIS — M17.0 PRIMARY OSTEOARTHRITIS OF BOTH KNEES: Primary | ICD-10-CM

## 2021-01-28 PROCEDURE — 99213 OFFICE O/P EST LOW 20 MIN: CPT | Mod: 25 | Performed by: ORTHOPAEDIC SURGERY

## 2021-01-28 PROCEDURE — 20610 DRAIN/INJ JOINT/BURSA W/O US: CPT | Mod: 50 | Performed by: ORTHOPAEDIC SURGERY

## 2021-01-28 RX ORDER — BUPIVACAINE HYDROCHLORIDE 2.5 MG/ML
4 INJECTION, SOLUTION INFILTRATION; PERINEURAL
Status: DISCONTINUED | OUTPATIENT
Start: 2021-01-28 | End: 2021-04-05

## 2021-01-28 RX ORDER — METHYLPREDNISOLONE ACETATE 80 MG/ML
80 INJECTION, SUSPENSION INTRA-ARTICULAR; INTRALESIONAL; INTRAMUSCULAR; SOFT TISSUE
Status: DISCONTINUED | OUTPATIENT
Start: 2021-01-28 | End: 2021-04-05

## 2021-01-28 RX ADMIN — METHYLPREDNISOLONE ACETATE 80 MG: 80 INJECTION, SUSPENSION INTRA-ARTICULAR; INTRALESIONAL; INTRAMUSCULAR; SOFT TISSUE at 15:05

## 2021-01-28 RX ADMIN — BUPIVACAINE HYDROCHLORIDE 4 ML: 2.5 INJECTION, SOLUTION INFILTRATION; PERINEURAL at 15:05

## 2021-01-28 ASSESSMENT — MIFFLIN-ST. JEOR: SCORE: 933.86

## 2021-01-28 ASSESSMENT — PAIN SCALES - GENERAL: PAINLEVEL: EXTREME PAIN (8)

## 2021-01-28 NOTE — LETTER
1/28/2021         RE: Halle Saucedo  [address] [facility] [id number]        Dear Colleague,    Thank you for referring your patient, Halle Sacuedo, to the [facility]. Please see a copy of my visit note below.    CHIEF COMPLAINT:   Chief Complaint   Patient presents with     Knee Pain     Bilateral knee pain. Patient notes she has had pain before and she has had injections which have helped a lot. Last injections: 11/12/15. She has had some pain over the last year but it has gotten progressively worse. She would like to have more injections today.       HISTORY OF PRESENT ILLNESS    Halle Saucedo is a 89 year old female seen for evaluation of ongoing bilateral knee pain with no known injury. Seen for the same 2015 and noted to have primary osteoarthritis, received injections. Returns today stating pain has returned the past year and getting worse. The injections back in 2015 worked well for several years. She'd like repeat injections today. She is present with her daughter today.       Right knee is wrose than left. Her pain has been on and off for many years.  Her pain is over the anterior aspect of her knee. She has stiffness in both knees. She has been using Advil for the pain. No other treatments. Denies any numbness or tingling. She leaves to [facility] for the winter.    Present symptoms: mild pain, anterior pain, aching and sharp pain, no locking or catching.  Pain severity: 8/10  Frequency of symptoms: frequently  Exacerbating Factors: weight bearing, stairs, [id number]  Relieving Factors: rest  Night Pain: No  Pain while at rest: No   Numbness or tingling: No   Patient has tried:     NSAIDS: Yes      Physical Therapy: No      Activity modification: No      Bracing: No      Injections: yes, 11/2015     Ice: No      Assistive device:  No     Other: none    Orthopaedic PMH: none    Other PMH:  has a past medical history of Glaucoma, Other and unspecified  hyperlipidemia, and Unspecified essential hypertension.  Patient Active Problem List    Diagnosis Date Noted     Coronary artery disease involving native coronary artery of native heart with unstable angina pectoris (H) 12/13/2018     Priority: Medium     Atrial fibrillation, unspecified type (H) 10/15/2017     Priority: Medium     ST elevation myocardial infarction (STEMI), unspecified artery (H) 10/15/2017     Priority: Medium     Stenosis of carotid artery, unspecified laterality 10/02/2017     Priority: Medium     ERRONEOUS ENCOUNTER--DISREGARD 09/29/2017     Priority: Medium     Seasonal allergic rhinitis, unspecified allergic rhinitis trigger 04/07/2017     Priority: Medium     Other specified hypothyroidism 10/06/2016     Priority: Medium     Hypothyroidism 04/01/2016     Priority: Medium     Hypertension goal BP (blood pressure) < 150/90 08/21/2015     Priority: Medium     Melanoma of skin (H) 08/21/2015     Priority: Medium     GERD (gastroesophageal reflux disease) 04/02/2015     Priority: Medium     Headache 10/03/2013     Priority: Medium     Problem list name updated by automated process. Provider to review       Gout 12/26/2012     Priority: Medium     Advanced directives, counseling/discussion 06/27/2011     Priority: Medium     Discussed Advance Directive planning with patient; information given to patient to review.  Patient thinks she has one but it was a long time ago.  Advance Directive Problem List Overview:   Name Relationship Phone    Primary Health Care Agent Dianne Saucedo daughter [phone]         Alternative Health Care Agent Destin Saucedo son [phone]     Patient attended Honoring choice class 10-27-11. Monica Esparza RN    11/14/2011    Patient completed advance directive.  Patient wants CPR unless her provider determines any of the following: incurable illness or injury, dying, no reasonable chance of survival, or little chance of long-term survival if heart or breathing stops and the  process of resuscitation would cause significant suffering.  Please see advance directive for all specifics.  Code status changed to SPECIAL.     Marita Burgos RN                Glaucoma associated with anomalies of iris 06/27/2011     Priority: Medium     PVD (peripheral vascular disease) (H) 06/27/2011     Priority: Medium     Hard of hearing 06/27/2011     Priority: Medium     HYPERLIPIDEMIA LDL GOAL <130 10/31/2010     Priority: Medium     Surgical Hx:  has a past surgical history that includes cataract iol, rt/lt; tonsillectomy & adenoidectomy; and d & c.    Medications:   Current Outpatient Medications:      amLODIPine (NORVASC) 5 MG tablet, Take 1 tablet (5 mg) by mouth daily for blood pressure Pharmacy ok to hold prescription until due, Disp: 90 tablet, Rfl: 1     apixaban ANTICOAGULANT (ELIQUIS) 2.5 MG tablet, Take by mouth 2 times daily, Disp: , Rfl:      aspirin 81 MG tablet, Take 81 mg by mouth daily, Disp: , Rfl:      atorvastatin (LIPITOR) 40 MG tablet, Take 1 tablet (40 mg) by mouth daily For cholesterol, Disp: 90 tablet, Rfl: 3     furosemide (LASIX) 40 MG tablet, Take 1 tablet (40 mg) by mouth every morning Pharmacy ok to hold prescription until due, Disp: 90 tablet, Rfl: 1     losartan (COZAAR) 50 MG tablet, Take 1 tablet (50 mg) by mouth daily for blood pressure Pharmacy ok to hold prescription until due, Disp: 90 tablet, Rfl: 1     LUMIGAN 0.01 % SOLN, Place 1 drop into both eyes At Bedtime , Disp: , Rfl:      metoprolol (TOPROL-XL) 25 MG 24 hr tablet, Take 25 mg by mouth daily, Disp: , Rfl:      Multiple Vitamins-Minerals (MULTIVITAMIN ADULTS 50+) TABS, Take 1 tablet by mouth daily, Disp: , Rfl:      netarsudil dimesylate (RHOPRESSA) 0.02 % SOLN, At Bedtime, Disp: , Rfl:      nitroGLYcerin (NITROSTAT) 0.4 MG sublingual tablet, For chest pain place 1 tablet under the tongue every 5 minutes for 3 doses. If symptoms persist 5 minutes after 1st dose call 911., Disp: , Rfl:      omeprazole  "(PRILOSEC) 20 MG DR capsule, TAKE 1 CAPSULE(20 MG) BY MOUTH DAILY FOR 10 DAYS THEN AS NEEDED FOR HEARTBURN/ UPSET STOMACH, Disp: 90 capsule, Rfl: 0     potassium chloride ER (K-TAB/KLOR-CON) 10 MEQ CR tablet, TAKE 1 TABLET BY MOUTH TWICE DAILY., Disp: 180 tablet, Rfl: 0     VITAMIN D, CHOLECALCIFEROL, PO, Take 2 tablets by mouth daily, Disp: , Rfl:      vitamin E 400 UNIT capsule, Take 800 Units by mouth daily, Disp: , Rfl:     Current Facility-Administered Medications:      bupivacaine (MARCAINE) 0.25 % injection 3 mL, 3 mL, , , Scar Maharaj MD, 3 mL at 01/06/20 1157     lidocaine 1 % injection 4 mL, 4 mL, , , Scar Maharaj MD, 4 mL at 01/06/20 1157     triamcinolone (KENALOG-40) injection 80 mg, 80 mg, , , Scar Maharaj MD, 80 mg at 01/06/20 1157    Allergies:   Allergies   Allergen Reactions     Alphagan [Brimonidine Tartrate]      Azopt [Brinzolamide]      Beta Adrenergic Blockers      Bisphosphonates      Estrogens      Gemfibrozil      Trusopt [Dorzolamide] Itching     rash     Xalatan [Latanoprost]      Zetia [Ezetimibe]      Zocor [Hmg-Coa-R Inhibitors]        Social Hx: retired   reports that she has never smoked. She has never used smokeless tobacco. She reports current alcohol use. She reports that she does not use drugs.    Family Hx: family history includes Hypertension in her brother, sister, and sister.    REVIEW OF SYSTEMS:   CONSTITUTIONAL:NEGATIVE for fever, chills, change in weight  INTEGUMENTARY/SKIN: NEGATIVE for worrisome rashes, moles or lesions  MUSCULOSKELETAL:See HPI above  NEURO: NEGATIVE for weakness, dizziness or paresthesias    PHYSICAL EXAM:  BP (!) 159/57   Ht 1.549 m (5' 1\")   Wt 59.6 kg (131 lb 8 oz)   BMI 24.85 kg/m     GENERAL APPEARANCE: elderly, alert, no distress ; accompanied by her daughter.  SKIN: no suspicious lesions or rashes  NEURO: Normal strength and tone, mentation intact and speech normal  PSYCH:  mentation appears normal and affect normal, not " anxious  RESPIRATORY: No increased work of breathing.  HANDS: no clubbing, nail pitting     BILATERAL LOWER EXTREMITIES:  Gait: favors the right.  Alignment: varus  No gross deformities or masses.  Bilateral Quad atrophy, strength weak  Intact sensation deep peroneal nerve, superficial peroneal nerve, med/lat tibial nerve, sural nerve, saphenous nerve  Intact EHL, EDL, TA, FHL, GS, quadriceps hamstrings and hip flexors  Toes warm and well perfused, brisk capillary refill. Palpable 2+ dp pulses.  Bilateral calf soft and nttp or squeeze.  Edema: 1+  Bilateral varicose veins.    LEFT KNEE EXAM:    Skin: intact, no ecchymosis or erythema  ROM: 0 degrees extension to 120 degrees flexion  Tight hamstrings on straight leg raise.  Effusion: none  Tender: medial joint line, lateral joint line, pes    MCL: stable, and non-painful at both 0 and 30 degrees knee flexion  Varus stress: stable, and non-painful at both 0 and 30 degrees knee flexion  Lachmans: neg, firm endpoint  Posterior Drawer stable  Patellofemoral joint:                Apprehension: negative              Crepitations: positive    Grind: positive    RIGHT KNEE EXAM:    Skin: intact, no ecchymosis or erythema  ROM: 2 degrees extension to 120 degrees flexion  Tight hamstrings on straight leg raise.  Effusion: none  Tender: medial joint line, lateral joint line, pes    MCL: stable, and non-painful at both 0 and 30 degrees knee flexion  Varus stress: stable, and non-painful at both 0 and 30 degrees knee flexion  Lachmans: neg, firm endpoint  Posterior Drawer stable  Patellofemoral joint:                Apprehension: negative              Crepitations: positive   Grind: positive      X-RAY:  no new images today. 3 views bilateral knee from 11/12/2015 were again reviewed in clinic today. On my review, no obvious fractures or dislocations. shows moderate medial joint space narrowing, shows moderate lateral joint space narrowing and shows severe narrowing of the  "patellofemoral joint, bone on bone with subchondral sclerosis and flattening. Moderate osteophytes. Chondrocalcinosis. Vessel calcification with stents visible posteriorly.      Impression:   89yo female with bilateral knee pain, patellofemoral arthritis, primary moderate to severe osteoarthritis.      Plan:   * reviewed imaging studies with patient and daughter today, showing arthritic changes, or wearing of the cartilage in the knee. This can be caused by normal \"wear and tear\" over the years or following prior injury to the knee.    Non-surgical treatment for knee arthritis includes:    * rest, sitting  * Activity modification - avoid impact activities or activities that aggravate symptoms.  * NSAIDS (non-steroidal anti-inflammatory medications; e.g. Aleve, advil, motrin, ibuprofen) - regular use for inflammation ( twice daily or three times daily), with food, as long as no contra-indications Please discuss with primary care doctor if needed  * ice, 15-20 minutes at a time several times a day or as needed.  * Strengthening of quadriceps muscles  * Physical Therapy for strengthening, stretching and range of motion exercises of legs  * Tylenol as needed for pain, consider Tylenol arthritis or similar  * Weight loss: Weight loss:  Body mass index is 24.85 kg/m .. weight loss benefits, not only for the current pain symptoms, but also overall health. Recommend a good diet plan that works for the patient, with the assistance of a dietician or primary care doctor as needed. Also, a good, low-impact exercise program for at least 20 minutes per day, 3 times per week, such as exercise bike, elliptical , or pool.  * Exercise: low impact such as stationary bike, elliptical, pool.  * Injections: cortisone versus viscosupplementation (hyaluronic acid, \"rooster comb\", \"gel shots\"); risks and perceived benefits discussed today. Patient elects to proceed with a right knee cortisone injection.  * Bracing: bracing the knee " may offer some relief of symptoms when worn and provide some stability.  * over the counter supplements such as glucosamine and chondroitin sulfate may help with joint pain.  * topical ointments may help as well    * return to clinic as needed.    Scar Maharaj M.D., M.S.  Dept. of Orthopaedic Surgery  [facility]    Large Joint Injection/Arthocentesis: bilateral knee    Date/Time: 1/28/2021 3:05 PM  Performed by: Reginald Neves PA  Authorized by: Scar Maharaj MD     Indications:  Pain and osteoarthritis  Needle Size:  22 G  Guidance: landmark guided    Approach:  Anteromedial  Location:  Knee  Laterality:  Bilateral      Medications (Right):  80 mg methylPREDNISolone 80 MG/ML; 4 mL bupivacaine 0.25 %  Medications (Left):  80 mg methylPREDNISolone 80 MG/ML; 4 mL bupivacaine 0.25 %  Outcome:  Tolerated well, no immediate complications  Procedure discussed: discussed risks, benefits, and alternatives    Consent Given by:  Patient  Prep: patient was prepped and draped in usual sterile fashion              Again, thank you for allowing me to participate in the care of your patient.        Sincerely,        Scar Maharaj MD

## 2021-01-28 NOTE — PROGRESS NOTES
CHIEF COMPLAINT:   Chief Complaint   Patient presents with     Knee Pain     Bilateral knee pain. Patient notes she has had pain before and she has had injections which have helped a lot. Last injections: 11/12/15. She has had some pain over the last year but it has gotten progressively worse. She would like to have more injections today.       HISTORY OF PRESENT ILLNESS    Halle Saucedo is a 89 year old female seen for evaluation of ongoing bilateral knee pain with no known injury. Seen for the same 2015 and noted to have primary osteoarthritis, received injections. Returns today stating pain has returned the past year and getting worse. The injections back in 2015 worked well for several years. She'd like repeat injections today. She is present with her daughter today.       Right knee is wrose than left. Her pain has been on and off for many years.  Her pain is over the anterior aspect of her knee. She has stiffness in both knees. She has been using Advil for the pain. No other treatments. Denies any numbness or tingling. She leaves to [facility] for the winter.    Present symptoms: mild pain, anterior pain, aching and sharp pain, no locking or catching.  Pain severity: 8/10  Frequency of symptoms: frequently  Exacerbating Factors: weight bearing, stairs, [id number]  Relieving Factors: rest  Night Pain: No  Pain while at rest: No   Numbness or tingling: No   Patient has tried:     NSAIDS: Yes      Physical Therapy: No      Activity modification: No      Bracing: No      Injections: yes, 11/2015     Ice: No      Assistive device:  No     Other: none    Orthopaedic PMH: none    Other PMH:  has a past medical history of Glaucoma, Other and unspecified hyperlipidemia, and Unspecified essential hypertension.  Patient Active Problem List    Diagnosis Date Noted     Coronary artery disease involving native coronary artery of native heart with unstable angina pectoris (H) 12/13/2018     Priority: Medium     Atrial  fibrillation, unspecified type (H) 10/15/2017     Priority: Medium     ST elevation myocardial infarction (STEMI), unspecified artery (H) 10/15/2017     Priority: Medium     Stenosis of carotid artery, unspecified laterality 10/02/2017     Priority: Medium     ERRONEOUS ENCOUNTER--DISREGARD 09/29/2017     Priority: Medium     Seasonal allergic rhinitis, unspecified allergic rhinitis trigger 04/07/2017     Priority: Medium     Other specified hypothyroidism 10/06/2016     Priority: Medium     Hypothyroidism 04/01/2016     Priority: Medium     Hypertension goal BP (blood pressure) < 150/90 08/21/2015     Priority: Medium     Melanoma of skin (H) 08/21/2015     Priority: Medium     GERD (gastroesophageal reflux disease) 04/02/2015     Priority: Medium     Headache 10/03/2013     Priority: Medium     Problem list name updated by automated process. Provider to review       Gout 12/26/2012     Priority: Medium     Advanced directives, counseling/discussion 06/27/2011     Priority: Medium     Discussed Advance Directive planning with patient; information given to patient to review.  Patient thinks she has one but it was a long time ago.  Advance Directive Problem List Overview:   Name Relationship Phone    Primary Health Care Agent Dianne Saucedo daughter [phone]         Alternative Health Care Agent Destin Saucedo son [phone]     Patient attended Honoring choice class 10-27-11. Monica Esparza RN    11/14/2011    Patient completed advance directive.  Patient wants CPR unless her provider determines any of the following: incurable illness or injury, dying, no reasonable chance of survival, or little chance of long-term survival if heart or breathing stops and the process of resuscitation would cause significant suffering.  Please see advance directive for all specifics.  Code status changed to SPECIAL.     Marita Burgos RN                Glaucoma associated with anomalies of iris 06/27/2011     Priority: Medium     PVD  (peripheral vascular disease) (H) 06/27/2011     Priority: Medium     Hard of hearing 06/27/2011     Priority: Medium     HYPERLIPIDEMIA LDL GOAL <130 10/31/2010     Priority: Medium     Surgical Hx:  has a past surgical history that includes cataract iol, rt/lt; tonsillectomy & adenoidectomy; and d & c.    Medications:   Current Outpatient Medications:      amLODIPine (NORVASC) 5 MG tablet, Take 1 tablet (5 mg) by mouth daily for blood pressure Pharmacy ok to hold prescription until due, Disp: 90 tablet, Rfl: 1     apixaban ANTICOAGULANT (ELIQUIS) 2.5 MG tablet, Take by mouth 2 times daily, Disp: , Rfl:      aspirin 81 MG tablet, Take 81 mg by mouth daily, Disp: , Rfl:      atorvastatin (LIPITOR) 40 MG tablet, Take 1 tablet (40 mg) by mouth daily For cholesterol, Disp: 90 tablet, Rfl: 3     furosemide (LASIX) 40 MG tablet, Take 1 tablet (40 mg) by mouth every morning Pharmacy ok to hold prescription until due, Disp: 90 tablet, Rfl: 1     losartan (COZAAR) 50 MG tablet, Take 1 tablet (50 mg) by mouth daily for blood pressure Pharmacy ok to hold prescription until due, Disp: 90 tablet, Rfl: 1     LUMIGAN 0.01 % SOLN, Place 1 drop into both eyes At Bedtime , Disp: , Rfl:      metoprolol (TOPROL-XL) 25 MG 24 hr tablet, Take 25 mg by mouth daily, Disp: , Rfl:      Multiple Vitamins-Minerals (MULTIVITAMIN ADULTS 50+) TABS, Take 1 tablet by mouth daily, Disp: , Rfl:      netarsudil dimesylate (RHOPRESSA) 0.02 % SOLN, At Bedtime, Disp: , Rfl:      nitroGLYcerin (NITROSTAT) 0.4 MG sublingual tablet, For chest pain place 1 tablet under the tongue every 5 minutes for 3 doses. If symptoms persist 5 minutes after 1st dose call 911., Disp: , Rfl:      omeprazole (PRILOSEC) 20 MG DR capsule, TAKE 1 CAPSULE(20 MG) BY MOUTH DAILY FOR 10 DAYS THEN AS NEEDED FOR HEARTBURN/ UPSET STOMACH, Disp: 90 capsule, Rfl: 0     potassium chloride ER (K-TAB/KLOR-CON) 10 MEQ CR tablet, TAKE 1 TABLET BY MOUTH TWICE DAILY., Disp: 180 tablet, Rfl:  "0     VITAMIN D, CHOLECALCIFEROL, PO, Take 2 tablets by mouth daily, Disp: , Rfl:      vitamin E 400 UNIT capsule, Take 800 Units by mouth daily, Disp: , Rfl:     Current Facility-Administered Medications:      bupivacaine (MARCAINE) 0.25 % injection 3 mL, 3 mL, , , Scar Maharaj MD, 3 mL at 01/06/20 1157     lidocaine 1 % injection 4 mL, 4 mL, , , Scar Maharaj MD, 4 mL at 01/06/20 1157     triamcinolone (KENALOG-40) injection 80 mg, 80 mg, , , Scar Maharaj MD, 80 mg at 01/06/20 1157    Allergies:   Allergies   Allergen Reactions     Alphagan [Brimonidine Tartrate]      Azopt [Brinzolamide]      Beta Adrenergic Blockers      Bisphosphonates      Estrogens      Gemfibrozil      Trusopt [Dorzolamide] Itching     rash     Xalatan [Latanoprost]      Zetia [Ezetimibe]      Zocor [Hmg-Coa-R Inhibitors]        Social Hx: retired   reports that she has never smoked. She has never used smokeless tobacco. She reports current alcohol use. She reports that she does not use drugs.    Family Hx: family history includes Hypertension in her brother, sister, and sister.    REVIEW OF SYSTEMS:   CONSTITUTIONAL:NEGATIVE for fever, chills, change in weight  INTEGUMENTARY/SKIN: NEGATIVE for worrisome rashes, moles or lesions  MUSCULOSKELETAL:See HPI above  NEURO: NEGATIVE for weakness, dizziness or paresthesias    PHYSICAL EXAM:  BP (!) 159/57   Ht 1.549 m (5' 1\")   Wt 59.6 kg (131 lb 8 oz)   BMI 24.85 kg/m     GENERAL APPEARANCE: elderly, alert, no distress ; accompanied by her daughter.  SKIN: no suspicious lesions or rashes  NEURO: Normal strength and tone, mentation intact and speech normal  PSYCH:  mentation appears normal and affect normal, not anxious  RESPIRATORY: No increased work of breathing.  HANDS: no clubbing, nail pitting     BILATERAL LOWER EXTREMITIES:  Gait: favors the right.  Alignment: varus  No gross deformities or masses.  Bilateral Quad atrophy, strength weak  Intact sensation deep peroneal " nerve, superficial peroneal nerve, med/lat tibial nerve, sural nerve, saphenous nerve  Intact EHL, EDL, TA, FHL, GS, quadriceps hamstrings and hip flexors  Toes warm and well perfused, brisk capillary refill. Palpable 2+ dp pulses.  Bilateral calf soft and nttp or squeeze.  Edema: 1+  Bilateral varicose veins.    LEFT KNEE EXAM:    Skin: intact, no ecchymosis or erythema  ROM: 0 degrees extension to 120 degrees flexion  Tight hamstrings on straight leg raise.  Effusion: none  Tender: medial joint line, lateral joint line, pes    MCL: stable, and non-painful at both 0 and 30 degrees knee flexion  Varus stress: stable, and non-painful at both 0 and 30 degrees knee flexion  Lachmans: neg, firm endpoint  Posterior Drawer stable  Patellofemoral joint:                Apprehension: negative              Crepitations: positive    Grind: positive    RIGHT KNEE EXAM:    Skin: intact, no ecchymosis or erythema  ROM: 2 degrees extension to 120 degrees flexion  Tight hamstrings on straight leg raise.  Effusion: none  Tender: medial joint line, lateral joint line, pes    MCL: stable, and non-painful at both 0 and 30 degrees knee flexion  Varus stress: stable, and non-painful at both 0 and 30 degrees knee flexion  Lachmans: neg, firm endpoint  Posterior Drawer stable  Patellofemoral joint:                Apprehension: negative              Crepitations: positive   Grind: positive      X-RAY:  no new images today. 3 views bilateral knee from 11/12/2015 were again reviewed in clinic today. On my review, no obvious fractures or dislocations. shows moderate medial joint space narrowing, shows moderate lateral joint space narrowing and shows severe narrowing of the patellofemoral joint, bone on bone with subchondral sclerosis and flattening. Moderate osteophytes. Chondrocalcinosis. Vessel calcification with stents visible posteriorly.      Impression:   89yo female with bilateral knee pain, patellofemoral arthritis, primary moderate to  "severe osteoarthritis.      Plan:   * reviewed imaging studies with patient and daughter today, showing arthritic changes, or wearing of the cartilage in the knee. This can be caused by normal \"wear and tear\" over the years or following prior injury to the knee.    Non-surgical treatment for knee arthritis includes:    * rest, sitting  * Activity modification - avoid impact activities or activities that aggravate symptoms.  * NSAIDS (non-steroidal anti-inflammatory medications; e.g. Aleve, advil, motrin, ibuprofen) - regular use for inflammation ( twice daily or three times daily), with food, as long as no contra-indications Please discuss with primary care doctor if needed  * ice, 15-20 minutes at a time several times a day or as needed.  * Strengthening of quadriceps muscles  * Physical Therapy for strengthening, stretching and range of motion exercises of legs  * Tylenol as needed for pain, consider Tylenol arthritis or similar  * Weight loss: Weight loss:  Body mass index is 24.85 kg/m .. weight loss benefits, not only for the current pain symptoms, but also overall health. Recommend a good diet plan that works for the patient, with the assistance of a dietician or primary care doctor as needed. Also, a good, low-impact exercise program for at least 20 minutes per day, 3 times per week, such as exercise bike, elliptical , or pool.  * Exercise: low impact such as stationary bike, elliptical, pool.  * Injections: cortisone versus viscosupplementation (hyaluronic acid, \"rooster comb\", \"gel shots\"); risks and perceived benefits discussed today. Patient elects to proceed with a right knee cortisone injection.  * Bracing: bracing the knee may offer some relief of symptoms when worn and provide some stability.  * over the counter supplements such as glucosamine and chondroitin sulfate may help with joint pain.  * topical ointments may help as well    * return to clinic as needed.    Scar Maharaj M.D., " M.S.  Dept. of Orthopaedic Surgery  [facility]    Large Joint Injection/Arthocentesis: bilateral knee    Date/Time: 1/28/2021 3:05 PM  Performed by: Reginald Neves PA  Authorized by: Scar Maharaj MD     Indications:  Pain and osteoarthritis  Needle Size:  22 G  Guidance: landmark guided    Approach:  Anteromedial  Location:  Knee  Laterality:  Bilateral      Medications (Right):  80 mg methylPREDNISolone 80 MG/ML; 4 mL bupivacaine 0.25 %  Medications (Left):  80 mg methylPREDNISolone 80 MG/ML; 4 mL bupivacaine 0.25 %  Outcome:  Tolerated well, no immediate complications  Procedure discussed: discussed risks, benefits, and alternatives    Consent Given by:  Patient  Prep: patient was prepped and draped in usual sterile fashion

## 2021-02-22 DIAGNOSIS — I10 HYPERTENSION GOAL BP (BLOOD PRESSURE) < 150/90: ICD-10-CM

## 2021-02-22 NOTE — LETTER
February 23, 2021    Halle Saucedo  [address] [facility] [address]    Dear Halle,       We recently received a refill request for losartan (COZAAR) 50 MG tablet.  We have refilled this for a one time 90 day supply only because you are due for a:    Wellness exam office visit and fasting lab appointment-needed in the next couple of months per Dr Gordon.      Please schedule this lab appointment 4-5 days prior to the office visit.     Please call at your earliest convenience so that there will not be a delay with your future refills.          Thank you,   Your [facility] Team/  [phone]

## 2021-02-23 RX ORDER — LOSARTAN POTASSIUM 50 MG/1
TABLET ORAL
Qty: 90 TABLET | Refills: 0 | Status: SHIPPED | OUTPATIENT
Start: 2021-02-23 | End: 2021-05-25

## 2021-03-04 ENCOUNTER — TELEPHONE (OUTPATIENT)
Dept: FAMILY MEDICINE | Facility: CLINIC | Age: 86
End: 2021-03-04

## 2021-03-04 DIAGNOSIS — Z00.00 ROUTINE HISTORY AND PHYSICAL EXAMINATION OF ADULT: ICD-10-CM

## 2021-03-04 DIAGNOSIS — E78.5 HYPERLIPIDEMIA LDL GOAL <130: Primary | ICD-10-CM

## 2021-03-04 DIAGNOSIS — I10 HYPERTENSION GOAL BP (BLOOD PRESSURE) < 150/90: ICD-10-CM

## 2021-03-04 NOTE — TELEPHONE ENCOUNTER
Reason for call: Patient calling because she got a letter to call and schedule a wellness visit and a fasting lab appt. Scheduled patient for a wellness visit for 3/29/21 with Dr Gordon. Patient needs orders placed if a fasting lab only appt is needed.    Can we leave a detailed message: Yes     Patient  can be reached at: [phone]    Call taken at 1:30 pm  on 3/4/2021    Jane Boyd [facility]  2nd Floor  Primary Care

## 2021-03-26 DIAGNOSIS — I10 HYPERTENSION GOAL BP (BLOOD PRESSURE) < 150/90: ICD-10-CM

## 2021-03-26 LAB
ALBUMIN SERPL-MCNC: 3.7 G/DL (ref 3.4–5)
ANION GAP SERPL CALCULATED.3IONS-SCNC: 2 MMOL/L (ref 3–14)
BUN SERPL-MCNC: 15 MG/DL (ref 7–30)
CALCIUM SERPL-MCNC: 9.3 MG/DL (ref 8.5–10.1)
CHLORIDE SERPL-SCNC: 107 MMOL/L (ref 94–109)
CO2 SERPL-SCNC: 32 MMOL/L (ref 20–32)
CREAT SERPL-MCNC: 0.8 MG/DL (ref 0.52–1.04)
GFR SERPL CREATININE-BSD FRML MDRD: 63 ML/MIN/{1.73_M2}
GLUCOSE SERPL-MCNC: 94 MG/DL (ref 70–99)
PHOSPHATE SERPL-MCNC: 3.3 MG/DL (ref 2.5–4.5)
POTASSIUM SERPL-SCNC: 3.9 MMOL/L (ref 3.4–5.3)
SODIUM SERPL-SCNC: 141 MMOL/L (ref 133–144)

## 2021-03-26 PROCEDURE — 36415 COLL VENOUS BLD VENIPUNCTURE: CPT | Performed by: FAMILY MEDICINE

## 2021-03-26 PROCEDURE — 80069 RENAL FUNCTION PANEL: CPT | Performed by: FAMILY MEDICINE

## 2021-03-29 ENCOUNTER — TELEPHONE (OUTPATIENT)
Dept: OTHER | Facility: CLINIC | Age: 86
End: 2021-03-29

## 2021-03-29 ENCOUNTER — OFFICE VISIT (OUTPATIENT)
Dept: FAMILY MEDICINE | Facility: CLINIC | Age: 86
End: 2021-03-29
Payer: COMMERCIAL

## 2021-03-29 VITALS
BODY MASS INDEX: 24.55 KG/M2 | DIASTOLIC BLOOD PRESSURE: 76 MMHG | OXYGEN SATURATION: 97 % | SYSTOLIC BLOOD PRESSURE: 135 MMHG | WEIGHT: 130 LBS | HEIGHT: 61 IN | HEART RATE: 91 BPM | TEMPERATURE: 97.7 F

## 2021-03-29 DIAGNOSIS — Z00.00 ENCOUNTER FOR MEDICARE ANNUAL WELLNESS EXAM: Primary | ICD-10-CM

## 2021-03-29 DIAGNOSIS — I48.91 ATRIAL FIBRILLATION, UNSPECIFIED TYPE (H): ICD-10-CM

## 2021-03-29 DIAGNOSIS — I10 HYPERTENSION GOAL BP (BLOOD PRESSURE) < 150/90: ICD-10-CM

## 2021-03-29 DIAGNOSIS — I83.892 VARICOSE VEINS OF LEFT LOWER EXTREMITY WITH OTHER COMPLICATIONS: ICD-10-CM

## 2021-03-29 PROCEDURE — 99397 PER PM REEVAL EST PAT 65+ YR: CPT | Performed by: FAMILY MEDICINE

## 2021-03-29 PROCEDURE — 99214 OFFICE O/P EST MOD 30 MIN: CPT | Mod: 25 | Performed by: FAMILY MEDICINE

## 2021-03-29 RX ORDER — AMLODIPINE BESYLATE 5 MG/1
5 TABLET ORAL DAILY
Qty: 90 TABLET | Refills: 1 | Status: SHIPPED | OUTPATIENT
Start: 2021-03-29 | End: 2021-10-04

## 2021-03-29 RX ORDER — POTASSIUM CHLORIDE 750 MG/1
10 TABLET, EXTENDED RELEASE ORAL 2 TIMES DAILY
Qty: 180 TABLET | Refills: 1 | Status: SHIPPED | OUTPATIENT
Start: 2021-03-29 | End: 2021-10-26

## 2021-03-29 RX ORDER — FUROSEMIDE 40 MG
40 TABLET ORAL EVERY MORNING
Qty: 90 TABLET | Refills: 1 | Status: SHIPPED | OUTPATIENT
Start: 2021-03-29 | End: 2021-06-28

## 2021-03-29 ASSESSMENT — MIFFLIN-ST. JEOR: SCORE: 922.06

## 2021-03-29 NOTE — PATIENT INSTRUCTIONS
Patient Education   Personalized Prevention Plan  You are due for the preventive services outlined below.  Your care team is available to assist you in scheduling these services.  If you have already completed any of these items, please share that information with your care team to update in your medical record.  Health Maintenance Due   Topic Date Due     Zoster (Shingles) Vaccine (2 of 3) 09/08/2010     Eye Exam  01/01/2020     PHQ-2  01/01/2021     COVID-19 Vaccine (2 - Pfizer 2-dose series) 04/01/2021

## 2021-03-29 NOTE — TELEPHONE ENCOUNTER
KM Drake at [phone] to schedule NEW CONSULT with [facility] at next available.    Note:   Referred by Dr. Gordon for worsening peripheral edema and varicose veins.     Rachele LIZAMA

## 2021-03-29 NOTE — TELEPHONE ENCOUNTER
Note: Referred by Dr. Gordon for worsening peripheral edema and varicose veins.    Please arrange for new patient consult with Vascular Medicine at next available.  No imaging prior to exam.    Per OV note with Dr. Gordon 03/29/21 worsening peripheral edema and peripheral pulses strong.    Per Ortho visit note 1/28/21 Palpable 2+ dp pulses.        Juhi Smith RN BSN  [facility]  [phone]

## 2021-03-29 NOTE — TELEPHONE ENCOUNTER
Patient's daughter is calling about a referral from Dr. Gordon. Referral states that patient needs to be seen for varicose veins but patient's daughter states that she also has weeping wounds. Patient's daughter, Vidal, will schedule appointment and she can be reached at [phone]

## 2021-03-29 NOTE — PROGRESS NOTES
"  SUBJECTIVE:   Halle Saucedo is a 89 year old female who presents for Preventive Visit.    Follow-up htn, hypothyroid and high cholesterol. Seeing cardiology for a.fib and history angioplasty s/p MI. Outside blood pressure readings average 130/70. No chest pain or shortness of breath.   No falls. Lives with daughter. Mild Memory concerns at times- not brother patient too much. Not driving.   No nausea, vomiting or diarrhea or black/bloody stools.   Had covid shot. No urine changes or hematuria.    Emotionally doing ok.  Sleep overall ok. Exercise - active.   No history dvt.   Eye exam 1 month ago - ok.   No milk. Taking vitaminD.   Patient has been advised of split billing requirements and indicates understanding: Yes  Are you in the first 12 months of your Medicare Part B coverage?  No    Physical Health:    In general, how would you rate your overall physical health? good    Outside of work, how many days during the week do you exercise? none    Outside of work, approximately how many minutes a day do you exercise?not applicable    If you drink alcohol do you typically have >3 drinks per day or >7 drinks per week? No    Do you usually eat at least 4 servings of fruit and vegetables a day, include whole grains & fiber and avoid regularly eating high fat or \"junk\" foods? Yes    Do you have any problems taking medications regularly?  No    Do you have any side effects from medications? none    Needs assistance for the following daily activities: transportation    Which of the following safety concerns are present in your home?  none identified     Hearing impairment: Yes, Feel that people are mumbling or not speaking clearly.    Need to ask people to speak up or repeat themselves.    In the past 6 months, have you been bothered by leaking of urine? no    Mental Health:    In general, how would you rate your overall mental or emotional health? good  PHQ-2 Score:      Do you feel safe in your environment? Yes    Have " you ever done Advance Care Planning? (For example, a Health Directive, POLST, or a discussion with a medical provider or your loved ones about your wishes): Yes, advance care planning is on file.    Additional concerns to address?  YES, look at leg    Fall risk:  Fallen 2 or more times in the past year?: No  Any fall with injury in the past year?: No    Cognitive Screenin) Repeat 3 items (Leader, Season, Table)    2) Clock draw: NORMAL  3) 3 item recall: Recalls NO objects   Results: 0 items recalled: PROBABLE COGNITIVE IMPAIRMENT, **INFORM PROVIDER**    Mini-CogTM Copyright S Melisa. Licensed by the author for use in [facility]; reprinted with permission (soramona@.[facility]). All rights reserved.      Do you have sleep apnea, excessive snoring or daytime drowsiness?: no        Reviewed and updated as needed this visit by clinical staff  Tobacco  Allergies  Meds   Med Hx  Surg Hx  Fam Hx  Soc Hx        Reviewed and updated as needed this visit by Provider                Social History     Tobacco Use     Smoking status: Never Smoker     Smokeless tobacco: Never Used   Substance Use Topics     Alcohol use: Yes     Comment: rare                           Current providers sharing in care for this patient include:   Patient Care Team:  Santino Gordon MD as PCP - General  Santino Gordon MD as Assigned PCP  Scar Maharaj MD as Assigned Musculoskeletal Provider    The following health maintenance items are reviewed in Epic and correct as of today:  Health Maintenance   Topic Date Due     ZOSTER IMMUNIZATION (2 of 3) 2010     EYE EXAM  2020     COVID-19 Vaccine (2 - Pfizer 2-dose series) 2021     ADVANCE CARE PLANNING  2021     FALL RISK ASSESSMENT  2021     MEDICARE ANNUAL WELLNESS VISIT  2022     DTAP/TDAP/TD IMMUNIZATION (3 - Td) 2022     PHQ-2  Completed     INFLUENZA VACCINE  Completed     Pneumococcal Vaccine: Pediatrics (0 to 5 Years) and  "At-Risk Patients (6 to 64 Years)  Completed     Pneumococcal Vaccine: 65+ Years  Completed     IPV IMMUNIZATION  Aged Out     MENINGITIS IMMUNIZATION  Aged Out     HEPATITIS B IMMUNIZATION  Aged Out     Labs reviewed in EPIC      ROS:  All other ROS negative    OBJECTIVE:   /76   Pulse 91   Temp 97.7  F (36.5  C) (Tympanic)   Ht 1.549 m (5' 1\")   Wt 59 kg (130 lb)   SpO2 97%   BMI 24.56 kg/m    EXAM:   GENERAL: healthy, alert and no distress  EYES: Eyes grossly normal to inspection, PERRL and conjunctivae and sclerae normal  HENT: ear canals and TM's normal, nose and mouth without ulcers or lesions  NECK: no adenopathy, no asymmetry, masses, or scars and thyroid normal to palpation  RESP: lungs clear to auscultation - no rales, rhonchi or wheezes  CV: regular rate and rhythm, normal S1 S2, no S3 or S4, no murmur, click or rub, no peripheral edema and peripheral pulses strong  ABDOMEN: soft, nontender, no hepatosplenomegaly, no masses and bowel sounds normal  MS: no gross musculoskeletal defects noted, no edema  MS: bilateral peripheral edema and varicose veins. right > left   SKIN: no suspicious lesions or rashes  NEURO: Normal strength and tone, mentation intact and speech normal  PSYCH: mentation appears normal, affect normal/bright  LYMPH: no cervical, supraclavicular, axillary adenopathy    ASSESSMENT / PLAN:   ASSESSMENT / PLAN:  (Z00.00) Encounter for Medicare annual wellness exam  (primary encounter diagnosis)  Comment: generally healthy and normal exam except some memory concerns  Plan: Reviewed self mole/breast check handout.  Continue vitaminD. Patient not driving and getting good cares at home. Recheck in 6 months      (I10) Hypertension goal BP (blood pressure) < 150/90  Comment: stable  Plan: furosemide (LASIX) 40 MG tablet, amLODIPine         (NORVASC) 5 MG tablet, potassium chloride ER         (K-TAB/KLOR-CON) 10 MEQ CR tablet        Continue self-monitor. Recheck in 6 months  Sooner if " "worse.     (I83.892) Varicose veins of left lower extremity with other complications  Comment: worsening edema  Plan: VASCULAR SURGERY REFERRAL        Ace/coban wrap done. Continue stockings. Can see surgery if worse.    (I48.91) Atrial fibrillation, unspecified type (H)  Comment: stable  Plan: continue meds/blood thinners.     }    Patient has been advised of split billing requirements and indicates understanding: Yes    COUNSELING:  Reviewed preventive health counseling, as reflected in patient instructions       Regular exercise       Healthy diet/nutrition       Vision screening       Hearing screening       Dental care       Bladder control       Osteoporosis prevention/bone health    Estimated body mass index is 24.56 kg/m  as calculated from the following:    Height as of this encounter: 1.549 m (5' 1\").    Weight as of this encounter: 59 kg (130 lb).        She reports that she has never smoked. She has never used smokeless tobacco.    Appropriate preventive services were discussed with this patient, including applicable screening as appropriate for cardiovascular disease, diabetes, osteopenia/osteoporosis, and glaucoma.  As appropriate for age/gender, discussed screening for colorectal cancer, prostate cancer, breast cancer, and cervical cancer. Checklist reviewing preventive services available has been given to the patient.    Reviewed patients plan of care and provided an AVS. The Intermediate Care Plan ( asthma action plan, low back pain action plan, and migraine action plan) for Halle meets the Care Plan requirement. This Care Plan has been established and reviewed with the Patient.    Counseling Resources:  ATP IV Guidelines  Pooled Cohorts Equation Calculator  Breast Cancer Risk Calculator  BRCA-Related Cancer Risk Assessment: FHS-7 Tool  FRAX Risk Assessment  ICSI Preventive Guidelines  Dietary Guidelines for Americans, 2010  USDA's MyPlate  ASA Prophylaxis  Lung CA Screening    Santino Gordon, " MD [facility]

## 2021-04-05 ENCOUNTER — OFFICE VISIT (OUTPATIENT)
Dept: OPTOMETRY | Facility: CLINIC | Age: 86
End: 2021-04-05
Payer: COMMERCIAL

## 2021-04-05 ENCOUNTER — OFFICE VISIT (OUTPATIENT)
Dept: FAMILY MEDICINE | Facility: CLINIC | Age: 86
End: 2021-04-05
Payer: COMMERCIAL

## 2021-04-05 VITALS
BODY MASS INDEX: 25.13 KG/M2 | WEIGHT: 133 LBS | OXYGEN SATURATION: 97 % | RESPIRATION RATE: 16 BRPM | TEMPERATURE: 97.5 F | SYSTOLIC BLOOD PRESSURE: 154 MMHG | DIASTOLIC BLOOD PRESSURE: 69 MMHG | HEART RATE: 64 BPM

## 2021-04-05 DIAGNOSIS — H16.141 PUNCTATE KERATITIS OF RIGHT EYE: ICD-10-CM

## 2021-04-05 DIAGNOSIS — S05.11XA PERIORBITAL CONTUSION OF RIGHT EYE, INITIAL ENCOUNTER: Primary | ICD-10-CM

## 2021-04-05 DIAGNOSIS — S00.11XA CONTUSION OF RIGHT EYELID AND PERIOCULAR AREA, INITIAL ENCOUNTER: Primary | ICD-10-CM

## 2021-04-05 PROCEDURE — 99213 OFFICE O/P EST LOW 20 MIN: CPT | Performed by: FAMILY MEDICINE

## 2021-04-05 PROCEDURE — 99202 OFFICE O/P NEW SF 15 MIN: CPT | Performed by: OPTOMETRIST

## 2021-04-05 ASSESSMENT — TONOMETRY
IOP_METHOD: APPLANATION
OS_IOP_MMHG: 14
OD_IOP_MMHG: 14

## 2021-04-05 ASSESSMENT — VISUAL ACUITY
OD_CC: 20/40
CORRECTION_TYPE: GLASSES
OS_CC: 20/40
METHOD: SNELLEN - LINEAR
OD_CC+: -3
OS_CC+: +1

## 2021-04-05 NOTE — PROGRESS NOTES
"Chief Complaint   Patient presents with     Eye Problem Right Eye     Per Dr Kenyon     Chief Complaint(s) and History of Present Illness(es)     Eye Problem Right Eye     Laterality: right eye    Onset: sudden    Onset: 2 days ago    Location: central vision    Quality: hazy    Frequency: constantly    Course: Redness of right lower lid     Associated symptoms: dryness and tearing    Comments: Per Dr Kenyon              Comments     Patient woke up yesterday with a red lower lid, no pain. She said that she didn't realize that it was like this until her daughter pointed it out. She does think that it has affected her vision. She thinks that it's a bit \"foggy\". She said that yesterday the eye seemed to be real watery, but not as much today.  She said that Dr Kenyon wanted her pressure checked.                Saw Pedro Jung MD at [facility] for many years    No recent change in eye medications   Used Lunigan x many years and Rhopressa x  1.5 yrs      Taking a blood thinner - has not noticed any bruises lately  No history of recent eye trauma         Redness of right lower lid started yesterday   -has no itch or pain in that area    She wears glasses         [facility] Assistant      See Review Of Systems       Medical, surgical and family histories reviewed and updated 4/5/2021.         OBJECTIVE: See Ophthalmology exam    ASSESSMENT:    ICD-10-CM    1. Contusion of right eyelid and periocular area, initial encounter  S00.11XA    2. Punctate keratitis of right eye  H16.141       PLAN:    Patient Instructions   Use artificial tears like Optive omega in vials three times a day for 2 weeks.  Monitor right lower lid - bruise should resolve in 2 weeks    If change in vision seek care with [facility] or [facility] at [place] .    Julissa Vital O.D.  [facility]  [address] [place] [address]  [phone]         "

## 2021-04-05 NOTE — LETTER
"    4/5/2021         RE: Halle Saucedo  [address] [id number]        Dear Colleague,    Thank you for referring your patient, Halle Sauceod, to the [facility]. Please see a copy of my visit note below.    Chief Complaint   Patient presents with     Eye Problem Right Eye     Per Dr Kenyon     Chief Complaint(s) and History of Present Illness(es)     Eye Problem Right Eye     Laterality: right eye    Onset: sudden    Onset: 2 days ago    Location: central vision    Quality: hazy    Frequency: constantly    Course: Redness of right lower lid     Associated symptoms: dryness and tearing    Comments: Per Dr Kenyon              Comments     Patient woke up yesterday with a red lower lid, no pain. She said that she didn't realize that it was like this until her daughter pointed it out. She does think that it has affected her vision. She thinks that it's a bit \"foggy\". She said that yesterday the eye seemed to be real watery, but not as much today.  She said that Dr Kenyon wanted her pressure checked.                Saw Pedro Jung MD at [facility] for many years    No recent change in eye medications   Used Lunigan x many years and Rhopressa x  1.5 yrs      Taking a blood thinner - has not noticed any bruises lately  No history of recent eye trauma         Redness of right lower lid started yesterday   -has no itch or pain in that area    She wears glasses         [facility] Optometric Assistant      See Review Of Systems       Medical, surgical and family histories reviewed and updated 4/5/2021.         OBJECTIVE: See Ophthalmology exam    ASSESSMENT:    ICD-10-CM    1. Contusion of right eyelid and periocular area, initial encounter  S00.11XA    2. Punctate keratitis of right eye  H16.141       PLAN:    Patient Instructions   Use artificial tears like Optive omega in vials three times a day for 2 weeks.  Monitor right lower lid - bruise should resolve in 2 " weeks    If change in vision seek care with [facility] or [facility] at [place] .    Julissa Vital O.D.  [facility]  [address] [place] [address] [phone]             Again, thank you for allowing me to participate in the care of your patient.        Sincerely,        Julissa Vital, OD

## 2021-04-05 NOTE — NURSING NOTE
"Chief Complaint   Patient presents with     Eye Problem       Initial BP (!) 154/69   Pulse 64   Temp 97.5  F (36.4  C) (Oral)   Resp 16   Wt 60.3 kg (133 lb)   SpO2 97%   BMI 25.13 kg/m   Estimated body mass index is 25.13 kg/m  as calculated from the following:    Height as of 3/29/21: 1.549 m (5' 1\").    Weight as of this encounter: 60.3 kg (133 lb).  Medication Reconciliation: complete  Leonel Joseph CMA    "

## 2021-04-16 ENCOUNTER — OFFICE VISIT (OUTPATIENT)
Dept: OTHER | Facility: CLINIC | Age: 86
End: 2021-04-16
Attending: INTERNAL MEDICINE
Payer: COMMERCIAL

## 2021-04-16 VITALS
BODY MASS INDEX: 26.11 KG/M2 | HEIGHT: 60 IN | HEART RATE: 61 BPM | OXYGEN SATURATION: 99 % | DIASTOLIC BLOOD PRESSURE: 80 MMHG | SYSTOLIC BLOOD PRESSURE: 172 MMHG | WEIGHT: 133 LBS | RESPIRATION RATE: 16 BRPM

## 2021-04-16 DIAGNOSIS — I83.892 VARICOSE VEINS OF LEFT LOWER EXTREMITY WITH OTHER COMPLICATIONS: ICD-10-CM

## 2021-04-16 PROCEDURE — 99204 OFFICE O/P NEW MOD 45 MIN: CPT | Performed by: INTERNAL MEDICINE

## 2021-04-16 PROCEDURE — G0463 HOSPITAL OUTPT CLINIC VISIT: HCPCS

## 2021-04-16 ASSESSMENT — MIFFLIN-ST. JEOR: SCORE: 919.78

## 2021-04-16 NOTE — PROGRESS NOTES
Halle Saucedo is a 89 year old female who presents for:  Chief Complaint   Patient presents with     RECHECK     nh Referred by Dr. Gordon for worsening peripheral edema and varicose veins *jj        Vitals:    Vitals:    04/16/21 1034 04/16/21 1036   BP: (!) 165/69 (!) 172/80   BP Location: Right arm Left arm   Patient Position: Chair Chair   Cuff Size: Adult Regular Adult Regular   Pulse: 61    Resp: 16    SpO2: 99%    Weight: 133 lb (60.3 kg)    Height: 5' (1.524 m)        BMI:  Estimated body mass index is 25.97 kg/m  as calculated from the following:    Height as of this encounter: 5' (1.524 m).    Weight as of this encounter: 133 lb (60.3 kg).    Pain Score:  Data Unavailable        Maritza Velasquez, [facility]

## 2021-04-16 NOTE — PROGRESS NOTES
Initial Vascular Medicine Consultation    Requesting MD:    Reason for referral: Bilateral LE edema in absence of heart failure or metabolic etiologies along with varicose veins.  CC: leg swelling    HPI: Halle Saucedo is a highly functional and cognitively alert 89 year old female w/o a VTE hx who additionally has CAD and is S/P three TERRENCE in past for the above. She has not had a CABG or other vein stripping. She is on Eliquis for AF. She has over the last several months developed bilateral LE edema Rt>> Lt with recent weeping of serous fluid from the right calf which has since subsided. She notes thi is bothersome and has not improved with knee high compression hosiery of unknown compressive degree or brand of stocking. She seeks improvement in swelling as her main goal. She denies pain in the legs. She denies tightness or tautness, and denies claudication or CLI sxs.    PAST MEDICAL HISTORY:                  Past Medical History:   Diagnosis Date     Glaucoma     left and right eyes     Other and unspecified hyperlipidemia      Unspecified essential hypertension        PAST SURGICAL HISTORY:                  Past Surgical History:   Procedure Laterality Date     CATARACT IOL, RT/LT       D & C       TONSILLECTOMY & ADENOIDECTOMY      childhood       CURRENT MEDICATIONS:                  Current Outpatient Medications   Medication Sig Dispense Refill     amLODIPine (NORVASC) 5 MG tablet Take 1 tablet (5 mg) by mouth daily for blood pressure Pharmacy ok to hold prescription until due 90 tablet 1     apixaban ANTICOAGULANT (ELIQUIS) 2.5 MG tablet Take by mouth 2 times daily       aspirin 81 MG tablet Take 81 mg by mouth daily       atorvastatin (LIPITOR) 40 MG tablet Take 1 tablet (40 mg) by mouth daily For cholesterol 90 tablet 3     furosemide (LASIX) 40 MG tablet Take 1 tablet (40 mg) by mouth every morning Pharmacy ok to hold prescription until due 90 tablet 1     losartan (COZAAR) 50 MG tablet TAKE 1 TABLET BY  MOUTH EVERY DAY FOR BLOOD PRESSURE 90 tablet 0     LUMIGAN 0.01 % SOLN Place 1 drop into both eyes At Bedtime        metoprolol (TOPROL-XL) 25 MG 24 hr tablet Take 25 mg by mouth daily       Multiple Vitamins-Minerals (MULTIVITAMIN ADULTS 50+) TABS Take 1 tablet by mouth daily       netarsudil dimesylate (RHOPRESSA) 0.02 % SOLN At Bedtime       nitroGLYcerin (NITROSTAT) 0.4 MG sublingual tablet For chest pain place 1 tablet under the tongue every 5 minutes for 3 doses. If symptoms persist 5 minutes after 1st dose call 911.       omeprazole (PRILOSEC) 20 MG DR capsule TAKE 1 CAPSULE(20 MG) BY MOUTH DAILY FOR 10 DAYS THEN AS NEEDED FOR HEARTBURN/ UPSET STOMACH 90 capsule 0     potassium chloride ER (K-TAB/KLOR-CON) 10 MEQ CR tablet Take 1 tablet (10 mEq) by mouth 2 times daily 180 tablet 1     VITAMIN D, CHOLECALCIFEROL, PO Take 2 tablets by mouth daily       vitamin E 400 UNIT capsule Take 800 Units by mouth daily         ALLERGIES:                  Allergies   Allergen Reactions     Alphagan [Brimonidine Tartrate]      Azopt [Brinzolamide]      Beta Adrenergic Blockers      Bisphosphonates      Estrogens      Gemfibrozil      Trusopt [Dorzolamide] Itching     rash     Xalatan [Latanoprost]      Zetia [Ezetimibe]      Zocor [Hmg-Coa-R Inhibitors]        SOCIAL HISTORY:                  Social History     Socioeconomic History     Marital status:      Spouse name: Not on file     Number of children: Not on file     Years of education: Not on file     Highest education level: Not on file   Occupational History     Not on file   Social Needs     Financial resource strain: Not on file     Food insecurity     Worry: Not on file     Inability: Not on file     Transportation needs     Medical: Not on file     Non-medical: Not on file   Tobacco Use     Smoking status: Never Smoker     Smokeless tobacco: Never Used   Substance and Sexual Activity     Alcohol use: Yes     Comment: rare     Drug use: No     Sexual  activity: Not Currently     Partners: Male   Lifestyle     Physical activity     Days per week: Not on file     Minutes per session: Not on file     Stress: Not on file   Relationships     Social connections     Talks on phone: Not on file     Gets together: Not on file     Attends Catholic service: Not on file     Active member of club or organization: Not on file     Attends meetings of clubs or organizations: Not on file     Relationship status: Not on file     Intimate partner violence     Fear of current or ex partner: Not on file     Emotionally abused: Not on file     Physically abused: Not on file     Forced sexual activity: Not on file   Other Topics Concern     Parent/sibling w/ CABG, MI or angioplasty before 65F 55M? Not Asked   Social History Narrative     Not on file       FAMILY HISTORY:                   Family History   Problem Relation Age of Onset     Hypertension Brother      Glaucoma Brother      Hypertension Sister      Glaucoma Sister      Hypertension Sister          Physical exam Reveals:    O/P: WNL  HEENT: WNL  NECK: No JVD, thyromegaly, or lymphadenopathy  HEART: irr irr, no murmurs, gallops, or rubs  LUNGS: CTA bilaterally without rales, wheezes, or rhonchi  GI: NABS, nondistended, nontender, soft  EXT:without cyanosis, clubbing, positive for CEAP C3 venous insufficiency (bilateral obvious varicosities w/o lipodermatosclerosis or venous ulcerations); 3 plus RLEE , 2 plus LLEE  NEURO: nonfocal  : no flank tenderness    A/P:    (I83.892) Varicose veins of left lower extremity with other complications  Comment: CEAP C 3 varicosities noted. Pathophysiology of varicose veins described to patient and daughter. RLE is larger than LLE  Plan: Check bilateral venous competency study to rule out DVT given size discrepancy, and also to anatomically verify extent of venous insuff; RTC one week later. Will likely need thigh high Sigvaris 20-30 mm Hg compression hosiery, will use a FlexiTouch pump if  that fails. If that fails, would pursue venous closure procedures.        Greater than one half the 50  minutes total spent on the pt's visit were spent providing education and counselling to the patient regarding the above matters.

## 2021-04-20 ENCOUNTER — TELEPHONE (OUTPATIENT)
Dept: OTHER | Facility: CLINIC | Age: 86
End: 2021-04-20

## 2021-04-20 NOTE — TELEPHONE ENCOUNTER
Follow up to 4/16/21    Please call daughter Parker ([phone]) to arrange for:      Bilateral venous competency ultrasound    In clinic follow up one week later.    Juhi Smith RN BSN  [facility]  [phone]

## 2021-04-20 NOTE — TELEPHONE ENCOUNTER
Patient is scheduled for her Ultrasound on 05/05/21 and follow up appointment with Dr Montaño on 05/12/21.

## 2021-05-05 ENCOUNTER — ANCILLARY PROCEDURE (OUTPATIENT)
Dept: ULTRASOUND IMAGING | Facility: CLINIC | Age: 86
End: 2021-05-05
Attending: INTERNAL MEDICINE
Payer: COMMERCIAL

## 2021-05-05 DIAGNOSIS — I83.892 VARICOSE VEINS OF LEFT LOWER EXTREMITY WITH OTHER COMPLICATIONS: ICD-10-CM

## 2021-05-05 PROCEDURE — 93970 EXTREMITY STUDY: CPT | Performed by: SURGERY

## 2021-05-12 ENCOUNTER — OFFICE VISIT (OUTPATIENT)
Dept: OTHER | Facility: CLINIC | Age: 86
End: 2021-05-12
Attending: INTERNAL MEDICINE
Payer: COMMERCIAL

## 2021-05-12 VITALS
DIASTOLIC BLOOD PRESSURE: 72 MMHG | OXYGEN SATURATION: 97 % | RESPIRATION RATE: 14 BRPM | HEART RATE: 68 BPM | BODY MASS INDEX: 26.5 KG/M2 | WEIGHT: 135 LBS | SYSTOLIC BLOOD PRESSURE: 169 MMHG | HEIGHT: 60 IN

## 2021-05-12 DIAGNOSIS — I83.892 VARICOSE VEINS OF LEFT LOWER EXTREMITY WITH OTHER COMPLICATIONS: ICD-10-CM

## 2021-05-12 PROCEDURE — 99214 OFFICE O/P EST MOD 30 MIN: CPT | Performed by: INTERNAL MEDICINE

## 2021-05-12 PROCEDURE — G0463 HOSPITAL OUTPT CLINIC VISIT: HCPCS

## 2021-05-12 ASSESSMENT — MIFFLIN-ST. JEOR: SCORE: 928.86

## 2021-05-12 NOTE — PROGRESS NOTES
Halle Saucedo is a 89 year old female who is presenting at the current time to discuss her diagnosi(es) of   .      HPI: Halle Saucedo is a highly functional and cognitively alert 89 year old female w/o a VTE hx who additionally has CAD and is S/P three TERRENCE in past for the above. She has not had a CABG or other vein stripping. She is on Eliquis for AF. She has over the last several months developed bilateral LE edema Rt>> Lt with recent weeping of serous fluid from the right calf which has since subsided. She notes thi is bothersome and has not improved with knee high compression hosiery of unknown compressive degree or brand of stocking. She seeks improvement in swelling as her main goal. She denies pain in the legs. She denies tightness or tautness, and denies claudication or CLI sxs. When last seen on 4/16/2021, she was noted to have CEAP C 3 varicosities. Pathophysiology of varicose veins was described to the patient and her daughter. Since the RLE was larger than LLE, a venous competency study was obtained to rule out DVT or SVT. This revealed  no evidence of DVT bilaterally. She did have Rt CFV, FV, PV, GSV, SSV VV incompetence as well as Lt CFV, FV , popliteal, GSV, VV incompetence.      Review Of Systems  Skin: negative  Eyes: negative  Ears/Nose/Throat: negative  Respiratory: No shortness of breath, dyspnea on exertion, cough, or hemoptysis  Cardiovascular: negative  Gastrointestinal: negative  Genitourinary: negative  Musculoskeletal: negative  Neurologic: negative  Psychiatric: negative  Hematologic/Lymphatic/Immunologic: negative  Endocrine: negative        PAST MEDICAL HISTORY:                  Past Medical History:   Diagnosis Date     Glaucoma     left and right eyes     Other and unspecified hyperlipidemia      Unspecified essential hypertension        PAST SURGICAL HISTORY:                  Past Surgical History:   Procedure Laterality Date     CATARACT IOL, RT/LT       D & C       TONSILLECTOMY &  ADENOIDECTOMY      childhood       CURRENT MEDICATIONS:                  Current Outpatient Medications   Medication Sig Dispense Refill     amLODIPine (NORVASC) 5 MG tablet Take 1 tablet (5 mg) by mouth daily for blood pressure Pharmacy ok to hold prescription until due 90 tablet 1     apixaban ANTICOAGULANT (ELIQUIS) 2.5 MG tablet Take by mouth 2 times daily       aspirin 81 MG tablet Take 81 mg by mouth daily       atorvastatin (LIPITOR) 40 MG tablet Take 1 tablet (40 mg) by mouth daily For cholesterol 90 tablet 3     furosemide (LASIX) 40 MG tablet Take 1 tablet (40 mg) by mouth every morning Pharmacy ok to hold prescription until due 90 tablet 1     losartan (COZAAR) 50 MG tablet TAKE 1 TABLET BY MOUTH EVERY DAY FOR BLOOD PRESSURE 90 tablet 0     LUMIGAN 0.01 % SOLN Place 1 drop into both eyes At Bedtime        metoprolol (TOPROL-XL) 25 MG 24 hr tablet Take 25 mg by mouth daily       Multiple Vitamins-Minerals (MULTIVITAMIN ADULTS 50+) TABS Take 1 tablet by mouth daily       netarsudil dimesylate (RHOPRESSA) 0.02 % SOLN At Bedtime       nitroGLYcerin (NITROSTAT) 0.4 MG sublingual tablet For chest pain place 1 tablet under the tongue every 5 minutes for 3 doses. If symptoms persist 5 minutes after 1st dose call 911.       omeprazole (PRILOSEC) 20 MG DR capsule TAKE 1 CAPSULE(20 MG) BY MOUTH DAILY FOR 10 DAYS THEN AS NEEDED FOR HEARTBURN/ UPSET STOMACH 90 capsule 0     potassium chloride ER (K-TAB/KLOR-CON) 10 MEQ CR tablet Take 1 tablet (10 mEq) by mouth 2 times daily 180 tablet 1     VITAMIN D, CHOLECALCIFEROL, PO Take 2 tablets by mouth daily       vitamin E 400 UNIT capsule Take 800 Units by mouth daily         ALLERGIES:                  Allergies   Allergen Reactions     Alphagan [Brimonidine Tartrate]      Azopt [Brinzolamide]      Beta Adrenergic Blockers      Bisphosphonates      Estrogens      Gemfibrozil      Trusopt [Dorzolamide] Itching     rash     Xalatan [Latanoprost]      Zetia [Ezetimibe]       Zocor [Hmg-Coa-R Inhibitors]        SOCIAL HISTORY:                  Social History     Socioeconomic History     Marital status:      Spouse name: Not on file     Number of children: Not on file     Years of education: Not on file     Highest education level: Not on file   Occupational History     Not on file   Social Needs     Financial resource strain: Not on file     Food insecurity     Worry: Not on file     Inability: Not on file     Transportation needs     Medical: Not on file     Non-medical: Not on file   Tobacco Use     Smoking status: Never Smoker     Smokeless tobacco: Never Used   Substance and Sexual Activity     Alcohol use: Yes     Comment: rare     Drug use: No     Sexual activity: Not Currently     Partners: Male   Lifestyle     Physical activity     Days per week: Not on file     Minutes per session: Not on file     Stress: Not on file   Relationships     Social connections     Talks on phone: Not on file     Gets together: Not on file     Attends Rastafari service: Not on file     Active member of club or organization: Not on file     Attends meetings of clubs or organizations: Not on file     Relationship status: Not on file     Intimate partner violence     Fear of current or ex partner: Not on file     Emotionally abused: Not on file     Physically abused: Not on file     Forced sexual activity: Not on file   Other Topics Concern     Parent/sibling w/ CABG, MI or angioplasty before 65F 55M? Not Asked   Social History Narrative     Not on file       FAMILY HISTORY:                   Family History   Problem Relation Age of Onset     Hypertension Brother      Glaucoma Brother      Hypertension Sister      Glaucoma Sister      Hypertension Sister            Physical exam Reveals:    O/P: WNL  HEENT: WNL  NECK: No JVD, thyromegaly, or lymphadenopathy  HEART: RRR, no murmurs, gallops, or rubs  LUNGS: CTA bilaterally without rales, wheezes, or rhonchi  GI: NABS, nondistended, nontender,  soft  EXT:without cyanosis, clubbing, or edema  NEURO: nonfocal  : no flank tenderness      Cinthya Ospina on 2021 12:39 PM    Name:  Halle Saucedo                                                   Patient ID: [id number]  Date: May 5, 2021                                                      : 3/8/1926  Sex: female                                                                 Examined by: MONY Ospina RVT  Age:  89 year old                                                         Reading MD: GRISELDA     INDICATION:  Varicose veins of left lower extremity with other complications.     EXAM TYPE  BILATERAL LOWER EXTREMITY VENOUS DUPLEX FOR VENOUS INSUFFICIENCY  TECHNICAL SUMMARY     A duplex ultrasound study using color flow was performed to evaluate the bilateral lower extremity veins for valvular incompetence with the patient in a steep reversed trendelenberg.      RIGHT:     The deep veins demonstrate phasic flow, compress, and respond to augmentations.  There is no evidnece of DVT.  The common femoral, femoral, and popliteal veins are incompetent and free of thrombus.      The GSV demonstrates phasic flow, compresses, and responds to augmentations from the saphenofemoral junction to the ankle with no evidence of thrombus. The greater saphenous vein measures 5.3 mm at the saphenofemoral junction, 5.0 mm in the proximal thigh,a nd 3.9 mm at the knee. The GSV is incompetent from the SFJ to the proximal calf with the greatest reflux time of 4438 milliseconds. The GSV gives rise to multiple incompetent varicose veins, the largest measures 3.7 mm off the proximal martina that courses anteromedially and has a reflux time of 2507 milliseconds.     The AASV is competent ( 1.9 mm) draining into the saphenofemoral junction.      The Giacomini vein is competent ( 2.6 mm) communicating with the small saphenous vein at the knee level.      The SSV demonstrates phasic flow, compresses, and responds to augmentations from the  popliteal space to the ankle.  No thrombus is seen. The saphenopopliteal junction is incompetent (4.3 mm). The SSV is incompetent from the the SPJ to the distal calf with a reflux time of 2310 milliseconds.  The SSV gives rise to multiple incompetent varicose veins, the largest measuring 3.3 mm off the mid calf that courses medially with a reflux time of 1699 milliseconds.     Perforators: There is no evidence of incompetent  veins at any level.      LEFT:     The deep veins demonstrate phasic flow, compress, and respond to augmentations.  There is no evidence of DVT.  The common femoral, distal femoral, and popliteal veins are incompetent and free of thrombus. The remaining deep vein segements are competent and free of thrombus.      The GSV demonstrates phasic flow, compresses, and responds to augmentations from the saphenofemoral junction to the ankle with no evidence of  thrombus. The greater saphenous vein measures 5.5 mm at the saphenofemoral junction, 4.4 mm in the proximal thigh, and 3.4 mm at the knee. The GSV is incompetent from the SFJ to the proximal calf with the greatest reflux time of 3382 milliseconds. The GSV gives rise to multiple incompetent varicose veins, the largest measures 3.6 mm off the proximal calf that courses anteromedially with a reflux time of 1518 milliseconds.      The AASV is not visualized.     The Giacomini vein is competent ( .9 mm) communicating with the small saphenous vein at the knee level.     The SSV demonstrates phasic flow, compresses, and responds to augmentations from the popliteal space to the ankle.  No reflux or thrombus is seen. The saphenopopliteal junction is absent.      Perforators: There is no evidence of incompetent  veins at any level.      FINAL SUMMARY:  1.         No evidence of bilateral lower extremity deep vein thrombus.  2.         Right common femoral, femoral, and popliteal vein incompetence.  Left common femoral, distal femoral,  and popliteal vein                      incompetence.  3.         Right greater saphenous vein incompetence.  4.         Right small saphenous vein incompetence.  5.         Right varicose vein incompetence.  6.         Left greater saphenous vein incompetence.  7.         Left varicose vein incompetence.  8.         The time of incompetence is greater than 500 milliseconds in length.                    (I83.892) Varicose veins of left lower extremity with other complications  Comment: CEAP C 3 varicosities noted. She has no evidence of DVT bilaterally. She did have Rt CFV, FV, PV, GSV, SSV VV incompetence as well as Lt CFV, FV , popliteal, GSV, VV incompetence. Pathophysiology of varicose veins described to patient and daughter. RLE is larger than LLE  Plan: Reassurance given regarding absence of   DVT given size discrepancy. Will need thigh high Sigvaris 20-30 mm Hg compression hosiery, this was prescribed. Will use a FlexiTouch pump if that fails. If that fails, would pursue venous closure procedures.    Greater than 30  minutes total spent on the pt's visit were spent reviewing imaging, chart notes, formulating above plan, and communicating this to the patient and her daughter.

## 2021-05-12 NOTE — PROGRESS NOTES
Halle Saucedo is a 89 year old female who presents for:  Chief Complaint   Patient presents with     CORDELIA Wolf Comp done 05/05/21 -f/u to 04/16/21 visit - *LMB 04/20/21        Vitals:    Vitals:    05/12/21 1034 05/12/21 1036   BP: (!) 167/67 (!) 169/72   BP Location: Right arm Left arm   Patient Position: Chair Chair   Cuff Size: Adult Regular Adult Regular   Pulse: 68    Resp: 14    SpO2: 97%    Weight: 135 lb (61.2 kg)    Height: 5' (1.524 m)        BMI:  Estimated body mass index is 26.37 kg/m  as calculated from the following:    Height as of this encounter: 5' (1.524 m).    Weight as of this encounter: 135 lb (61.2 kg).    Pain Score:  Data Unavailable        Maritza Velasquez, CMA

## 2021-05-24 DIAGNOSIS — I10 HYPERTENSION GOAL BP (BLOOD PRESSURE) < 150/90: ICD-10-CM

## 2021-05-25 RX ORDER — LOSARTAN POTASSIUM 50 MG/1
TABLET ORAL
Qty: 90 TABLET | Refills: 1 | Status: SHIPPED | OUTPATIENT
Start: 2021-05-25 | End: 2021-11-29

## 2021-05-25 NOTE — TELEPHONE ENCOUNTER
Routing refill request to provider for review/approval because:  BP Readings from Last 3 Encounters:   05/12/21 (!) 169/72   04/16/21 (!) 172/80   04/05/21 (!) 154/69         Stefania YEN, RN

## 2021-06-28 DIAGNOSIS — I10 HYPERTENSION GOAL BP (BLOOD PRESSURE) < 150/90: ICD-10-CM

## 2021-06-28 RX ORDER — FUROSEMIDE 40 MG
TABLET ORAL
Qty: 90 TABLET | Refills: 1 | Status: SHIPPED | OUTPATIENT
Start: 2021-06-28 | End: 2022-01-27

## 2021-08-06 ENCOUNTER — OFFICE VISIT (OUTPATIENT)
Dept: OTHER | Facility: CLINIC | Age: 86
End: 2021-08-06
Attending: INTERNAL MEDICINE
Payer: COMMERCIAL

## 2021-08-06 VITALS
BODY MASS INDEX: 25.32 KG/M2 | RESPIRATION RATE: 16 BRPM | DIASTOLIC BLOOD PRESSURE: 90 MMHG | OXYGEN SATURATION: 98 % | WEIGHT: 129 LBS | HEART RATE: 66 BPM | HEIGHT: 60 IN | SYSTOLIC BLOOD PRESSURE: 175 MMHG

## 2021-08-06 DIAGNOSIS — I83.892 VARICOSE VEINS OF LEFT LOWER EXTREMITY WITH OTHER COMPLICATIONS: ICD-10-CM

## 2021-08-06 PROCEDURE — 99214 OFFICE O/P EST MOD 30 MIN: CPT | Performed by: INTERNAL MEDICINE

## 2021-08-06 PROCEDURE — G0463 HOSPITAL OUTPT CLINIC VISIT: HCPCS

## 2021-08-06 ASSESSMENT — MIFFLIN-ST. JEOR: SCORE: 901.64

## 2021-08-06 NOTE — PROGRESS NOTES
[facility]        Patient is here for a follow up  to discuss about varicose veins on the lower legs      Patient's condition is stable.    BP (!) 175/90 (BP Location: Left arm, Patient Position: Chair, Cuff Size: Adult Regular)   Pulse 66   Resp 16   Ht 5' (1.524 m)   Wt 129 lb (58.5 kg)   SpO2 98%   BMI 25.19 kg/m      The provider has been notified that the patient has concerns of about wearing her compression socks.     Questions patient would like addressed today are: N/A.    Refills are needed: No    Has homecare services and agency name:  [facility]

## 2021-08-06 NOTE — PROGRESS NOTES
Halle Saucedo is a 89 year old female who is presenting at the current time to discuss her diagnosi(es) of     Varicose veins of left lower extremity with other complications  .      HPI: Halle Saucedo is a highly functional and cognitively alert 89 year old female w/o a VTE hx who additionally has CAD and is S/P three TERRENCE in past for the above. She has not had a CABG or other vein stripping. She is on Eliquis for AF. She has over the last six months developed bilateral LE edema Rt>> Lt with recent weeping of serous fluid from the right calf which has since subsided. She notes this is bothersome and had not improved with knee high compression hosiery of unknown compressive degree or brand of stocking. She sought improvement in swelling as her main goal. She denies pain in the legs. She denies tightness or tautness, and denies claudication or CLI sxs. When last seen, her exam was consistent with CEAP C3 varicosities.  No evidence of bilateral lower extremity deep vein thrombus on vein comp study. She did have right common femoral, femoral, and popliteal vein incompetence, as well as left common femoral, distal femoral, and popliteal vein incompetence. Superficially, she had right greater saphenous vein incompetence, right small saphenous vein incompetence and right varicose vein incompetence. On the left she had greater saphenous vein and varicose vein incompetence. We prescribed thigh high Sigvaris 20-30 mm Hg compression hosiery. She presents today for f/u noting things have markedly improved with compression hosiery use.      Review Of Systems  Skin: negative  Eyes: negative  Ears/Nose/Throat: negative  Respiratory: No shortness of breath, dyspnea on exertion, cough, or hemoptysis  Cardiovascular: negative  Gastrointestinal: negative  Genitourinary: negative  Musculoskeletal: negative  Neurologic: negative  Psychiatric: negative  Hematologic/Lymphatic/Immunologic: negative  Endocrine: negative        PAST MEDICAL  HISTORY:                  Past Medical History:   Diagnosis Date     Glaucoma     left and right eyes     Other and unspecified hyperlipidemia      Unspecified essential hypertension        PAST SURGICAL HISTORY:                  Past Surgical History:   Procedure Laterality Date     CATARACT IOL, RT/LT       D & C       TONSILLECTOMY & ADENOIDECTOMY      childhood       CURRENT MEDICATIONS:                  Current Outpatient Medications   Medication Sig Dispense Refill     amLODIPine (NORVASC) 5 MG tablet Take 1 tablet (5 mg) by mouth daily for blood pressure Pharmacy ok to hold prescription until due 90 tablet 1     apixaban ANTICOAGULANT (ELIQUIS) 2.5 MG tablet Take by mouth 2 times daily       aspirin 81 MG tablet Take 81 mg by mouth daily       atorvastatin (LIPITOR) 40 MG tablet Take 1 tablet (40 mg) by mouth daily For cholesterol 90 tablet 3     furosemide (LASIX) 40 MG tablet TAKE 1 TABLET BY MOUTH EVERY MORNING 90 tablet 1     losartan (COZAAR) 50 MG tablet TAKE 1 TABLET BY MOUTH EVERY DAY FOR BLOOD PRESSURE 90 tablet 1     LUMIGAN 0.01 % SOLN Place 1 drop into both eyes At Bedtime        metoprolol (TOPROL-XL) 25 MG 24 hr tablet Take 25 mg by mouth daily       Multiple Vitamins-Minerals (MULTIVITAMIN ADULTS 50+) TABS Take 1 tablet by mouth daily       netarsudil dimesylate (RHOPRESSA) 0.02 % SOLN At Bedtime       nitroGLYcerin (NITROSTAT) 0.4 MG sublingual tablet For chest pain place 1 tablet under the tongue every 5 minutes for 3 doses. If symptoms persist 5 minutes after 1st dose call 911.       omeprazole (PRILOSEC) 20 MG DR capsule TAKE 1 CAPSULE(20 MG) BY MOUTH DAILY FOR 10 DAYS THEN AS NEEDED FOR HEARTBURN/ UPSET STOMACH 90 capsule 0     potassium chloride ER (K-TAB/KLOR-CON) 10 MEQ CR tablet Take 1 tablet (10 mEq) by mouth 2 times daily 180 tablet 1     VITAMIN D, CHOLECALCIFEROL, PO Take 2 tablets by mouth daily       vitamin E 400 UNIT capsule Take 800 Units by mouth daily         ALLERGIES:                   Allergies   Allergen Reactions     Alphagan [Brimonidine Tartrate]      Azopt [Brinzolamide]      Beta Adrenergic Blockers      Bisphosphonates      Estrogens      Gemfibrozil      Trusopt [Dorzolamide] Itching     rash     Xalatan [Latanoprost]      Zetia [Ezetimibe]      Zocor [Hmg-Coa-R Inhibitors]        SOCIAL HISTORY:                  Social History     Socioeconomic History     Marital status:      Spouse name: Not on file     Number of children: Not on file     Years of education: Not on file     Highest education level: Not on file   Occupational History     Not on file   Tobacco Use     Smoking status: Never Smoker     Smokeless tobacco: Never Used   Substance and Sexual Activity     Alcohol use: Yes     Comment: rare     Drug use: No     Sexual activity: Not Currently     Partners: Male   Other Topics Concern     Parent/sibling w/ CABG, MI or angioplasty before 65F 55M? Not Asked   Social History Narrative     Not on file     Social Determinants of Health     Financial Resource Strain:      Difficulty of Paying Living Expenses:    Food Insecurity:      Worried About Running Out of Food in the Last Year:      Ran Out of Food in the Last Year:    Transportation Needs:      Lack of Transportation (Medical):      Lack of Transportation (Non-Medical):    Physical Activity:      Days of Exercise per Week:      Minutes of Exercise per Session:    Stress:      Feeling of Stress :    Social Connections:      Frequency of Communication with Friends and Family:      Frequency of Social Gatherings with Friends and Family:      Attends Sabianism Services:      Active Member of Clubs or Organizations:      Attends Club or Organization Meetings:      Marital Status:    Intimate Partner Violence:      Fear of Current or Ex-Partner:      Emotionally Abused:      Physically Abused:      Sexually Abused:        FAMILY HISTORY:                   Family History   Problem Relation Age of Onset      Hypertension Brother      Glaucoma Brother      Hypertension Sister      Glaucoma Sister      Hypertension Sister          Physical exam Reveals:    O/P: WNL  HEENT: WNL  NECK: No JVD, thyromegaly, or lymphadenopathy  HEART: RRR, no murmurs, gallops, or rubs  LUNGS: CTA bilaterally without rales, wheezes, or rhonchi  GI: NABS, nondistended, nontender, soft  EXT:without cyanosis, clubbing, or edema  NEURO: nonfocal  : no flank tenderness        Cinthya Ospina on 2021 12:39 PM    Name:  Halle Saucedo                                                   Patient ID: [id number]  Date: May 5, 2021                                                      : 3/8/1926  Sex: female                                                                 Examined by: MONY Ospina RVT  Age:  89 year old                                                         Reading MD: GRISELDA     INDICATION:  Varicose veins of left lower extremity with other complications.     EXAM TYPE  BILATERAL LOWER EXTREMITY VENOUS DUPLEX FOR VENOUS INSUFFICIENCY  TECHNICAL SUMMARY     A duplex ultrasound study using color flow was performed to evaluate the bilateral lower extremity veins for valvular incompetence with the patient in a steep reversed trendelenberg.      RIGHT:     The deep veins demonstrate phasic flow, compress, and respond to augmentations.  There is no evidnece of DVT.  The common femoral, femoral, and popliteal veins are incompetent and free of thrombus.      The GSV demonstrates phasic flow, compresses, and responds to augmentations from the saphenofemoral junction to the ankle with no evidence of thrombus. The greater saphenous vein measures 5.3 mm at the saphenofemoral junction, 5.0 mm in the proximal thigh,a nd 3.9 mm at the knee. The GSV is incompetent from the SFJ to the proximal calf with the greatest reflux time of 4438 milliseconds. The GSV gives rise to multiple incompetent varicose veins, the largest measures 3.7 mm off the proximal martina  that courses anteromedially and has a reflux time of 2507 milliseconds.     The AASV is competent ( 1.9 mm) draining into the saphenofemoral junction.      The Giacomini vein is competent ( 2.6 mm) communicating with the small saphenous vein at the knee level.      The SSV demonstrates phasic flow, compresses, and responds to augmentations from the popliteal space to the ankle.  No thrombus is seen. The saphenopopliteal junction is incompetent (4.3 mm). The SSV is incompetent from the the SPJ to the distal calf with a reflux time of 2310 milliseconds.  The SSV gives rise to multiple incompetent varicose veins, the largest measuring 3.3 mm off the mid calf that courses medially with a reflux time of 1699 milliseconds.     Perforators: There is no evidence of incompetent  veins at any level.      LEFT:     The deep veins demonstrate phasic flow, compress, and respond to augmentations.  There is no evidence of DVT.  The common femoral, distal femoral, and popliteal veins are incompetent and free of thrombus. The remaining deep vein segements are competent and free of thrombus.      The GSV demonstrates phasic flow, compresses, and responds to augmentations from the saphenofemoral junction to the ankle with no evidence of  thrombus. The greater saphenous vein measures 5.5 mm at the saphenofemoral junction, 4.4 mm in the proximal thigh, and 3.4 mm at the knee. The GSV is incompetent from the SFJ to the proximal calf with the greatest reflux time of 3382 milliseconds. The GSV gives rise to multiple incompetent varicose veins, the largest measures 3.6 mm off the proximal calf that courses anteromedially with a reflux time of 1518 milliseconds.      The AASV is not visualized.     The Giacomini vein is competent ( .9 mm) communicating with the small saphenous vein at the knee level.     The SSV demonstrates phasic flow, compresses, and responds to augmentations from the popliteal space to the ankle.  No reflux or  thrombus is seen. The saphenopopliteal junction is absent.      Perforators: There is no evidence of incompetent  veins at any level.      FINAL SUMMARY:  1.         No evidence of bilateral lower extremity deep vein thrombus.  2.         Right common femoral, femoral, and popliteal vein incompetence.  Left common femoral, distal femoral, and popliteal vein                      incompetence.  3.         Right greater saphenous vein incompetence.  4.         Right small saphenous vein incompetence.  5.         Right varicose vein incompetence.  6.         Left greater saphenous vein incompetence.  7.         Left varicose vein incompetence.  8.         The time of incompetence is greater than 500 milliseconds in length.           A/P:             (I83.892) Varicose veins of left lower extremity with other complications  Comment: CEAP C 3 varicosities noted. Pathophysiology of varicose veins described to patient and daughter. RLE is larger than LLE  Plan: Continue thigh high Sigvaris 20-30 mm Hg compression hosiery, will use a FlexiTouch pump if that fails. If that fails, would pursue venous closure procedures. RTC prn

## 2021-08-25 NOTE — PROGRESS NOTES
Assessment & Plan       Diarrhea, unspecified type  Happens sporadically so ? If it is something she is eating  Will have her stop supplements but continue rx meds, keep food diary to see if can find potential food that may cause this  If not able, will then need referral to GI. CBC is normal so not anemia.   - **Comprehensive metabolic panel FUTURE 2mo; Future  - **CBC with platelets FUTURE 2mo; Future  - ESR: Erythrocyte sedimentation rate; Future  - CRP, inflammation; Future  - **Comprehensive metabolic panel FUTURE 2mo  - **CBC with platelets FUTURE 2mo  - ESR: Erythrocyte sedimentation rate  - CRP, inflammation    Gastroesophageal reflux disease with esophagitis without hemorrhage  Refill as is working well for pt  - omeprazole (PRILOSEC) 20 MG DR capsule; TAKE 1 CAPSULE(20 MG) BY MOUTH DAILY FOR 10 DAYS THEN AS NEEDED FOR HEARTBURN/ UPSET STOMACH               No follow-ups on file.    Linda Matamoros MD  [facility]    Vasu Neri is a 89 year old who presents for the following health issues  accompanied by her daughter:    HPI     Diarrhea  Onset/Duration: off and on for a few months   Description:       Lasts for 2 days and then ok for about 1 month   Progression of Symptoms: intermittent  Accompanying signs and symptoms:       Fever: no       Nausea/Vomiting: no       Abdominal pain: YES- at the time when she has diarrhea       Weight loss: no       Episodes of constipation: no  History   Ill contacts: no  Recent use of antibiotics: no  Recent travels: no  Recent medication-new or changes(Rx or OTC): no  Precipitating or alleviating factors: None  Therapies tried and outcome: none    Pt with diarrhea and stomach pain  Has had the abdominal pain for years and has had work up and treated with ppi    Now with diarrhea for couple of years  Diarrhea occurring a few times per year but now more recently 2 x in past month  It is not bloody but sometimes is black?  When the  diarrhea occurs it occurs once in the morning  Has had accidents.   No weight loss or fevers.   Get pain in abdomen when this occurs but then pain quickly resolves once the diarrhea has passed                 Review of Systems   Constitutional, HEENT, cardiovascular, pulmonary, gi and gu systems are negative, except as otherwise noted.      Objective    BP (!) 184/69   Pulse 58   Temp 97.9  F (36.6  C) (Oral)   Resp 17   Ht 1.524 m (5')   Wt 57.6 kg (127 lb)   SpO2 100%   BMI 24.80 kg/m    Body mass index is 24.8 kg/m .  Physical Exam   GENERAL: healthy, alert and no distress  RESP: lungs clear to auscultation - no rales, rhonchi or wheezes  CV: regular rate and rhythm, normal S1 S2, no S3 or S4, no murmur, click or rub, no peripheral edema and peripheral pulses strong  ABDOMEN: soft, nontender, no hepatosplenomegaly, no masses and bowel sounds normal  MS: no gross musculoskeletal defects noted, no edema    Results for orders placed or performed in visit on 08/26/21 (from the past 24 hour(s))   **CBC with platelets FUTURE 2mo   Result Value Ref Range    WBC Count 10.7 4.0 - 11.0 10e3/uL    RBC Count 4.62 3.80 - 5.20 10e6/uL    Hemoglobin 14.6 11.7 - 15.7 g/dL    Hematocrit 44.1 35.0 - 47.0 %    MCV 96 78 - 100 fL    MCH 31.6 26.5 - 33.0 pg    MCHC 33.1 31.5 - 36.5 g/dL    RDW 13.5 10.0 - 15.0 %    Platelet Count 457 (H) 150 - 450 10e3/uL   ESR: Erythrocyte sedimentation rate   Result Value Ref Range    Erythrocyte Sedimentation Rate 8 0 - 30 mm/hr

## 2021-08-26 ENCOUNTER — OFFICE VISIT (OUTPATIENT)
Dept: FAMILY MEDICINE | Facility: CLINIC | Age: 86
End: 2021-08-26
Payer: COMMERCIAL

## 2021-08-26 VITALS
HEIGHT: 60 IN | DIASTOLIC BLOOD PRESSURE: 69 MMHG | WEIGHT: 127 LBS | OXYGEN SATURATION: 100 % | TEMPERATURE: 97.9 F | HEART RATE: 58 BPM | BODY MASS INDEX: 24.94 KG/M2 | SYSTOLIC BLOOD PRESSURE: 184 MMHG | RESPIRATION RATE: 17 BRPM

## 2021-08-26 DIAGNOSIS — R19.7 DIARRHEA, UNSPECIFIED TYPE: Primary | ICD-10-CM

## 2021-08-26 DIAGNOSIS — K21.00 GASTROESOPHAGEAL REFLUX DISEASE WITH ESOPHAGITIS WITHOUT HEMORRHAGE: ICD-10-CM

## 2021-08-26 LAB
ALBUMIN SERPL-MCNC: 4 G/DL (ref 3.4–5)
ALP SERPL-CCNC: 77 U/L (ref 40–150)
ALT SERPL W P-5'-P-CCNC: 29 U/L (ref 0–50)
ANION GAP SERPL CALCULATED.3IONS-SCNC: 3 MMOL/L (ref 3–14)
AST SERPL W P-5'-P-CCNC: 24 U/L (ref 0–45)
BILIRUB SERPL-MCNC: 0.8 MG/DL (ref 0.2–1.3)
BUN SERPL-MCNC: 15 MG/DL (ref 7–30)
CALCIUM SERPL-MCNC: 9.1 MG/DL (ref 8.5–10.1)
CHLORIDE BLD-SCNC: 105 MMOL/L (ref 94–109)
CO2 SERPL-SCNC: 31 MMOL/L (ref 20–32)
CREAT SERPL-MCNC: 0.76 MG/DL (ref 0.52–1.04)
CRP SERPL-MCNC: <2.9 MG/L (ref 0–8)
ERYTHROCYTE [DISTWIDTH] IN BLOOD BY AUTOMATED COUNT: 13.5 % (ref 10–15)
ERYTHROCYTE [SEDIMENTATION RATE] IN BLOOD BY WESTERGREN METHOD: 8 MM/HR (ref 0–30)
GFR SERPL CREATININE-BSD FRML MDRD: 67 ML/MIN/1.73M2
GLUCOSE BLD-MCNC: 97 MG/DL (ref 70–99)
HCT VFR BLD AUTO: 44.1 % (ref 35–47)
HGB BLD-MCNC: 14.6 G/DL (ref 11.7–15.7)
MCH RBC QN AUTO: 31.6 PG (ref 26.5–33)
MCHC RBC AUTO-ENTMCNC: 33.1 G/DL (ref 31.5–36.5)
MCV RBC AUTO: 96 FL (ref 78–100)
PLATELET # BLD AUTO: 457 10E3/UL (ref 150–450)
POTASSIUM BLD-SCNC: 4.2 MMOL/L (ref 3.4–5.3)
PROT SERPL-MCNC: 6.8 G/DL (ref 6.8–8.8)
RBC # BLD AUTO: 4.62 10E6/UL (ref 3.8–5.2)
SODIUM SERPL-SCNC: 139 MMOL/L (ref 133–144)
WBC # BLD AUTO: 10.7 10E3/UL (ref 4–11)

## 2021-08-26 PROCEDURE — 85027 COMPLETE CBC AUTOMATED: CPT | Performed by: FAMILY MEDICINE

## 2021-08-26 PROCEDURE — 80053 COMPREHEN METABOLIC PANEL: CPT | Performed by: FAMILY MEDICINE

## 2021-08-26 PROCEDURE — 86140 C-REACTIVE PROTEIN: CPT | Performed by: FAMILY MEDICINE

## 2021-08-26 PROCEDURE — 99214 OFFICE O/P EST MOD 30 MIN: CPT | Performed by: FAMILY MEDICINE

## 2021-08-26 PROCEDURE — 85652 RBC SED RATE AUTOMATED: CPT | Performed by: FAMILY MEDICINE

## 2021-08-26 PROCEDURE — 36415 COLL VENOUS BLD VENIPUNCTURE: CPT | Performed by: FAMILY MEDICINE

## 2021-08-26 ASSESSMENT — MIFFLIN-ST. JEOR: SCORE: 892.57

## 2021-08-31 DIAGNOSIS — E78.5 HYPERLIPIDEMIA LDL GOAL <130: ICD-10-CM

## 2021-08-31 RX ORDER — ATORVASTATIN CALCIUM 40 MG/1
TABLET, FILM COATED ORAL
Qty: 90 TABLET | Refills: 1 | Status: SHIPPED | OUTPATIENT
Start: 2021-08-31 | End: 2022-01-27

## 2021-10-04 DIAGNOSIS — I10 HYPERTENSION GOAL BP (BLOOD PRESSURE) < 150/90: ICD-10-CM

## 2021-10-04 RX ORDER — AMLODIPINE BESYLATE 5 MG/1
TABLET ORAL
Qty: 90 TABLET | Refills: 0 | Status: SHIPPED | OUTPATIENT
Start: 2021-10-04 | End: 2022-01-11

## 2021-10-23 ENCOUNTER — HEALTH MAINTENANCE LETTER (OUTPATIENT)
Age: 86
End: 2021-10-23

## 2021-10-26 DIAGNOSIS — I10 HYPERTENSION GOAL BP (BLOOD PRESSURE) < 150/90: ICD-10-CM

## 2021-10-26 RX ORDER — POTASSIUM CHLORIDE 750 MG/1
TABLET, EXTENDED RELEASE ORAL
Qty: 180 TABLET | Refills: 1 | Status: SHIPPED | OUTPATIENT
Start: 2021-10-26 | End: 2022-05-02

## 2021-11-29 DIAGNOSIS — I10 HYPERTENSION GOAL BP (BLOOD PRESSURE) < 150/90: ICD-10-CM

## 2021-11-29 RX ORDER — LOSARTAN POTASSIUM 50 MG/1
TABLET ORAL
Qty: 90 TABLET | Refills: 0 | Status: SHIPPED | OUTPATIENT
Start: 2021-11-29 | End: 2022-01-27

## 2021-11-29 NOTE — TELEPHONE ENCOUNTER
Routing refill request to provider for review/approval because:  BP Readings from Last 3 Encounters:   08/26/21 (!) 184/69   08/06/21 (!) 175/90   05/12/21 (!) 169/72     Keysha YEN, RN

## 2022-01-10 DIAGNOSIS — I10 HYPERTENSION GOAL BP (BLOOD PRESSURE) < 150/90: ICD-10-CM

## 2022-01-11 ENCOUNTER — TELEPHONE (OUTPATIENT)
Dept: FAMILY MEDICINE | Facility: CLINIC | Age: 87
End: 2022-01-11
Payer: COMMERCIAL

## 2022-01-11 RX ORDER — AMLODIPINE BESYLATE 5 MG/1
TABLET ORAL
Qty: 90 TABLET | Refills: 1 | Status: SHIPPED | OUTPATIENT
Start: 2022-01-11 | End: 2022-07-05

## 2022-01-11 NOTE — TELEPHONE ENCOUNTER
BP Readings from Last 3 Encounters:   08/26/21 (!) 184/69   08/06/21 (!) 175/90   05/12/21 (!) 169/72     Emily Frank BSN, RN

## 2022-01-11 NOTE — TELEPHONE ENCOUNTER
Reason for Call:  Form, our goal is to have forms completed with 72 hours, however, some forms may require a visit or additional information.    Type of letter, form or note:  handicap - Patient's parking permit is up in February and she needs a new one filled out.    please call daughter when ready for .  [phone].        Who is the form from?: Patient    Where did the form come from: Patient or family brought in     Please call daughter when ready for .  What clinic location was the form placed at?: [place]    Where the form was placed: Given to physician    What number is listed as a contact on the form?: Application for Disability Parking Certificate - please call daughter when ready for .  [phone].           Additional comments: placed form in provider's box to complete and route back to TC.    Call taken on 1/11/2022 at 3:44 PM by Evelyne Camacho

## 2022-01-13 NOTE — TELEPHONE ENCOUNTER
Patient overdue for appointment/med check and labs. Please help set-up I next month and we can do handicap form at time. Santino Gordon MD

## 2022-01-27 ENCOUNTER — OFFICE VISIT (OUTPATIENT)
Dept: FAMILY MEDICINE | Facility: CLINIC | Age: 87
End: 2022-01-27
Payer: COMMERCIAL

## 2022-01-27 VITALS
OXYGEN SATURATION: 96 % | DIASTOLIC BLOOD PRESSURE: 71 MMHG | HEART RATE: 91 BPM | RESPIRATION RATE: 20 BRPM | BODY MASS INDEX: 24.02 KG/M2 | WEIGHT: 123 LBS | TEMPERATURE: 96.5 F | SYSTOLIC BLOOD PRESSURE: 124 MMHG

## 2022-01-27 DIAGNOSIS — G89.29 CHRONIC PAIN OF BOTH KNEES: ICD-10-CM

## 2022-01-27 DIAGNOSIS — I21.3 ST ELEVATION MYOCARDIAL INFARCTION (STEMI), UNSPECIFIED ARTERY (H): ICD-10-CM

## 2022-01-27 DIAGNOSIS — I73.9 PVD (PERIPHERAL VASCULAR DISEASE) (H): ICD-10-CM

## 2022-01-27 DIAGNOSIS — I48.91 ATRIAL FIBRILLATION, UNSPECIFIED TYPE (H): ICD-10-CM

## 2022-01-27 DIAGNOSIS — M25.562 CHRONIC PAIN OF BOTH KNEES: ICD-10-CM

## 2022-01-27 DIAGNOSIS — E78.5 HYPERLIPIDEMIA LDL GOAL <130: ICD-10-CM

## 2022-01-27 DIAGNOSIS — I10 HYPERTENSION GOAL BP (BLOOD PRESSURE) < 150/90: Primary | ICD-10-CM

## 2022-01-27 DIAGNOSIS — I25.110 CORONARY ARTERY DISEASE INVOLVING NATIVE CORONARY ARTERY OF NATIVE HEART WITH UNSTABLE ANGINA PECTORIS (H): ICD-10-CM

## 2022-01-27 DIAGNOSIS — M25.561 CHRONIC PAIN OF BOTH KNEES: ICD-10-CM

## 2022-01-27 LAB
ALBUMIN SERPL-MCNC: 3.8 G/DL (ref 3.4–5)
ANION GAP SERPL CALCULATED.3IONS-SCNC: 7 MMOL/L (ref 3–14)
BUN SERPL-MCNC: 21 MG/DL (ref 7–30)
CALCIUM SERPL-MCNC: 9.9 MG/DL (ref 8.5–10.1)
CHLORIDE BLD-SCNC: 105 MMOL/L (ref 94–109)
CO2 SERPL-SCNC: 30 MMOL/L (ref 20–32)
CREAT SERPL-MCNC: 0.79 MG/DL (ref 0.52–1.04)
GFR SERPL CREATININE-BSD FRML MDRD: 69 ML/MIN/1.73M2
GLUCOSE BLD-MCNC: 115 MG/DL (ref 70–99)
PHOSPHATE SERPL-MCNC: 4.1 MG/DL (ref 2.5–4.5)
POTASSIUM BLD-SCNC: 3.7 MMOL/L (ref 3.4–5.3)
SODIUM SERPL-SCNC: 142 MMOL/L (ref 133–144)

## 2022-01-27 PROCEDURE — 99214 OFFICE O/P EST MOD 30 MIN: CPT | Performed by: FAMILY MEDICINE

## 2022-01-27 PROCEDURE — 80069 RENAL FUNCTION PANEL: CPT | Performed by: FAMILY MEDICINE

## 2022-01-27 PROCEDURE — 36415 COLL VENOUS BLD VENIPUNCTURE: CPT | Performed by: FAMILY MEDICINE

## 2022-01-27 RX ORDER — LOSARTAN POTASSIUM 50 MG/1
TABLET ORAL
Qty: 90 TABLET | Refills: 1 | Status: SHIPPED | OUTPATIENT
Start: 2022-01-27 | End: 2022-08-19

## 2022-01-27 RX ORDER — ATORVASTATIN CALCIUM 40 MG/1
TABLET, FILM COATED ORAL
Qty: 90 TABLET | Refills: 1 | Status: SHIPPED | OUTPATIENT
Start: 2022-01-27 | End: 2022-08-19

## 2022-01-27 RX ORDER — FUROSEMIDE 40 MG
40 TABLET ORAL EVERY MORNING
Qty: 90 TABLET | Refills: 1 | Status: SHIPPED | OUTPATIENT
Start: 2022-01-27 | End: 2022-08-19

## 2022-01-27 NOTE — PROGRESS NOTES
SUBJECTIVE:  Halle Saucedo, a 89 year old female scheduled an appointment to discuss the following issues:  Follow-up htn, hypothyroid and high cholesterol. Seeing cardiology for a.fib and history angioplasty s/p MI  Outside 130/70. No chest pain or shortness of breath. No nitrog needed.   No black/blooody stools. Appetite ok. No urine changes.   Trigger finger. Emotionally doing ok.   Memory some loss of short term. Lives with daughter. No falls.   Sleep overall ok.   Medical, social, surgical, and family histories reviewed.    ROS:  All other ROS negative.     OBJECTIVE:  /71   Pulse 91   Temp (!) 96.5  F (35.8  C) (Tympanic)   Resp 20   Wt 55.8 kg (123 lb)   SpO2 96%   BMI 24.02 kg/m     EXAM:  GENERAL APPEARANCE: healthy, alert and no distress  EYES: EOMI,  PERRL  HENT: ear canals and TM's normal and nose and mouth without ulcers or lesions  NECK: no adenopathy, no asymmetry, masses, or scars and thyroid normal to palpation  RESP: lungs clear to auscultation - no rales, rhonchi or wheezes  CV: regular rates and rhythm, normal S1 S2, no S3 or S4 and no murmur, click or rub -  ABDOMEN:  soft, nontender, no HSM or masses and bowel sounds normal  MS: extremities normal- no gross deformities noted, no evidence of inflammation in joints, FROM in all extremities.  NEURO: Normal strength and tone, sensory exam grossly normal, mentation intact and speech normal  PSYCH: mentation appears normal and affect normal/bright    ASSESSMENT / PLAN:  (I10) Hypertension goal BP (blood pressure) < 150/90  (primary encounter diagnosis)  Comment: stable  Plan: Renal panel, losartan (COZAAR) 50 MG tablet,         furosemide (LASIX) 40 MG tablet        Continue self-monitor. Recheck in 6 months  Sooner if worse.     (M25.561,  M25.562,  G89.29) Chronic pain of both knee  Plan: follow-up ortho. Handicap form done. Patient doesn't drive.     (E78.5) Hyperlipidemia LDL goal <130  Comment: stalbe  Plan: atorvastatin (LIPITOR) 40  MG tablet        Recheck in 6 months      (I25.110) Coronary artery disease involving native coronary artery of native heart with unstable angina pectoris (H)  Comment: stable  Plan: per cardiology    (I73.9) PVD (peripheral vascular disease) (H)  Comment: stable  Plan: exercise.    (I48.91) Atrial fibrillation, unspecified type (H)  Comment: stable  Plan: continue meds.     (I21.3) ST elevation myocardial infarction (STEMI), unspecified artery (H)    Plan: chest pain to er.       Increase protein/fat in diet and monitor weight. Recheck in 6 months  Sooner if worse/new issues.   Santino Gordon MD

## 2022-05-31 ENCOUNTER — TELEPHONE (OUTPATIENT)
Dept: FAMILY MEDICINE | Facility: CLINIC | Age: 87
End: 2022-05-31

## 2022-05-31 ENCOUNTER — OFFICE VISIT (OUTPATIENT)
Dept: FAMILY MEDICINE | Facility: CLINIC | Age: 87
End: 2022-05-31
Payer: COMMERCIAL

## 2022-05-31 VITALS
DIASTOLIC BLOOD PRESSURE: 80 MMHG | WEIGHT: 126 LBS | OXYGEN SATURATION: 97 % | BODY MASS INDEX: 24.74 KG/M2 | RESPIRATION RATE: 17 BRPM | SYSTOLIC BLOOD PRESSURE: 178 MMHG | HEART RATE: 68 BPM | TEMPERATURE: 98 F | HEIGHT: 60 IN

## 2022-05-31 DIAGNOSIS — R19.7 DIARRHEA, UNSPECIFIED TYPE: Primary | ICD-10-CM

## 2022-05-31 PROCEDURE — 99213 OFFICE O/P EST LOW 20 MIN: CPT | Performed by: FAMILY MEDICINE

## 2022-05-31 ASSESSMENT — PAIN SCALES - GENERAL: PAINLEVEL: NO PAIN (0)

## 2022-05-31 NOTE — PROGRESS NOTES
Assessment & Plan     Diarrhea, unspecified type  Referral to GI for consult and probable colonoscopy  - Adult Gastro Ref - Consult Only; Future                 No follow-ups on file.    Linda Matamoros MD  [facility] SERGIO Neri is a 89 year old who presents for the following health issues  accompanied by her daughter.    History of Present Illness       Reason for visit:  Chronic sudden diarrhea    She eats 0-1 servings of fruits and vegetables daily.She consumes 0 sweetened beverage(s) daily.She exercises with enough effort to increase her heart rate 9 or less minutes per day.  She exercises with enough effort to increase her heart rate 3 or less days per week.   She is taking medications regularly.     Pt seen 9 months ago for diarrhea. Symptoms are persisting.   Had labwork evaluation and recommended follow-up with GI  Other daughter manages her [employer] messages so daughter here today unaware that she was recommended to see GI  Referral sent. They will call if they prefer [facility] due to convenience        Review of Systems   Constitutional, HEENT, cardiovascular, pulmonary, gi and gu systems are negative, except as otherwise noted.      Objective    BP (!) 178/80   Pulse 68   Temp 98  F (36.7  C) (Oral)   Resp 17   Ht 1.524 m (5')   Wt 57.2 kg (126 lb)   LMP  (LMP Unknown)   SpO2 97%   BMI 24.61 kg/m    Body mass index is 24.61 kg/m .  Physical Exam   GENERAL: healthy, alert and no distress    No results found for this or any previous visit (from the past 24 hour(s)).

## 2022-05-31 NOTE — TELEPHONE ENCOUNTER
Patient's daughter called to pass along information to Dr. Matamoros.    Patient is able to be seen at [facility] on Thursday, it is a telephone visit.    The referral that has been done for [facility], needs to be faxed to their clinic asap to be seen.    Routing to both provider and AN TC to please fax referral.    Cally Landers RN, [facility]

## 2022-05-31 NOTE — TELEPHONE ENCOUNTER
I have faxed the referral to [facility] [phone].  Evelyne MALONEY - Levi

## 2022-06-04 ENCOUNTER — HEALTH MAINTENANCE LETTER (OUTPATIENT)
Age: 87
End: 2022-06-04

## 2022-06-07 NOTE — MR AVS SNAPSHOT
"              After Visit Summary   2017    Halle Saucedo    MRN: [id number]           Patient Information     Date Of Birth          3/8/1926        Visit Information        Provider Department      2017 1:00 PM Griselda Ridley MD [facility]        Today's Diagnoses     Fatigue, unspecified type    -  1    Chest heaviness        Cough        Persistent cough for 3 weeks or longer           Follow-ups after your visit        Who to contact     If you have questions or need follow up information about today's clinic visit or your schedule please contact [facility] directly at [phone].  Normal or non-critical lab and imaging results will be communicated to you by "[employer]hart, letter or phone within 4 business days after the clinic has received the results. If you do not hear from us within 7 days, please contact the clinic through "[employer]hart or phone. If you have a critical or abnormal lab result, we will notify you by phone as soon as possible.  Submit refill requests through [employer] or call your pharmacy and they will forward the refill request to us. Please allow 3 business days for your refill to be completed.          Additional Information About Your Visit        MyChart Information     [employer] lets you send messages to your doctor, view your test results, renew your prescriptions, schedule appointments and more. To sign up, go to [email or web address] . Click on \"Log in\" on the left side of the screen, which will take you to the Welcome page. Then click on \"Sign up Now\" on the right side of the page.     You will be asked to enter the access code listed below, as well as some personal information. Please follow the directions to create your username and password.     Your access code is: [email or web address]  Expires: 2017  3:12 PM     Your access code will  in 90 days. If you need help or a new code, please call your [facility] or [phone].        Care " EveryWhere ID     This is your Care EveryWhere ID. This could be used by other organizations to access your [facility] medical records  [id number]        Your Vitals Were     Pulse Temperature Respirations Pulse Oximetry Breastfeeding? BMI (Body Mass Index)    78 97.4  F (36.3  C) (Oral) 15 100% No 26.76 kg/m2       Blood Pressure from Last 3 Encounters:   08/16/17 136/54   04/07/17 145/76   02/13/17 (!) 180/100    Weight from Last 3 Encounters:   08/16/17 137 lb (62.1 kg)   04/07/17 138 lb (62.6 kg)   02/13/17 138 lb (62.6 kg)              We Performed the Following     EKG 12-lead complete w/read - Clinics     XR Chest 2 Views          Today's Medication Changes          These changes are accurate as of: 8/16/17  3:12 PM.  If you have any questions, ask your nurse or doctor.               Start taking these medicines.        Dose/Directions    levofloxacin 500 MG tablet   Commonly known as:  LEVAQUIN   Used for:  Cough, Fatigue, unspecified type   Started by:  Griselda Ridley MD        Dose:  500 mg   Take 1 tablet (500 mg) by mouth daily   Quantity:  7 tablet   Refills:  0            Where to get your medicines      These medications were sent to [facility] [address], [facility] [address]  [facility] [place] [address]     Phone:  [phone]     levofloxacin 500 MG tablet                Primary Care Provider Office Phone # Fax #    Santino Gordon [phone]       [address] [place] [address]        Equal Access to Services     [facility] : Hadii ginny reddy hadasho Sojackeline, waaxda luqadaha, qaybta kaalmada jai nam. So [facility] [phone].    ATENCIÓN: Si habla español, tiene a reza disposición servicios gratuitos de asistencia lingüística. Llame al [phone].    We comply with applicable federal civil rights laws and [place] laws. We do not discriminate on the basis of race, color,  Notified of +CTA - active contrast extravasation in duodenum  s/p empiric GDA embolization 6/6 PM, now off AC; remains on ASA 81  requiring blood product support  +melena/maroon stool  CRRT initiated    discussed with NSCU team and Renal  ok to give Reglan 5 mg IV x 1 dose to help clear blood from UGI tract  arranging for repeat bedside EGD with possible/attempt at control of bleeding    Clinically with worsening neurologic exam  NSCU attending awaiting family decision re: GOC; family contemplating comfort measures  Await family decision   Prognosis remains guarded in this critically ill patient    Nicolas Coleman PA-C    [facility]  [phone]  After hours and weekend coverage [phone]      ACC: [id number] EXAM:  CT ANGIO ABD PELV (W)AW IC                        PROCEDURE DATE:  06/07/2022      INTERPRETATION:  CLINICAL INFORMATION: Hemorrhagic shock,   gastrointestinal bleeding.    COMPARISON: CT abdomen and pelvis 4/2/2022.      FINDINGS:  LOWER CHEST: Trace right and small left pleural effusions with adjacent   passive atelectasis. Scattered calcified granulomas at the lung bases.   Coronary artery calcification. Aortic valvular and mitral annular   calcification. Central venous catheter tip terminates in the right   atrium. Hypoattenuation of cardiac blood pool consistent with anemia.    LIVER: Within normal limits.  BILE DUCTS: Normal caliber.  GALLBLADDER: Layering sludge versus vicarious excretion.  SPLEEN: Within normal limits.  PANCREAS: Within normal limits.  ADRENALS: Within normal limits.  KIDNEYS/URETERS: No hydronephrosis or nephrolithiasis. Unchanged left   greater than right perinephric stranding.    BLADDER: Minimally distended.  REPRODUCTIVE ORGANS: Prostate within normal limits.    BOWEL: High attenuation intraluminal material is noted in the distal   descending and proximal horizontal segment of the duodenum consistent   with active bleeding (6:67-6:70). Enteric tube tip in stomach. :   Diverticulosis without diverticulitis. Again noted segment of   non-obstructed sigmoid colon within a large right inguinal hernia No   bowel obstruction. Appendix is not visualized. No evidence of   inflammation in the pericecal region.  PERITONEUM: No ascites.  VESSELS: Extensive atherosclerotic disease with near complete occlusion   of the mid SMA. There is apparent occlusion of the visualized left   superficial femoral artery. (6:170). Status post gastroduodenal artery   embolization.  RETROPERITONEUM/LYMPH NODES: No lymphadenopathy.  ABDOMINAL WALL: Bilateral inguinal hernias, the right contains segment of   nonobstructed sigmoid colon as above.  BONES: Degenerativechanges.    IMPRESSION:    Contrast extravasation into the distal descending and proximal horizontal   segments of the duodenum consistent with active bleeding.    Status post embolization of the GDA.    Extensive atherosclerotic disease as above.    Small left and trace right pleural effusions.    These findings were discussed with Dr. Thomas at 6/7/2022 3:09 PM by Dr. Darling with read back confirmation.    --- End of Report --- national origin, age, disability sex, sexual orientation or gender identity.            Thank you!     Thank you for choosing [facility]  for your care. Our goal is always to provide you with excellent care. Hearing back from our patients is one way we can continue to improve our services. Please take a few minutes to complete the written survey that you may receive in the mail after your visit with us. Thank you!             Your Updated Medication List - Protect others around you: Learn how to safely use, store and throw away your medicines at www.disposemymeds.org.          This list is accurate as of: 8/16/17  3:12 PM.  Always use your most recent med list.                   Brand Name Dispense Instructions for use Diagnosis    aspirin 81 MG tablet      Take 2 tablets by mouth daily.        furosemide 20 MG tablet    LASIX    90 tablet    Take 1 tablet (20 mg) by mouth daily Pharmacy ok to hold prescription until due    Hypertension goal BP (blood pressure) < 150/90       levofloxacin 500 MG tablet    LEVAQUIN    7 tablet    Take 1 tablet (500 mg) by mouth daily    Cough, Fatigue, unspecified type       losartan-hydrochlorothiazide 50-12.5 MG per tablet    HYZAAR    180 tablet    For blood pressure take one tab BID Pharmacy ok to hold prescription until due    Hypertension goal BP (blood pressure) < 150/90       LUMIGAN 0.01 % Soln   Generic drug:  bimatoprost           MULTI-VITAMIN PO      1 tab daily        omeprazole 20 MG CR capsule    priLOSEC    30 capsule    Take 1 capsule (20 mg) by mouth daily x10 days then as needed for heartburn/upset stomach    Gastritis       potassium chloride 10 MEQ tablet    K-TAB,KLOR-CON    180 tablet    Two tabs daily Pharmacy ok to hold prescription until due    Hypertension goal BP (blood pressure) < 150/90       rosuvastatin 10 MG tablet    CRESTOR    90 tablet    Take 1 tablet (10 mg) by mouth daily For cholesterol Pharmacy ok to hold prescription until due     Hyperlipidemia LDL goal <130       VITAMIN D PO      2 tabs daily        VITAMIN E      400mg 2 tabs daily

## 2022-07-03 DIAGNOSIS — I10 HYPERTENSION GOAL BP (BLOOD PRESSURE) < 150/90: ICD-10-CM

## 2022-07-05 RX ORDER — AMLODIPINE BESYLATE 5 MG/1
TABLET ORAL
Qty: 90 TABLET | Refills: 1 | Status: SHIPPED | OUTPATIENT
Start: 2022-07-05 | End: 2022-12-14

## 2022-07-14 ENCOUNTER — TRANSFERRED RECORDS (OUTPATIENT)
Dept: HEALTH INFORMATION MANAGEMENT | Facility: CLINIC | Age: 87
End: 2022-07-14

## 2022-07-19 DIAGNOSIS — I48.91 ATRIAL FIBRILLATION, UNSPECIFIED TYPE (H): Primary | ICD-10-CM

## 2022-07-20 ENCOUNTER — TELEPHONE (OUTPATIENT)
Dept: FAMILY MEDICINE | Facility: CLINIC | Age: 87
End: 2022-07-20

## 2022-07-20 NOTE — TELEPHONE ENCOUNTER
Patient daughter Dianne trying to get prescription for Eliquis refilled. Per reviewing chart, [facility] [place] [facility] is refilling the Eliquis. Advised patient to reach out to [facility] for further refills on medication for patient.    Gilda Navarro RN

## 2022-07-21 RX ORDER — APIXABAN 2.5 MG/1
TABLET, FILM COATED ORAL
Qty: 180 TABLET | Refills: 0 | Status: SHIPPED | OUTPATIENT
Start: 2022-07-21 | End: 2023-01-20

## 2022-07-21 NOTE — TELEPHONE ENCOUNTER
md appointment needed in next month please for med check and wellness exam if due. Santino Gordon MD

## 2022-07-21 NOTE — TELEPHONE ENCOUNTER
Patient is scheduled. Patient also had medication refilled through cardiology, so no longer needs Dr. Gordon to refill.    Future Appointments   Date Time Provider Department Center   8/19/2022 12:30 PM Santino Gordon MD [facility]     Bernadette Schneider,     [facility]

## 2022-08-12 ENCOUNTER — TELEPHONE (OUTPATIENT)
Dept: OTHER | Facility: CLINIC | Age: 87
End: 2022-08-12

## 2022-08-12 NOTE — TELEPHONE ENCOUNTER
Patient needs to be scheduled for in person follow up with Dr. Montaño to discuss next step.     Appointment note: Follow up for varicose veins- compression not helping. Need to discuss further intervention.    Zeinab VELAZQUEZ, RN    [facility]  Office: [phone]  Fax: [phone]

## 2022-08-12 NOTE — TELEPHONE ENCOUNTER
[facility]     Who is the name of the provider?:  Dr Montaño       What is the location you see this provider at?:  [place]       Reason for call:  Pt's daughter (Vidal) called to schedule an appt for her mom - Pt is having trouble with fluid seeping from her leg (s). Pt has been wearing her compression stockings and about a month ago this started. Per LOV 08/6/21 Pt to RTC pm    Contact name: Vidal       Contact number: [phone]      Additional Information: Any Imaging/labs prior to appt?        :

## 2022-08-15 NOTE — TELEPHONE ENCOUNTER
Left voicemail with instructions for patient to call back to schedule their appointment(s)    August 15, 2022 , 1:14 PM

## 2022-08-15 NOTE — TELEPHONE ENCOUNTER
Future Appointments   Date Time Provider Department Center   8/30/2022 12:10 PM Bar Montaño MD [facility]

## 2022-08-18 NOTE — PROGRESS NOTES
"SUBJECTIVE:   Halle Saucedo is a 89 year old female who presents for Preventive Visit.    Follow-up htn, hypothyroid and high cholesterol. Seeing cardiology for a.fib and history angioplasty s/p MI. Had echo last week.   Seen GI for diarrhea and resolved with diet and fiber.   Lives with daughter. No longer going to [place].   No nausea, vomiting or abdominal pain. No chest pain or shortness of breath.   Outside blood pressure readings high 130's. No urine changes or hematuria.   Emotionally doing ok. Sleep overall ok. Appetite ok. Pain - limited. Eye exam - in past year -q6months.   No falls. Good support system.   Some memory issues. No driving.    Using cane. Seeing orthopedist for leg pain. Using vick stocking.   Patient has been advised of split billing requirements and indicates understanding: Yes  Are you in the first 12 months of your Medicare coverage?  No    Healthy Habits:     In general, how would you rate your overall health?  Good    Frequency of exercise:  None    Do you usually eat at least 4 servings of fruit and vegetables a day, include whole grains    & fiber and avoid regularly eating high fat or \"junk\" foods?  No    Taking medications regularly:  Yes    Medication side effects:  None    Ability to successfully perform activities of daily living:  Telephone requires assistance, transportation requires assistance and shopping requires assistance    Home Safety:  Lack of grab bars in the bathroom    Hearing Impairment:  Difficulty following a conversation in a noisy restaurant or crowded room, feel that people are mumbling or not speaking clearly, difficulty following dialogue in the theater, difficult to understand a speaker at a public meeting or Zoroastrian service, need to ask people to speak up or repeat themselves, difficulty understanding soft or whispered speech and difficulty understanding speech on the telephone    In the past 6 months, have you been bothered by leaking of urine?  No    In " general, how would you rate your overall mental or emotional health?  Excellent      PHQ-2 Total Score: 0    Additional concerns today:  No    Do you feel safe in your environment? Yes    Have you ever done Advance Care Planning? (For example, a Health Directive, POLST, or a discussion with a medical provider or your loved ones about your wishes): No, advance care planning information given to patient to review.  Advanced care planning was discussed at today's visit.       Fall risk  Fallen 2 or more times in the past year?: No  Any fall with injury in the past year?: No    Cognitive Screening   1) Repeat 3 items (Leader, Season, Table)    2) Clock draw: NORMAL  3) 3 item recall: Recalls NO objects   Results: 0 items recalled: PROBABLE COGNITIVE IMPAIRMENT, **INFORM PROVIDER**    Mini-CogTM Copyright ANGELINA Vincent. Licensed by the author for use in [facility]; reprinted with permission (rell@[facility]). All rights reserved.      Do you have sleep apnea, excessive snoring or daytime drowsiness?: no    Reviewed and updated as needed this visit by clinical staff   Tobacco  Allergies  Meds   Med Hx  Surg Hx  Fam Hx  Soc Hx          Reviewed and updated as needed this visit by Provider                   Social History     Tobacco Use     Smoking status: Never Smoker     Smokeless tobacco: Never Used   Substance Use Topics     Alcohol use: Yes     Comment: rare         Alcohol Use 8/19/2022   Prescreen: >3 drinks/day or >7 drinks/week? No             The following health maintenance items are reviewed in Epic and correct as of today:  Health Maintenance Due   Topic Date Due     ANNUAL REVIEW OF HM ORDERS  Never done     ZOSTER IMMUNIZATION (2 of 3) 09/08/2010     EYE EXAM  01/01/2020     Lab work is in process        Review of Systems   Constitutional: Negative for chills and fever.   HENT: Positive for hearing loss. Negative for congestion, ear pain and sore throat.    Eyes: Negative for pain and visual  disturbance.   Respiratory: Positive for shortness of breath. Negative for cough.    Cardiovascular: Positive for peripheral edema. Negative for chest pain and palpitations.   Gastrointestinal: Positive for heartburn. Negative for abdominal pain, constipation, diarrhea, hematochezia and nausea.   Breasts:  Negative for tenderness, breast mass and discharge.   Genitourinary: Positive for urgency. Negative for dysuria, frequency, genital sores, hematuria, pelvic pain, vaginal bleeding and vaginal discharge.   Musculoskeletal: Positive for arthralgias. Negative for joint swelling and myalgias.   Skin: Negative for rash.   Neurological: Negative for dizziness, weakness, headaches and paresthesias.   Psychiatric/Behavioral: Negative for mood changes. The patient is not nervous/anxious.          OBJECTIVE:   /81   Pulse 62   Temp 97.9  F (36.6  C) (Oral)   Wt 56.2 kg (124 lb)   LMP  (LMP Unknown)   SpO2 97%   BMI 24.22 kg/m     Physical Exam  GENERAL: healthy, alert and no distress  EYES: Eyes grossly normal to inspection, PERRL and conjunctivae and sclerae normal  HENT: ear canals and TM's normal, nose and mouth without ulcers or lesions  NECK: no adenopathy, no asymmetry, masses, or scars and thyroid normal to palpation  RESP: lungs clear to auscultation - no rales, rhonchi or wheezes  CV: IRIR  ABDOMEN: soft, nontender, no hepatosplenomegaly, no masses and bowel sounds normal  MS: no gross musculoskeletal defects noted, no edema  MS: bilateral trace LOWER EXTREMETIES edema  SKIN: no suspicious lesions or rashes  NEURO: Normal strength and tone, mentation intact and speech normal  PSYCH: mentation appears normal, affect normal/bright    ASSESSMENT / PLAN:   ASSESSMENT / PLAN:  (I73.9) PVD (peripheral vascular disease) (H)  (primary encounter diagnosis)  Comment: stable  Plan: exercise    (E78.5) Hyperlipidemia LDL goal <130  Comment: ok in past  Plan: atorvastatin (LIPITOR) 40 MG tablet        Per  cardiology    (I10) Hypertension goal BP (blood pressure) < 150/90  Comment: stable  Plan: Renal panel, furosemide (LASIX) 40 MG tablet,         losartan (COZAAR) 50 MG tablet        Extra 1/2 lasix prn. Recheck in 6 months  Continue monitor    (E03.8) Other specified hypothyroidism  Plan: TSH with free T4 reflex        Await labs    (R60.9) Peripheral edema  Comment: intermittent  Plan: TSH with free T4 reflex        Continue teds/exerciise. Extra prn lasix 1/2 pill. To ER if rapid weight gain/ shortness of breath     (I48.91) Atrial fibrillation, unspecified type (H)  Comment: stable  Plan: per cardiology. Continue eliquis.    (Z00.00) Encounter for annual wellness exam in Medicare patient  Comment: generally doing ok and good support system with son/daughter. Mild Memory issues but not driving.  Plan: vitamind. Exercise. .callle       Patient has been advised of split billing requirements and indicates understanding: Yes    COUNSELING:  Reviewed preventive health counseling, as reflected in patient instructions       Regular exercise       Healthy diet/nutrition       Vision screening       Hearing screening       Dental care       Bladder control       Fall risk prevention       Osteoporosis prevention/bone health    Estimated body mass index is 24.22 kg/m  as calculated from the following:    Height as of 5/31/22: 5' (1.524 m).    Weight as of this encounter: 124 lb (56.2 kg).        She reports that she has never smoked. She has never used smokeless tobacco.      Appropriate preventive services were discussed with this patient, including applicable screening as appropriate for cardiovascular disease, diabetes, osteopenia/osteoporosis, and glaucoma.  As appropriate for age/gender, discussed screening for colorectal cancer, prostate cancer, breast cancer, and cervical cancer. Checklist reviewing preventive services available has been given to the patient.    Reviewed patients plan of care and provided an AVS.  The Intermediate Care Plan ( asthma action plan, low back pain action plan, and migraine action plan) for Halle meets the Care Plan requirement. This Care Plan has been established and reviewed with the Patient and daughter.    Counseling Resources:  ATP IV Guidelines  Pooled Cohorts Equation Calculator  Breast Cancer Risk Calculator  Breast Cancer: Medication to Reduce Risk  FRAX Risk Assessment  ICSI Preventive Guidelines  Dietary Guidelines for Americans, 2010  [employer]'s MyPlate  ASA Prophylaxis  Lung CA Screening    Santino Gordon MD  [facility]    Identified Health Risks:

## 2022-08-19 ENCOUNTER — OFFICE VISIT (OUTPATIENT)
Dept: FAMILY MEDICINE | Facility: CLINIC | Age: 87
End: 2022-08-19
Payer: COMMERCIAL

## 2022-08-19 VITALS
SYSTOLIC BLOOD PRESSURE: 138 MMHG | HEART RATE: 62 BPM | WEIGHT: 124 LBS | TEMPERATURE: 97.9 F | DIASTOLIC BLOOD PRESSURE: 81 MMHG | OXYGEN SATURATION: 97 % | BODY MASS INDEX: 24.22 KG/M2

## 2022-08-19 DIAGNOSIS — I50.32 CHRONIC DIASTOLIC CONGESTIVE HEART FAILURE (H): ICD-10-CM

## 2022-08-19 DIAGNOSIS — Z00.00 ENCOUNTER FOR ANNUAL WELLNESS EXAM IN MEDICARE PATIENT: ICD-10-CM

## 2022-08-19 DIAGNOSIS — I48.91 ATRIAL FIBRILLATION, UNSPECIFIED TYPE (H): ICD-10-CM

## 2022-08-19 DIAGNOSIS — I46.9 CARDIAC ARREST, CAUSE UNSPECIFIED (H): ICD-10-CM

## 2022-08-19 DIAGNOSIS — I73.9 PVD (PERIPHERAL VASCULAR DISEASE) (H): Primary | ICD-10-CM

## 2022-08-19 DIAGNOSIS — E78.5 HYPERLIPIDEMIA LDL GOAL <130: ICD-10-CM

## 2022-08-19 DIAGNOSIS — E03.8 OTHER SPECIFIED HYPOTHYROIDISM: ICD-10-CM

## 2022-08-19 DIAGNOSIS — I10 HYPERTENSION GOAL BP (BLOOD PRESSURE) < 150/90: ICD-10-CM

## 2022-08-19 DIAGNOSIS — C43.9 MELANOMA OF SKIN (H): ICD-10-CM

## 2022-08-19 DIAGNOSIS — R60.0 PERIPHERAL EDEMA: ICD-10-CM

## 2022-08-19 LAB
ALBUMIN SERPL-MCNC: 3.8 G/DL (ref 3.4–5)
ANION GAP SERPL CALCULATED.3IONS-SCNC: 3 MMOL/L (ref 3–14)
BUN SERPL-MCNC: 18 MG/DL (ref 7–30)
CALCIUM SERPL-MCNC: 9.2 MG/DL (ref 8.5–10.1)
CHLORIDE BLD-SCNC: 105 MMOL/L (ref 94–109)
CO2 SERPL-SCNC: 32 MMOL/L (ref 20–32)
CREAT SERPL-MCNC: 0.64 MG/DL (ref 0.52–1.04)
GFR SERPL CREATININE-BSD FRML MDRD: 80 ML/MIN/1.73M2
GLUCOSE BLD-MCNC: 103 MG/DL (ref 70–99)
PHOSPHATE SERPL-MCNC: 3.7 MG/DL (ref 2.5–4.5)
POTASSIUM BLD-SCNC: 3.9 MMOL/L (ref 3.4–5.3)
SODIUM SERPL-SCNC: 140 MMOL/L (ref 133–144)
TSH SERPL DL<=0.005 MIU/L-ACNC: 2.83 MU/L (ref 0.4–4)

## 2022-08-19 PROCEDURE — 36415 COLL VENOUS BLD VENIPUNCTURE: CPT | Performed by: FAMILY MEDICINE

## 2022-08-19 PROCEDURE — 84443 ASSAY THYROID STIM HORMONE: CPT | Performed by: FAMILY MEDICINE

## 2022-08-19 PROCEDURE — G0438 PPPS, INITIAL VISIT: HCPCS | Performed by: FAMILY MEDICINE

## 2022-08-19 PROCEDURE — 99214 OFFICE O/P EST MOD 30 MIN: CPT | Mod: 25 | Performed by: FAMILY MEDICINE

## 2022-08-19 PROCEDURE — 80069 RENAL FUNCTION PANEL: CPT | Performed by: FAMILY MEDICINE

## 2022-08-19 RX ORDER — FUROSEMIDE 40 MG
40 TABLET ORAL EVERY MORNING
Qty: 135 TABLET | Refills: 1 | Status: SHIPPED | OUTPATIENT
Start: 2022-08-19 | End: 2023-04-04

## 2022-08-19 RX ORDER — PSYLLIUM SEED (WITH DEXTROSE)
2 POWDER (GRAM) ORAL
COMMUNITY
Start: 2022-07-13

## 2022-08-19 RX ORDER — LOSARTAN POTASSIUM 50 MG/1
TABLET ORAL
Qty: 90 TABLET | Refills: 1 | Status: SHIPPED | OUTPATIENT
Start: 2022-08-19 | End: 2023-03-08

## 2022-08-19 RX ORDER — LACTOBACILLUS RHAMNOSUS GG 10B CELL
CAPSULE ORAL
COMMUNITY
Start: 2022-07-13

## 2022-08-19 RX ORDER — ATORVASTATIN CALCIUM 40 MG/1
TABLET, FILM COATED ORAL
Qty: 90 TABLET | Refills: 1 | Status: SHIPPED | OUTPATIENT
Start: 2022-08-19 | End: 2023-03-08

## 2022-08-19 RX ORDER — NETARSUDIL AND LATANOPROST OPHTHALMIC SOLUTION, 0.02%/0.005% .2; .05 MG/ML; MG/ML
SOLUTION/ DROPS OPHTHALMIC; TOPICAL
COMMUNITY
Start: 2022-08-01

## 2022-08-19 ASSESSMENT — ACTIVITIES OF DAILY LIVING (ADL)
CURRENT_FUNCTION: TRANSPORTATION REQUIRES ASSISTANCE
CURRENT_FUNCTION: SHOPPING REQUIRES ASSISTANCE
CURRENT_FUNCTION: TELEPHONE REQUIRES ASSISTANCE

## 2022-08-19 ASSESSMENT — ENCOUNTER SYMPTOMS
JOINT SWELLING: 0
SORE THROAT: 0
HEMATOCHEZIA: 0
SHORTNESS OF BREATH: 1
FREQUENCY: 0
DIARRHEA: 0
PARESTHESIAS: 0
PALPITATIONS: 0
DIZZINESS: 0
COUGH: 0
ABDOMINAL PAIN: 0
CONSTIPATION: 0
BREAST MASS: 0
CHILLS: 0
HEMATURIA: 0
FEVER: 0
MYALGIAS: 0
ARTHRALGIAS: 1
HEARTBURN: 1
WEAKNESS: 0
NERVOUS/ANXIOUS: 0
HEADACHES: 0
NAUSEA: 0
EYE PAIN: 0
DYSURIA: 0

## 2022-08-19 ASSESSMENT — PAIN SCALES - GENERAL: PAINLEVEL: NO PAIN (0)

## 2022-08-30 ENCOUNTER — OFFICE VISIT (OUTPATIENT)
Dept: OTHER | Facility: CLINIC | Age: 87
End: 2022-08-30
Attending: INTERNAL MEDICINE
Payer: COMMERCIAL

## 2022-08-30 VITALS
WEIGHT: 125.2 LBS | DIASTOLIC BLOOD PRESSURE: 69 MMHG | BODY MASS INDEX: 24.58 KG/M2 | HEART RATE: 57 BPM | OXYGEN SATURATION: 98 % | SYSTOLIC BLOOD PRESSURE: 166 MMHG | HEIGHT: 60 IN

## 2022-08-30 DIAGNOSIS — I83.892 VARICOSE VEINS OF LEFT LOWER EXTREMITY WITH OTHER COMPLICATIONS: Primary | ICD-10-CM

## 2022-08-30 PROCEDURE — 99214 OFFICE O/P EST MOD 30 MIN: CPT | Performed by: INTERNAL MEDICINE

## 2022-08-30 PROCEDURE — G0463 HOSPITAL OUTPT CLINIC VISIT: HCPCS

## 2022-08-30 NOTE — PROGRESS NOTES
Patient is here to discuss follow up.    BP (!) 166/69 (BP Location: Left arm, Patient Position: Chair, Cuff Size: Child)   Pulse 57   Ht 5' (1.524 m)   Wt 125 lb 3.2 oz (56.8 kg)   LMP  (LMP Unknown)   SpO2 98%   BMI 24.45 kg/m      Questions patient would like addressed today are: N/A.    Refills are needed: No    Has homecare services and agency name:  Kimmy Cody

## 2022-10-10 ENCOUNTER — HEALTH MAINTENANCE LETTER (OUTPATIENT)
Age: 87
End: 2022-10-10

## 2022-10-20 ENCOUNTER — TELEPHONE (OUTPATIENT)
Dept: FAMILY MEDICINE | Facility: CLINIC | Age: 87
End: 2022-10-20

## 2022-10-20 NOTE — TELEPHONE ENCOUNTER
Daughter calld with pt on the line.   Pt's 2 big toes are sore. State that she has had this soreness/pain for years and it gets bad when she trims her toe nails.     Daughter is wondering if they need to see a specialist.   Advised on a visit to follow up with the provider and to determine if a referral is needed.      Assisted in scheduling soonest visit with PCP.      Rosa Isela Singh RN  [facility]

## 2022-10-31 NOTE — PROGRESS NOTES
ASSESSMENT / PLAN:  (M79.064,  M79.67) Pain in toes of both feet  (primary encounter diagnosis)  Comment: mild ingrown toenails  Plan: Orthopedic  Referral, lidocaine         (XYLOCAINE) 5 % external ointment        Grow out toenail and more square off. Reveiwed risks and side effects of medication  Expected course and warning signs reviewed. Follow-up podiatry if not improving. Consider antibiotic if worse. Call/email with questions/concerns         Vasu Neri is a 89 year old presenting for the following health issues:  Foot pain.  Follow-up htn, hypothyroid, PVD,peripheral edema.a.fib and high cholesterol. Seeing cardiology for a.fib and history angioplasty s/p MI.  bilateral big toe issues. Painful and years.   No swelling.   No chief complaint on file.      History of Present Illness       Reason for visit:  Foot pain  Symptom onset:  More than a month  Symptoms include:  Foot pain  Symptom intensity:  Severe  Symptom progression:  Staying the same  Had these symptoms before:  Yes  Has tried/received treatment for these symptoms:  No    She eats 2-3 servings of fruits and vegetables daily.She consumes 0 sweetened beverage(s) daily.She exercises with enough effort to increase her heart rate 9 or less minutes per day.  She exercises with enough effort to increase her heart rate 3 or less days per week.   She is taking medications regularly.         Review of Systems         Objective    LMP  (LMP Unknown)   /65   Pulse 68   Temp 98.1  F (36.7  C) (Oral)   Resp 18   Wt 56.5 kg (124 lb 9.6 oz)   LMP  (LMP Unknown)   SpO2 96%   BMI 24.33 kg/m     Physical Exam   GENERAL: healthy, alert and no distress  RESP: lungs clear to auscultation - no rales, rhonchi or wheezes  CV: regular rate and rhythm, normal S1 S2, no S3 or S4, no murmur, click or rub, no peripheral edema and peripheral pulses strong  MS: no gross musculoskeletal defects noted, no edema  SKIN: mild bilateral ingrown  toenails inner big toes. Some callous formation. No pus/dc or redness. Mild tenderness.   PSYCH: mentation appears normal, affect normal/bright

## 2022-11-03 ENCOUNTER — OFFICE VISIT (OUTPATIENT)
Dept: FAMILY MEDICINE | Facility: CLINIC | Age: 87
End: 2022-11-03
Payer: COMMERCIAL

## 2022-11-03 VITALS
BODY MASS INDEX: 24.33 KG/M2 | DIASTOLIC BLOOD PRESSURE: 65 MMHG | OXYGEN SATURATION: 96 % | WEIGHT: 124.6 LBS | RESPIRATION RATE: 18 BRPM | SYSTOLIC BLOOD PRESSURE: 135 MMHG | HEART RATE: 68 BPM | TEMPERATURE: 98.1 F

## 2022-11-03 DIAGNOSIS — M79.674 PAIN IN TOES OF BOTH FEET: Primary | ICD-10-CM

## 2022-11-03 DIAGNOSIS — I50.32 CHRONIC DIASTOLIC CONGESTIVE HEART FAILURE (H): ICD-10-CM

## 2022-11-03 DIAGNOSIS — C43.9 MELANOMA OF SKIN (H): ICD-10-CM

## 2022-11-03 DIAGNOSIS — M79.675 PAIN IN TOES OF BOTH FEET: Primary | ICD-10-CM

## 2022-11-03 PROBLEM — I46.9 CARDIAC ARREST, CAUSE UNSPECIFIED (H): Status: RESOLVED | Noted: 2022-08-19 | Resolved: 2022-11-03

## 2022-11-03 PROCEDURE — 90662 IIV NO PRSV INCREASED AG IM: CPT | Performed by: FAMILY MEDICINE

## 2022-11-03 PROCEDURE — 99213 OFFICE O/P EST LOW 20 MIN: CPT | Mod: 25 | Performed by: FAMILY MEDICINE

## 2022-11-03 PROCEDURE — G0008 ADMIN INFLUENZA VIRUS VAC: HCPCS | Performed by: FAMILY MEDICINE

## 2022-11-03 RX ORDER — LIDOCAINE 50 MG/G
OINTMENT TOPICAL 2 TIMES DAILY PRN
Qty: 30 G | Refills: 0 | Status: SHIPPED | OUTPATIENT
Start: 2022-11-03 | End: 2024-08-30

## 2022-11-03 ASSESSMENT — PAIN SCALES - GENERAL: PAINLEVEL: NO PAIN (0)

## 2022-11-29 ENCOUNTER — TELEPHONE (OUTPATIENT)
Dept: FAMILY MEDICINE | Facility: CLINIC | Age: 87
End: 2022-11-29

## 2022-11-29 DIAGNOSIS — G47.00 INSOMNIA, UNSPECIFIED TYPE: Primary | ICD-10-CM

## 2022-11-29 RX ORDER — ZOLPIDEM TARTRATE 5 MG/1
2.5 TABLET ORAL
Qty: 12 TABLET | Refills: 1 | Status: SHIPPED | OUTPATIENT
Start: 2022-11-29 | End: 2024-08-30

## 2022-11-29 NOTE — TELEPHONE ENCOUNTER
Called and spoke to patient and her daughter. Patient states she has had trouble sleeping for a while. Can you prescribe something, or, do you want an appointment set up to discuss this?Mere Rodrigez MA/RUSLAN

## 2022-11-29 NOTE — TELEPHONE ENCOUNTER
Reason for Call:  Other call back    Detailed comments: Patient wants call back to schedule or if meds can be prescribed over the phone    Phone Number Patient can be reached at: Cell number on file:    Telephone Information:   Mobile [phone]       Best Time: ANYTIME    Can we leave a detailed message on this number? YES    Call taken on 11/29/2022 at 10:53 AM by Arie Olea

## 2022-11-30 NOTE — TELEPHONE ENCOUNTER
We can try some low dosage ambien. This are really no super safe sleep aides in elderly but I've had good success with ambien at lower dosages. Melatonin/cbd oil are over the counter options. Video visit if not helpful or worse. Avoid with ALCOHOL. Santino Gordon MD

## 2022-11-30 NOTE — TELEPHONE ENCOUNTER
Spoke with daughter, informed of provider note as below  Did also advise daughter to have patient fill out a CTC when in clinic next    Stefania VELAZQUEZ, RN

## 2022-11-30 NOTE — TELEPHONE ENCOUNTER
Pt daughter notified of provider message as written.    Daughter asking what dose of Melatonin would you recommend.  Keysha YEN, RN

## 2022-12-19 ENCOUNTER — TELEPHONE (OUTPATIENT)
Dept: FAMILY MEDICINE | Facility: CLINIC | Age: 87
End: 2022-12-19

## 2022-12-19 NOTE — TELEPHONE ENCOUNTER
"Patient's daughter called to clinic regarding symptoms patient is experiencing. Daughter notes patient is 89 yrs old, just wants to make sure not missing anything or if need to be doing anything more about her symptoms.    Symptoms x 1 week now of runny and congested nose, mild dry cough, feeling very tired and more fatigued than usual, less energy, sleeping more than usual.    Denies fever, shortness of breath, wheezing, chest pain, severe weakness and lethargy, nausea, vomiting, diarrhea, loss of taste or smell, or muscle aches. No coughing fits. No known close exposure to COVID. Fully vaccinated + 2 booster vaccines. Not able to check O2 saturation.    Cough is \"once in a while\". Using cough drops to help with cough.   Most notable symptoms are the runny nose and fatigue.  Have NOT done a home COVID test.  Daughter noted she called to clinic about symptoms back on 12/16 and patient qualified to have a COVID test order placed. Daughter did not take patient to have this done as was told was going to have to schedule at a COVID hub site over the weekend and was too far away to bring patient to. Closest offered was in [place]. Daughter didn't want to drive patient that far and have sitting in car for an hour +.    Writer discussed daughter completing home test for COVID on patient today. Walked daughter through how to do this type of test. Encouraged to  a kit from pharmacy and test patient, to at least r/o this viral infection.  Daughter agreed with plan. Will go and get a home test kit today from pharmacy and complete on patient.    Writer also scheduled patient for virtual visit for tomorrow with Bar Hernandez PA-C with [facility] at 5:20 pm, per writer recommendation and daughter agreed with visit.  Discussed signs and symptoms that warranted sooner visit, including being seen in person. Discussed UC hours at [facility] this week. Call back and speak with nurse regarding any questions or " concerns. If symptoms worsening, possibly able to get patient in with PCP as well.    Encouraged good hydration with patient. Close monitoring of symptoms. IF COVID positive with home test, since symptoms ongoing x 1 week, patient would not qualify for any COVID treatment options, but can discuss care plan at home with nurses, if needed or can discuss more with provider at time of visit tomorrow, or sooner if worsening condition.    Daughter voiced good understanding and agreed with all plans.      Mirian Glass RN  [facility]

## 2022-12-20 ENCOUNTER — VIRTUAL VISIT (OUTPATIENT)
Dept: FAMILY MEDICINE | Facility: CLINIC | Age: 87
End: 2022-12-20
Payer: COMMERCIAL

## 2022-12-20 DIAGNOSIS — U07.1 INFECTION DUE TO 2019 NOVEL CORONAVIRUS: Primary | ICD-10-CM

## 2022-12-20 PROCEDURE — 99213 OFFICE O/P EST LOW 20 MIN: CPT | Mod: CS | Performed by: PHYSICIAN ASSISTANT

## 2022-12-20 NOTE — PROGRESS NOTES
"Halle is a 89 year old who is being evaluated via a billable video visit.      How would you like to obtain your AVS? Mail a copy  If the video visit is dropped, the invitation should be resent by: Text to cell phone: [phone]  Will anyone else be joining your video visit? Yes: daughter. How would they like to receive their invitation? Text to cell phone: [phone]     Assessment & Plan  appears well and clinically improving w/minimal symptoms currently , outside ambulatory tx window, UC/ED precautions discussed  Problem List Items Addressed This Visit    None  Visit Diagnoses     Infection due to 2019 novel coronavirus    -  Primary              20 minutes spent on the date of the encounter doing chart review, history and exam, documentation and further activities per the note     Return in about 1 week (around 12/27/2022), or if symptoms worsen or fail to improve, for PCP Follow-up.    JULIANA Kelley  [facility]    Vasu Neri is a 89 year old, presenting for the following health issues:  Covid 19 Treatment (Tested positive yesterday on two separate at home test)      HPI       COVID-19 Symptom Review  How many days ago did these symptoms start? Last Tuesday, about a week    Are any of the following symptoms significant for you?    New or worsening difficulty breathing? No    Worsening cough? Yes, it's a dry cough.  Mild, primarily in the morningws    Fever or chills? No    Headache: No    Sore throat: No    Chest pain: No    Diarrhea: No    Body aches? No    What treatments has patient tried? None because of heart medication. Just cough drops.   Does patient live in a nursing home, group home, or shelter? No  Does patient have a way to get food/medications during quarantined? Yes, I have a friend or family member who can help me.  Is vaccinated and boosted    \"today is a good day.\" she is feeling much better.       Review of Systems   RESP cough as above    "   Objective           Vitals:  No vitals were obtained today due to virtual visit.    Physical Exam   GENERAL: Healthy, alert and no distress  EYES: Eyes grossly normal to inspection.  No discharge or erythema, or obvious scleral/conjunctival abnormalities.  RESP: No audible wheeze, cough, or visible cyanosis.  No visible retractions or increased work of breathing.    SKIN: Visible skin clear. No significant rash, abnormal pigmentation or lesions.  NEURO: Cranial nerves grossly intact.  Mentation and speech appropriate for age.  PSYCH: Mentation appears normal, affect normal/bright, judgement and insight intact, normal speech and appearance well-groomed.      Video-Visit Details    Video Start Time: 4:59 PM    Type of service:  Video Visit    Video End Time:5:06 PM    Originating Location (pt. Location): Home      Distant Location (provider location):  On-site    Platform used for Video Visit: Leigh Ann

## 2023-01-19 DIAGNOSIS — I10 HYPERTENSION GOAL BP (BLOOD PRESSURE) < 150/90: ICD-10-CM

## 2023-01-19 DIAGNOSIS — I48.91 ATRIAL FIBRILLATION, UNSPECIFIED TYPE (H): ICD-10-CM

## 2023-01-20 RX ORDER — POTASSIUM CHLORIDE 750 MG/1
TABLET, EXTENDED RELEASE ORAL
Qty: 180 TABLET | Refills: 1 | Status: SHIPPED | OUTPATIENT
Start: 2023-01-20 | End: 2023-08-07

## 2023-01-20 RX ORDER — APIXABAN 2.5 MG/1
TABLET, FILM COATED ORAL
Qty: 180 TABLET | Refills: 3 | Status: SHIPPED | OUTPATIENT
Start: 2023-01-20 | End: 2024-08-30

## 2023-03-08 DIAGNOSIS — I10 HYPERTENSION GOAL BP (BLOOD PRESSURE) < 150/90: ICD-10-CM

## 2023-03-08 DIAGNOSIS — E78.5 HYPERLIPIDEMIA LDL GOAL <130: ICD-10-CM

## 2023-03-08 RX ORDER — LOSARTAN POTASSIUM 50 MG/1
TABLET ORAL
Qty: 90 TABLET | Refills: 0 | Status: SHIPPED | OUTPATIENT
Start: 2023-03-08 | End: 2023-06-19

## 2023-03-08 RX ORDER — ATORVASTATIN CALCIUM 40 MG/1
TABLET, FILM COATED ORAL
Qty: 90 TABLET | Refills: 1 | Status: SHIPPED | OUTPATIENT
Start: 2023-03-08 | End: 2023-08-29

## 2023-04-03 DIAGNOSIS — I10 HYPERTENSION GOAL BP (BLOOD PRESSURE) < 150/90: ICD-10-CM

## 2023-04-04 RX ORDER — FUROSEMIDE 40 MG
TABLET ORAL
Qty: 135 TABLET | Refills: 1 | Status: SHIPPED | OUTPATIENT
Start: 2023-04-04 | End: 2023-08-29

## 2023-05-01 DIAGNOSIS — K21.00 GASTROESOPHAGEAL REFLUX DISEASE WITH ESOPHAGITIS WITHOUT HEMORRHAGE: ICD-10-CM

## 2023-05-01 NOTE — TELEPHONE ENCOUNTER
Patient mother calling on patient behalf. No CTC. Requesting medication refill.    Med and pharmacy pended below.    Gilda Navarro RN

## 2023-06-16 DIAGNOSIS — I10 HYPERTENSION GOAL BP (BLOOD PRESSURE) < 150/90: ICD-10-CM

## 2023-06-19 RX ORDER — LOSARTAN POTASSIUM 50 MG/1
TABLET ORAL
Qty: 90 TABLET | Refills: 0 | Status: SHIPPED | OUTPATIENT
Start: 2023-06-19 | End: 2023-08-29

## 2023-07-20 ENCOUNTER — PATIENT OUTREACH (OUTPATIENT)
Dept: CARE COORDINATION | Facility: CLINIC | Age: 88
End: 2023-07-20
Payer: COMMERCIAL

## 2023-08-11 ENCOUNTER — OFFICE VISIT (OUTPATIENT)
Dept: PODIATRY | Facility: CLINIC | Age: 88
End: 2023-08-11
Attending: FAMILY MEDICINE
Payer: COMMERCIAL

## 2023-08-11 VITALS
DIASTOLIC BLOOD PRESSURE: 47 MMHG | HEIGHT: 60 IN | BODY MASS INDEX: 24.35 KG/M2 | HEART RATE: 86 BPM | WEIGHT: 124 LBS | SYSTOLIC BLOOD PRESSURE: 184 MMHG

## 2023-08-11 DIAGNOSIS — L84 CALLUS OF FOOT: Primary | ICD-10-CM

## 2023-08-11 DIAGNOSIS — M79.675 PAIN IN TOES OF BOTH FEET: ICD-10-CM

## 2023-08-11 DIAGNOSIS — I73.9 PVD (PERIPHERAL VASCULAR DISEASE) (H): ICD-10-CM

## 2023-08-11 DIAGNOSIS — M79.674 PAIN IN TOES OF BOTH FEET: ICD-10-CM

## 2023-08-11 PROCEDURE — 99203 OFFICE O/P NEW LOW 30 MIN: CPT | Performed by: PODIATRIST

## 2023-08-11 ASSESSMENT — PAIN SCALES - GENERAL: PAINLEVEL: MODERATE PAIN (4)

## 2023-08-11 NOTE — Clinical Note
8/11/2023         RE: Halle Saucedo  [id number] [address]        Dear Colleague,    Thank you for referring your patient, Halle Saucedo, to the [facility] [place]. Please see a copy of my visit note below.    PATIENT HISTORY:  Halle Saucedo is a 89 year old female who presents to clinic {FSOC CONSULT:309499} with a chief complaint of ***.  The patient is seen {sjaparent:960020}.  The patient relates the pain is primarily located around the ***.  {Precipitating Event:347793}  The patient relates that the symptoms have been going on for several {DAYS:531757}.  The patient has previously tried different shoes, *** with little relief.  The patient is {FSOCWORK:694534}.  Any previous notes and studies that pertain to the patient's condition were reviewed.    Pertinent medical, surgical and family history was reviewed in the [facility] chart.    Past Medical History:   Past Medical History:   Diagnosis Date    Glaucoma     left and right eyes    Other and unspecified hyperlipidemia     Unspecified essential hypertension        Medications:   Current Outpatient Medications:     amLODIPine (NORVASC) 5 MG tablet, TAKE 1 TABLET BY MOUTH DAILY FOR BLOOD PRESSURE, Disp: 90 tablet, Rfl: 1    aspirin 81 MG tablet, Take 81 mg by mouth daily, Disp: , Rfl:     atorvastatin (LIPITOR) 40 MG tablet, TAKE 1 TABLET(40 MG) BY MOUTH DAILY FOR CHOLESTEROL, Disp: 90 tablet, Rfl: 1    ELIQUIS ANTICOAGULANT 2.5 MG tablet, TAKE 1 TABLET(2.5 MG) BY MOUTH TWICE DAILY, Disp: 180 tablet, Rfl: 3    furosemide (LASIX) 40 MG tablet, TAKE 1 TABLET BY MOUTH EVERY MORNING MAY TAKE EXTRA 1/2 PILL AT NOON FOR LEG SWELLING OR BLOOD PRESSURE>150/100, Disp: 135 tablet, Rfl: 1    Lactobacillus-Inulin ([facility] DIGESTIVE [facility]) CAPS, , Disp: , Rfl:     lidocaine (XYLOCAINE) 5 % external ointment, Apply topically 2 times daily as needed for moderate pain, Disp: 30 g, Rfl: 0    losartan (COZAAR) 50 MG tablet, TAKE 1  TABLET BY MOUTH EVERY DAY FOR BLOOD PRESSURE, Disp: 90 tablet, Rfl: 0    metoprolol (TOPROL-XL) 25 MG 24 hr tablet, Take 25 mg by mouth daily, Disp: , Rfl:     Multiple Vitamins-Minerals (MULTIVITAMIN ADULTS 50+) TABS, Take 1 tablet by mouth daily, Disp: , Rfl:     nitroGLYcerin (NITROSTAT) 0.4 MG sublingual tablet, For chest pain place 1 tablet under the tongue every 5 minutes for 3 doses. If symptoms persist 5 minutes after 1st dose call 911., Disp: , Rfl:     omeprazole (PRILOSEC) 20 MG DR capsule, TAKE 1 CAPSULE(20 MG) BY MOUTH DAILY FOR 10 DAYS THEN AS NEEDED FOR HEARTBURN/ UPSET STOMACH, Disp: 90 capsule, Rfl: 1    polyvinyl alcohol-povidone PF (REFRESH) 1.4-0.6 % ophthalmic solution, Apply 1 drop to eye, Disp: , Rfl:     potassium chloride ER (K-TAB/KLOR-CON) 10 MEQ CR tablet, TAKE 1 TABLET(10 MEQ) BY MOUTH TWICE DAILY, Disp: 180 tablet, Rfl: 0    psyllium (METAMUCIL) WAFR, Take 2 Wafers by mouth, Disp: , Rfl:     ROCKLATAN 0.02-0.005 % SOLN, INSTILL 1 DROP IN BOTH EYES EVERY EVENING, Disp: , Rfl:     VITAMIN D, CHOLECALCIFEROL, PO, Take 2 tablets by mouth daily, Disp: , Rfl:     vitamin E 400 UNIT capsule, Take 800 Units by mouth daily, Disp: , Rfl:     zolpidem (AMBIEN) 5 MG tablet, Take 0.5 tablets (2.5 mg) by mouth nightly as needed for sleep (max x4/week), Disp: 12 tablet, Rfl: 1     Allergies:    Allergies   Allergen Reactions    Alphagan [Brimonidine Tartrate]     Azopt [Brinzolamide]     Beta Adrenergic Blockers     Bisphosphonates     Estrogens     Gemfibrozil     Trusopt [Dorzolamide] Itching     rash    Xalatan [Latanoprost]     Zetia [Ezetimibe]     Zocor [Statins]        Vitals: LMP  (LMP Unknown)   BMI= There is no height or weight on file to calculate BMI.    LOWER EXTREMITY PHYSICAL EXAM    Dermatologic: Skin is intact to {RIGHT /LEFT:260020} lower extremity without significant lesions, rash or abrasion.        Vascular: DP & PT pulses are intact & regular on the {RIGHT /LEFT:485594}.   CFT  and skin temperature is normal to the {RIGHT /LEFT:923121} lower extremity.     Neurologic: Lower extremity sensation is intact to light touch.  No evidence of weakness in the {RIGHT /LEFT:942762} lower extremity.        Musculoskeletal: Patient is ambulatory without assistive device or brace.  No gross ankle deformity noted.  No foot or ankle joint effusion is noted.  Noted ***    Diagnostics:  Radiographs included three views of the {RIGHT LEFT BOTH NO:976653} foot demonstrating *** no cortical erosions or periosteal elevation.  All joint margins appear stable.  There is no apparent fracture or tumor formation noted.  There is no evidence of foreign body.  The images were independently reviewed by myself along with the patient explaining the findings.      ASSESSMENT / PLAN:   No diagnosis found.    I have explained to Halle about the conditions.  We discussed the underlying contributing factors to the condition as well as both conservative and surgical treatment options along with expected length of recovery.  At this time, ***    Halle verbalized agreement with and understanding of the rational for the diagnosis and treatment plan.  All questions were answered to best of my ability and the patient's satisfaction. The patient was advised to contact the clinic with any questions that may arise after the clinic visit.      Disclaimer: This note consists of symbols derived from keyboarding, dictation and/or voice recognition software. As a result, there may be errors in the script that have gone undetected. Please consider this when interpreting information found in this chart.       SANGITA Maki D.P.M., F.A.C.F.A.S.        Again, thank you for allowing me to participate in the care of your patient.        Sincerely,        Destin Maki DPM

## 2023-08-11 NOTE — PATIENT INSTRUCTIONS
Calluses of the Foot    I.  Calluses on the toes   A.  Tips of the toes    1.  Due to pressure on the tips of the toes when wearing shoes, sandals or barefoot.    2.  Related to hammertoe deformity of the toes.    3.  Treated with wearing soft cushioned toe caps or shoe liners to better offload the pressure to the tips of the toes    4.  Correcting the deformity of the toe is another option if cushions do not help   B.  Top of sides of the knuckles of the toes    1.  Due to pressure from adjacent toes or shoes on the knuckles of the toes.    2.  Can be related to hammertoe deformities    3.  Treated with wearing roomy shoes with soft top-cover material in order not to rub on the skin    4.  Silicone toe caps can also be used to protect rubbing from the knuckles of the adjacent toes.    5.  Correcting the deformity of the toe is another option if offloading measures do not work.  II. Calluses on the bottom of the foot   A.   Pressure points    1.  Caused by direct pressure overlying prominent bones such as metatarsal head on the balls of the foot.    2.  Can be related to either hammertoe deformities or fat pad atrophy    3.  Can be treated with additional cushion utilizing silicone shoe liners to better offload the pressure and supplement for fat pad atrophy.    4.  Surgical correction of hammertoe deformities is another option if offloading cushion treatment does not work.   B.   Shear forces    1.  Caused by sliding forces along the skin on the bottom of the forefoot including the great toe.    2.  This is not due to a rotational force as the foot pushes off against the ground.    3.  Treated with wearing a silicone shoe liner that can move with the skin reducing the amount of shear force causing the callus formation.   C.   IPK lesions or porokeratosis    1.  These are small hard callus lesions that are related to plugged sweat ducts.    2.  These ducts travel through the epidermis and dermis residing in the  subcutaneous tissue    3.  When the ducts get clogged, they can harden up resulting in callused skin around them.      4.  Treatment includes sharp excavation of the hyperkeratotic callus tissue core as far as can be tolerated, preferably without bleeding.    5.   Other treatment options   A.  Application of salicylic acid to soften the hyperkeratotic skin   B.  Cantharone which causes a blister in attempt to pull off the hyperkeratotic skin lesion.    Helpful products:    Darco Peg-assist Insole System: [employer]    Soles  Silicone Shoe Inserts    Amazon: https://www.[employer]uesCom/Soles-Silicone-Orthopedic-Comfortable-Hypoallergenic    Dr. Coyle's Gels Toe Caps      Sold at [employer] in [facility] and [place]    Amazon: https://www.[employer]/Macarena-All-Mini-Size/

## 2023-08-11 NOTE — PROGRESS NOTES
PATIENT HISTORY:  Halle Saucedo is a 89 year old female who presents to clinic in consultation at the request of  Santino Gordon M.D. with a chief complaint of painful toes and callus.    The patient relates the pain is primarily located around the lesser toes on both feet.  Reports insidious onset without acute precipitating event.  The patient relates that the symptoms have been going on for several month(s).  The patient has previously tried different shoes with little relief.  The patient is retired.  Any previous notes and studies that pertain to the patient's condition were reviewed.    Pertinent medical, surgical and family history was reviewed in the [facility] chart.    Past Medical History:   Past Medical History:   Diagnosis Date    Glaucoma     left and right eyes    Other and unspecified hyperlipidemia     PVD (peripheral vascular disease) (H) 06/27/2011    Unspecified essential hypertension        Medications:   Current Outpatient Medications:     amLODIPine (NORVASC) 5 MG tablet, Take 1 tablet (5 mg) by mouth daily for blood pressure, Disp: 90 tablet, Rfl: 3    aspirin 81 MG tablet, Take 81 mg by mouth daily, Disp: , Rfl:     atorvastatin (LIPITOR) 40 MG tablet, TAKE 1 TABLET(40 MG) BY MOUTH DAILY FOR CHOLESTEROL, Disp: 90 tablet, Rfl: 3    ELIQUIS ANTICOAGULANT 2.5 MG tablet, TAKE 1 TABLET(2.5 MG) BY MOUTH TWICE DAILY, Disp: 180 tablet, Rfl: 3    furosemide (LASIX) 40 MG tablet, TAKE 1 TABLET BY MOUTH EVERY MORNING MAY TAKE EXTRA 1/2 PILL AT NOON FOR LEG SWELLING OR BLOOD PRESSURE>150/100, Disp: 135 tablet, Rfl: 1    Lactobacillus-Inulin ([facility] DIGESTIVE [facility]) CAPS, , Disp: , Rfl:     lidocaine (XYLOCAINE) 5 % external ointment, Apply topically 2 times daily as needed for moderate pain, Disp: 30 g, Rfl: 0    losartan (COZAAR) 50 MG tablet, Take 1 tablet (50 mg) by mouth daily for blood pressure, Disp: 90 tablet, Rfl: 3    metoprolol (TOPROL-XL) 25 MG 24 hr tablet, Take 25 mg by mouth daily, Disp: ,  Rfl:     Multiple Vitamins-Minerals (MULTIVITAMIN ADULTS 50+) TABS, Take 1 tablet by mouth daily, Disp: , Rfl:     nitroGLYcerin (NITROSTAT) 0.4 MG sublingual tablet, For chest pain place 1 tablet under the tongue every 5 minutes for 3 doses. If symptoms persist 5 minutes after 1st dose call 911., Disp: , Rfl:     omeprazole (PRILOSEC) 20 MG DR capsule, TAKE 1 CAPSULE(20 MG) BY MOUTH DAILY FOR 10 DAYS THEN AS NEEDED FOR HEARTBURN/ UPSET STOMACH, Disp: 90 capsule, Rfl: 1    polyvinyl alcohol-povidone PF (REFRESH) 1.4-0.6 % ophthalmic solution, Apply 1 drop to eye, Disp: , Rfl:     potassium chloride ER (K-TAB/KLOR-CON) 10 MEQ CR tablet, TAKE 1 TABLET(10 MEQ) BY MOUTH TWICE DAILY, Disp: 180 tablet, Rfl: 0    psyllium (METAMUCIL) WAFR, Take 2 Wafers by mouth, Disp: , Rfl:     ROCKLATAN 0.02-0.005 % SOLN, INSTILL 1 DROP IN BOTH EYES EVERY EVENING, Disp: , Rfl:     VITAMIN D, CHOLECALCIFEROL, PO, Take 2 tablets by mouth daily, Disp: , Rfl:     vitamin E 400 UNIT capsule, Take 800 Units by mouth daily (Patient not taking: Reported on 8/29/2023), Disp: , Rfl:     zolpidem (AMBIEN) 5 MG tablet, Take 0.5 tablets (2.5 mg) by mouth nightly as needed for sleep (max x4/week) (Patient not taking: Reported on 8/29/2023), Disp: 12 tablet, Rfl: 1     Allergies:    Allergies   Allergen Reactions    Alphagan [Brimonidine Tartrate]     Azopt [Brinzolamide]     Beta Adrenergic Blockers     Bisphosphonates     Estrogens     Gemfibrozil     Trusopt [Dorzolamide] Itching     rash    Xalatan [Latanoprost]     Zetia [Ezetimibe]     Zocor [Statins]        Vitals: BP (!) 184/47   Pulse 86   Ht 1.524 m (5')   Wt 56.2 kg (124 lb)   LMP  (LMP Unknown)   BMI 24.22 kg/m    BMI= Body mass index is 24.22 kg/m .    LOWER EXTREMITY PHYSICAL EXAM    Dermatologic: Skin is intact to right and left lower extremity without significant lesions, rash or abrasion.   Noted callus on the plantar aspect of the foot.  No surrounding erythema edema or  subdermal hemorrhage noted.     Vascular: DP & PT pulses are diminished on the right and left.   CFT and skin temperature is normal to the right and left lower extremity.     Neurologic: Lower extremity sensation is intact to light touch.  No evidence of weakness in the right and left lower extremity.        Musculoskeletal: Patient is ambulatory without assistive device or brace.  No gross ankle deformity noted.  No foot or ankle joint effusion is noted.              ASSESSMENT / PLAN:     ICD-10-CM    1. Callus of foot  L84       2. Pain in toes of both feet  M79.674 Orthopedic  Referral    M79.675       3. PVD (peripheral vascular disease) (H)  I73.9           I have explained to Halle about the conditions.  We discussed the underlying contributing factors to the condition as well as both conservative and surgical treatment options along with expected length of recovery.  At this time, the patient was instructed on wearing cushioned silicone liners to help offload the pressure causing the callus formation.  The patient may return to the office if any problems persist.    Halle verbalized agreement with and understanding of the rational for the diagnosis and treatment plan.  All questions were answered to best of my ability and the patient's satisfaction. The patient was advised to contact the clinic with any questions that may arise after the clinic visit.      Disclaimer: This note consists of symbols derived from keyboarding, dictation and/or voice recognition software. As a result, there may be errors in the script that have gone undetected. Please consider this when interpreting information found in this chart.       SANGITA Maki D.P.M., FELLEN.F.A.S.

## 2023-08-29 ENCOUNTER — OFFICE VISIT (OUTPATIENT)
Dept: FAMILY MEDICINE | Facility: CLINIC | Age: 88
End: 2023-08-29
Payer: COMMERCIAL

## 2023-08-29 VITALS
RESPIRATION RATE: 20 BRPM | HEIGHT: 60 IN | DIASTOLIC BLOOD PRESSURE: 71 MMHG | WEIGHT: 124.3 LBS | BODY MASS INDEX: 24.4 KG/M2 | OXYGEN SATURATION: 96 % | TEMPERATURE: 97.7 F | HEART RATE: 57 BPM | SYSTOLIC BLOOD PRESSURE: 125 MMHG

## 2023-08-29 DIAGNOSIS — I50.32 CHRONIC DIASTOLIC CONGESTIVE HEART FAILURE (H): ICD-10-CM

## 2023-08-29 DIAGNOSIS — I73.9 PVD (PERIPHERAL VASCULAR DISEASE) (H): ICD-10-CM

## 2023-08-29 DIAGNOSIS — I10 HYPERTENSION GOAL BP (BLOOD PRESSURE) < 150/90: ICD-10-CM

## 2023-08-29 DIAGNOSIS — I25.110 CORONARY ARTERY DISEASE INVOLVING NATIVE CORONARY ARTERY OF NATIVE HEART WITH UNSTABLE ANGINA PECTORIS (H): ICD-10-CM

## 2023-08-29 DIAGNOSIS — R41.3 MEMORY DIFFICULTIES: ICD-10-CM

## 2023-08-29 DIAGNOSIS — E03.8 OTHER SPECIFIED HYPOTHYROIDISM: ICD-10-CM

## 2023-08-29 DIAGNOSIS — I48.91 ATRIAL FIBRILLATION, UNSPECIFIED TYPE (H): ICD-10-CM

## 2023-08-29 DIAGNOSIS — E78.5 HYPERLIPIDEMIA LDL GOAL <130: ICD-10-CM

## 2023-08-29 DIAGNOSIS — Z00.00 ENCOUNTER FOR MEDICARE ANNUAL WELLNESS EXAM: ICD-10-CM

## 2023-08-29 DIAGNOSIS — Z00.00 ENCOUNTER FOR SUBSEQUENT ANNUAL WELLNESS VISIT IN MEDICARE PATIENT: Primary | ICD-10-CM

## 2023-08-29 LAB
ANION GAP SERPL CALCULATED.3IONS-SCNC: 10 MMOL/L (ref 7–15)
BUN SERPL-MCNC: 10.1 MG/DL (ref 8–23)
CALCIUM SERPL-MCNC: 9.3 MG/DL (ref 8.2–9.6)
CHLORIDE SERPL-SCNC: 103 MMOL/L (ref 98–107)
CHOLEST SERPL-MCNC: 142 MG/DL
CREAT SERPL-MCNC: 0.63 MG/DL (ref 0.51–0.95)
DEPRECATED HCO3 PLAS-SCNC: 27 MMOL/L (ref 22–29)
GFR SERPL CREATININE-BSD FRML MDRD: 80 ML/MIN/1.73M2
GLUCOSE SERPL-MCNC: 95 MG/DL (ref 70–99)
HDLC SERPL-MCNC: 69 MG/DL
LDLC SERPL CALC-MCNC: 59 MG/DL
NONHDLC SERPL-MCNC: 73 MG/DL
POTASSIUM SERPL-SCNC: 4.2 MMOL/L (ref 3.4–5.3)
SODIUM SERPL-SCNC: 140 MMOL/L (ref 136–145)
TRIGL SERPL-MCNC: 68 MG/DL
TSH SERPL DL<=0.005 MIU/L-ACNC: 3.07 UIU/ML (ref 0.3–4.2)

## 2023-08-29 PROCEDURE — 99214 OFFICE O/P EST MOD 30 MIN: CPT | Mod: 25 | Performed by: FAMILY MEDICINE

## 2023-08-29 PROCEDURE — 80048 BASIC METABOLIC PNL TOTAL CA: CPT | Performed by: FAMILY MEDICINE

## 2023-08-29 PROCEDURE — 80061 LIPID PANEL: CPT | Performed by: FAMILY MEDICINE

## 2023-08-29 PROCEDURE — 84443 ASSAY THYROID STIM HORMONE: CPT | Performed by: FAMILY MEDICINE

## 2023-08-29 PROCEDURE — G0439 PPPS, SUBSEQ VISIT: HCPCS | Performed by: FAMILY MEDICINE

## 2023-08-29 PROCEDURE — 36415 COLL VENOUS BLD VENIPUNCTURE: CPT | Performed by: FAMILY MEDICINE

## 2023-08-29 RX ORDER — AMLODIPINE BESYLATE 5 MG/1
5 TABLET ORAL DAILY
Qty: 90 TABLET | Refills: 3 | Status: SHIPPED | OUTPATIENT
Start: 2023-08-29 | End: 2024-08-30

## 2023-08-29 RX ORDER — LOSARTAN POTASSIUM 50 MG/1
50 TABLET ORAL DAILY
Qty: 90 TABLET | Refills: 3 | Status: SHIPPED | OUTPATIENT
Start: 2023-08-29 | End: 2024-08-30

## 2023-08-29 RX ORDER — ATORVASTATIN CALCIUM 40 MG/1
TABLET, FILM COATED ORAL
Qty: 90 TABLET | Refills: 3 | Status: SHIPPED | OUTPATIENT
Start: 2023-08-29 | End: 2024-08-30

## 2023-08-29 RX ORDER — FUROSEMIDE 40 MG
TABLET ORAL
Qty: 135 TABLET | Refills: 1 | Status: SHIPPED | OUTPATIENT
Start: 2023-08-29 | End: 2024-07-29

## 2023-08-29 ASSESSMENT — ENCOUNTER SYMPTOMS
HEARTBURN: 0
DYSURIA: 0
NAUSEA: 0
SORE THROAT: 0
NERVOUS/ANXIOUS: 0
DIARRHEA: 0
HEMATURIA: 0
HEADACHES: 0
SHORTNESS OF BREATH: 0
CONSTIPATION: 0
ARTHRALGIAS: 1
ABDOMINAL PAIN: 0
FREQUENCY: 0
COUGH: 0
WEAKNESS: 0
JOINT SWELLING: 0
PALPITATIONS: 0
PARESTHESIAS: 0
HEMATOCHEZIA: 0
BREAST MASS: 0
CHILLS: 0
DIZZINESS: 0
FEVER: 0
MYALGIAS: 0
EYE PAIN: 0

## 2023-08-29 ASSESSMENT — ACTIVITIES OF DAILY LIVING (ADL)
CURRENT_FUNCTION: HOUSEWORK REQUIRES ASSISTANCE
CURRENT_FUNCTION: TELEPHONE REQUIRES ASSISTANCE
CURRENT_FUNCTION: SHOPPING REQUIRES ASSISTANCE
CURRENT_FUNCTION: TRANSPORTATION REQUIRES ASSISTANCE
CURRENT_FUNCTION: MONEY MANAGEMENT REQUIRES ASSISTANCE

## 2023-08-29 ASSESSMENT — PAIN SCALES - GENERAL: PAINLEVEL: NO PAIN (0)

## 2023-08-29 NOTE — PROGRESS NOTES
"Answers submitted by the patient for this visit:  Annual Preventive Visit (Submitted on 8/29/2023)  Chief Complaint: Annual Exam:  In general, how would you rate your overall physical health?: good  Frequency of exercise:: None  Do you usually eat at least 4 servings of fruit and vegetables a day, include whole grains & fiber, and avoid regularly eating high fat or \"junk\" foods? : No  Taking medications regularly:: Yes  Medication side effects:: None  Activities of Daily Living: telephone requires assistance, transportation requires assistance, shopping requires assistance, housework requires assistance, money management requires assistance  Home safety: lack of grab bars in the bathroom  Hearing Impairment:: difficulty following a conversation in a noisy restaurant or crowded room, feel that people are mumbling or not speaking clearly, difficulty following dialogue in the theater, difficult to understand a speaker at a public meeting or Confucianist service, need to ask people to speak up or repeat themselves, find that men's voices are easier to understand than woman's, difficulty understanding soft or whispered speech, difficulty understanding speech on the telephone  In the past 6 months, have you been bothered by leaking of urine?: No  abdominal pain: No  Blood in stool: No  Blood in urine: No  chest pain: No  chills: No  congestion: No  constipation: No  cough: No  diarrhea: No  dizziness: No  ear pain: No  eye pain: No  nervous/anxious: No  fever: No  frequency: No  genital sores: No  headaches: No  hearing loss: Yes  heartburn: No  arthralgias: Yes  joint swelling: No  peripheral edema: No  mood changes: No  myalgias: No  nausea: No  dysuria: No  palpitations: No  Skin sensation changes: No  sore throat: No  urgency: No  rash: No  shortness of breath: No  visual disturbance: Yes  weakness: No  pelvic pain: No  vaginal bleeding: No  vaginal discharge: No  tenderness: No  breast mass: No  breast discharge: No  In " general, how would you rate your overall mental or emotional health?: good  Additional concerns today:: Yes    She is at risk for lack of exercise and has been provided with information to increase physical activity for the benefit of her well-being.  The patient was counseled and encouraged to consider modifying their diet and eating habits. She was provided with information on recommended healthy diet options.  The patient reports that she has difficulty with activities of daily living. I have asked that the patient make a follow up appointment in  weeks where this issue will be further evaluated and addressed.  The patient was provided with written information regarding signs of hearing loss.

## 2023-08-29 NOTE — PROGRESS NOTES
"SUBJECTIVE:   Halle is a 89 year old who presents for Preventive Visit.    Seen cardiology last month. Heart ok.  Not going to [place]. Taking VitaminD.  No falls. Memory down - per patient and daughter.  Sleep overall ok. Emotionally doing ok.   Lives with daughter. Had medicare - home safety eval.   Making food and doing laundry. Doing ok with medication.  Day to day basics ok. No driving. No current sleep aides and doing ok.  No nausea, vomiting or diarrhea or black/bloody stool. No urine changes or hematuria.   Limited meat.         8/29/2023    12:21 PM   Additional Questions   Roomed by GOGO ochoa CMA   Accompanied by Daughter-Vidal       Are you in the first 12 months of your Medicare coverage?  No    Healthy Habits:     In general, how would you rate your overall health?  Good    Frequency of exercise:  None    Do you usually eat at least 4 servings of fruit and vegetables a day, include whole grains    & fiber and avoid regularly eating high fat or \"junk\" foods?  No    Taking medications regularly:  Yes    Medication side effects:  None    Ability to successfully perform activities of daily living:  Telephone requires assistance, transportation requires assistance, shopping requires assistance, housework requires assistance and money management requires assistance    Home Safety:  Lack of grab bars in the bathroom    Hearing Impairment:  Difficulty following a conversation in a noisy restaurant or crowded room, feel that people are mumbling or not speaking clearly, difficulty following dialogue in the theater, difficult to understand a speaker at a public meeting or [employer] service, need to ask people to speak up or repeat themselves, find that men's voices are easier to understand than woman's, difficulty understanding soft or whispered speech and difficulty understanding speech on the telephone    In the past 6 months, have you been bothered by leaking of urine?  No    In general, how would you rate your " overall mental or emotional health?  Good    Additional concerns today:  Yes        Have you ever done Advance Care Planning? (For example, a Health Directive, POLST, or a discussion with a medical provider or your loved ones about your wishes): No, advance care planning information given to patient to review.  Patient plans to discuss their wishes with loved ones or provider.    Hard of hearing.     Fall risk  Fallen 2 or more times in the past year?: No  Any fall with injury in the past year?: No    Known memory issues.   Overall answers most questions appropriately.     Do you have sleep apnea, excessive snoring or daytime drowsiness? : sleepy during day    Reviewed and updated as needed this visit by clinical staff   Tobacco  Allergies  Meds              Reviewed and updated as needed this visit by Provider                 Social History     Tobacco Use     Smoking status: Never     Smokeless tobacco: Never   Substance Use Topics     Alcohol use: Not Currently     Comment: rare             8/29/2023    12:12 PM   Alcohol Use   Prescreen: >3 drinks/day or >7 drinks/week? No     Do you have a current opioid prescription? No  Do you use any other controlled substances or medications that are not prescribed by a provider? None      Patient Care Team:  Santino Gordon MD as PCP - General  Santino Gordon MD as Assigned PCP  Bar Montaño MD as Assigned Heart and Vascular Provider    The following health maintenance items are reviewed in Epic and correct as of today:  Health Maintenance   Topic Date Due     HF ACTION PLAN  Never done     ZOSTER IMMUNIZATION (2 of 3) 09/08/2010     EYE EXAM  01/01/2020     LIPID  08/20/2021     ALT  08/26/2022     CBC  08/26/2022     DTAP/TDAP/TD IMMUNIZATION (2 - Td or Tdap) 12/13/2022     COVID-19 Vaccine (6 - Pfizer series) 02/11/2023     BMP  02/19/2023     MEDICARE ANNUAL WELLNESS VISIT  08/19/2023     INFLUENZA VACCINE (1) 09/01/2023     ANNUAL REVIEW OF   ORDERS  12/20/2023     FALL RISK ASSESSMENT  08/29/2024     ADVANCE CARE PLANNING  08/19/2027     TSH W/FREE T4 REFLEX  Completed     PHQ-2 (once per calendar year)  Completed     Pneumococcal Vaccine: 65+ Years  Completed     IPV IMMUNIZATION  Aged Out     MENINGITIS IMMUNIZATION  Aged Out     Review of Systems   Constitutional:  Negative for chills and fever.   HENT:  Positive for hearing loss. Negative for congestion, ear pain and sore throat.    Eyes:  Positive for visual disturbance. Negative for pain.   Respiratory:  Negative for cough and shortness of breath.    Cardiovascular:  Negative for chest pain, palpitations and peripheral edema.   Gastrointestinal:  Negative for abdominal pain, constipation, diarrhea, heartburn, hematochezia and nausea.   Breasts:  Negative for tenderness, breast mass and discharge.   Genitourinary:  Negative for dysuria, frequency, genital sores, hematuria, pelvic pain, urgency, vaginal bleeding and vaginal discharge.   Musculoskeletal:  Positive for arthralgias. Negative for joint swelling and myalgias.   Skin:  Negative for rash.   Neurological:  Negative for dizziness, weakness, headaches and paresthesias.   Psychiatric/Behavioral:  Negative for mood changes. The patient is not nervous/anxious.        OBJECTIVE:   /71   Pulse 57   Temp 97.7  F (36.5  C) (Oral)   Resp 20   Ht 1.524 m (5')   Wt 56.4 kg (124 lb 4.8 oz)   LMP  (LMP Unknown)   SpO2 96%   BMI 24.28 kg/m     Physical Exam  GENERAL: healthy, alert and no distress  EYES: Eyes grossly normal to inspection, PERRL and conjunctivae and sclerae normal  HENT: ear canals and TM's normal, nose and mouth without ulcers or lesions  NECK: no adenopathy, no asymmetry, masses, or scars and thyroid normal to palpation  RESP: lungs clear to auscultation - no rales, rhonchi or wheezes  CV: IRIR with 2/6 sys murmur  ABDOMEN: soft, nontender, no hepatosplenomegaly, no masses and bowel sounds normal  MS: no gross  musculoskeletal defects noted, no edema  SKIN: no suspicious lesions or rashes  NEURO: Normal strength and tone, mentation a little slow and speech normal  PSYCH: mentation appears normal, affect normal/bright        ASSESSMENT / PLAN:   ASSESSMENT / PLAN:  (Z00.00) Encounter for subsequent annual wellness visit in Medicare patient  (primary encounter diagnosis)  Comment: overall doing ok and good support  Plan: continue vitaminD. Balanced diet with more protein and some fat.     (E78.5) HYPERLIPIDEMIA LDL GOAL <130  Comment: stalbe in past  Plan: Lipid panel reflex to direct LDL Non-fasting,         atorvastatin (LIPITOR) 40 MG tablet        Reveiwed risks and side effects of medication      (I10) Hypertension goal BP (blood pressure) < 150/90  Comment: stable  Plan: BASIC METABOLIC PANEL, amLODIPine (NORVASC) 5         MG tablet, furosemide (LASIX) 40 MG tablet,         losartan (COZAAR) 50 MG tablet        Continue self-monitor    (I25.110) Coronary artery disease involving native coronary artery of native heart with unstable angina pectoris (H)  Comment: stalbe  Plan: per cardiology.chest pain or shortness of breath to ER    (E03.8) Other specified hypothyroidism    Plan: TSH with free T4 reflex        Await labs     (I48.91) Atrial fibrillation, unspecified type (H)  Comment: sable  Plan: per cardiology    (I50.32) Chronic diastolic congestive heart failure (H)  Comment: stabl  Plan: per cardiology    (I73.9) PVD (peripheral vascular disease) (H)    (R41.3) Memory difficulties  Comment: mild at this point  Plan: will get more home health/eval if worse or new concerns. Overall functioning ok at home.       Patient has been advised of split billing requirements and indicates understanding: Yes      COUNSELING:  Reviewed preventive health counseling, as reflected in patient instructions       Regular exercise       Healthy diet/nutrition       Vision screening       Hearing screening       Dental care        Bladder control       Fall risk prevention       Osteoporosis prevention/bone health        She reports that she has never smoked. She has never used smokeless tobacco.      Appropriate preventive services were discussed with this patient, including applicable screening as appropriate for cardiovascular disease, diabetes, osteopenia/osteoporosis, and glaucoma.  As appropriate for age/gender, discussed screening for colorectal cancer, prostate cancer, breast cancer, and cervical cancer. Checklist reviewing preventive services available has been given to the patient.    Reviewed patients plan of care and provided an AVS. The Intermediate Care Plan ( asthma action plan, low back pain action plan, and migraine action plan) for Halle meets the Care Plan requirement. This Care Plan has been established and reviewed with the Patient and daughter.          Santino Gordon MD  [facility]    Identified Health Risks:  I have reviewed Opioid Use Disorder and Substance Use Disorder risk factors and made any needed referrals.

## 2023-08-29 NOTE — LETTER
August 30, 2023      Halle Saucedo  [address] [address] [facility] [id number]        Dear ,    We are writing to inform you of your test results.    Your test results fall within the expected range(s) or remain unchanged from previous results.  Please continue with current treatment plan.    Resulted Orders   Lipid panel reflex to direct LDL Non-fasting   Result Value Ref Range    Cholesterol 142 <200 mg/dL    Triglycerides 68 <150 mg/dL    Direct Measure HDL 69 >=50 mg/dL    LDL Cholesterol Calculated 59 <=100 mg/dL    Non HDL Cholesterol 73 <130 mg/dL    Narrative    Cholesterol  Desirable:  <200 mg/dL    Triglycerides  Normal:  Less than 150 mg/dL  Borderline High:  150-199 mg/dL  High:  200-499 mg/dL  Very High:  Greater than or equal to 500 mg/dL    Direct Measure HDL  Female:  Greater than or equal to 50 mg/dL   Male:  Greater than or equal to 40 mg/dL    LDL Cholesterol  Desirable:  <100mg/dL  Above Desirable:  100-129 mg/dL   Borderline High:  130-159 mg/dL   High:  160-189 mg/dL   Very High:  >= 190 mg/dL    Non HDL Cholesterol  Desirable:  130 mg/dL  Above Desirable:  130-159 mg/dL  Borderline High:  160-189 mg/dL  High:  190-219 mg/dL  Very High:  Greater than or equal to 220 mg/dL   BASIC METABOLIC PANEL   Result Value Ref Range    Sodium 140 136 - 145 mmol/L    Potassium 4.2 3.4 - 5.3 mmol/L    Chloride 103 98 - 107 mmol/L    Carbon Dioxide (CO2) 27 22 - 29 mmol/L    Anion Gap 10 7 - 15 mmol/L    Urea Nitrogen 10.1 8.0 - 23.0 mg/dL    Creatinine 0.63 0.51 - 0.95 mg/dL    Calcium 9.3 8.2 - 9.6 mg/dL    Glucose 95 70 - 99 mg/dL    GFR Estimate 80 >60 mL/min/1.73m2   TSH with free T4 reflex   Result Value Ref Range    TSH 3.07 0.30 - 4.20 uIU/mL       If you have any questions or concerns, please call the clinic at the number listed above.       Sincerely,      Santino Gordon MD

## 2023-10-11 ENCOUNTER — PATIENT OUTREACH (OUTPATIENT)
Dept: CARE COORDINATION | Facility: CLINIC | Age: 88
End: 2023-10-11
Payer: COMMERCIAL

## 2023-10-11 NOTE — PROGRESS NOTES
Clinical Product Navigator reviewed chart; patient on payer product coverage.  Review results:   CPN Initial Information Gathering  Referral Source: Health Plan  Referrals Places: Care Coordination    Patient identified by health plan for care management support with high probability risk of admission due to chronic medical conditions and utilization. Per chart review, patient would benefit from care management support for chronic disease management. Patient lives with daughter.    *See problem list and office visit 8/29/23 for potential [employer] benefit.     Patient Active Problem List   Diagnosis    HYPERLIPIDEMIA LDL GOAL <130    Advanced directives, counseling/discussion    Glaucoma associated with anomalies of iris    PVD (peripheral vascular disease) (H24)    Hard of hearing    Gout    Headache    GERD (gastroesophageal reflux disease)    Hypertension goal BP (blood pressure) < 150/90    Melanoma of skin (H)    Hypothyroidism    Other specified hypothyroidism    Seasonal allergic rhinitis, unspecified allergic rhinitis trigger    ERRONEOUS ENCOUNTER--DISREGARD    Stenosis of carotid artery, unspecified laterality    Atrial fibrillation, unspecified type (H)    ST elevation myocardial infarction (STEMI), unspecified artery (H)    Coronary artery disease involving native coronary artery of native heart with unstable angina pectoris (H)    Chronic diastolic congestive heart failure (H)    Memory difficulties       Met referral criteria for Care Coordinator; referral to be sent.    JEN Dhaliwal  Social Work Care Coordinator   Clinical Product Navigation

## 2023-10-12 ENCOUNTER — PATIENT OUTREACH (OUTPATIENT)
Dept: CARE COORDINATION | Facility: CLINIC | Age: 88
End: 2023-10-12
Payer: COMMERCIAL

## 2023-10-12 NOTE — PROGRESS NOTES
Community Health Worker Initial Outreach     Patient is doing well. Daughter manages most of her care. They are looking for a site to get her flu and covid booster. They are going to try the pharmacy.        Patient accepts CC: No, declined. Patient will be sent Care Coordination introduction letter for future reference.     KHADAR Gilmore, Tessa Vincent, Conner Webb [place] and [facility]  [phone]

## 2023-10-29 DIAGNOSIS — I10 HYPERTENSION GOAL BP (BLOOD PRESSURE) < 150/90: ICD-10-CM

## 2023-10-30 RX ORDER — POTASSIUM CHLORIDE 750 MG/1
TABLET, EXTENDED RELEASE ORAL
Qty: 180 TABLET | Refills: 2 | Status: SHIPPED | OUTPATIENT
Start: 2023-10-30 | End: 2024-07-29

## 2024-01-04 ENCOUNTER — TELEPHONE (OUTPATIENT)
Dept: FAMILY MEDICINE | Facility: CLINIC | Age: 89
End: 2024-01-04
Payer: COMMERCIAL

## 2024-01-04 NOTE — TELEPHONE ENCOUNTER
Form placed in your basket to complete    Forms/Letter Request    Type of form/letter: Handicap Parking permit    Have you been seen for this request: Yes     When is form/letter needed by: ASAP    How would you like the form/letter returned:     Patient Notified form requests are processed in 3-5 business days:Yes    Could we [facility] this information to you [facility] or would you prefer to receive a phone call?:   Patient would prefer a phone call   Okay to leave a detailed message?: Yes at Other phone number:  [phone]-Vidal-daughter    Mere Rodrigez [facility]

## 2024-01-10 NOTE — TELEPHONE ENCOUNTER
Patients daughter is calling for an update on this again,  Please advise  Thank you,  Maru THOMAS    [phone]

## 2024-01-11 NOTE — TELEPHONE ENCOUNTER
Dr Gordon complete the form. Form brought to the  for . Called and left message on Dianne's voicemail that this was done. Placed a copy in the TC form folder and sent a copy to be scanned into the chart.Mere MERCER [facility]

## 2024-03-01 NOTE — PROGRESS NOTES
Halle Saucedo is a 89 year old female who is presenting at the current time to discuss her diagnosi(es) of      Varicose veins of left lower extremity with other complications  .        HPI: Halle Saucedo is a highly functional and cognitively alert 89 year old female w/o a VTE hx who additionally has CAD and is S/P three TERRENCE in past for the above. She has not had a CABG or other vein stripping. She is on Eliquis for AF. She has over the last six months developed bilateral LE edema Rt>> Lt with recent weeping of serous fluid from the right calf which has since subsided. She notes this is bothersome and had not improved with knee high compression hosiery of unknown compressive degree or brand of stocking. She sought improvement in swelling as her main goal. She denies pain in the legs. She denies tightness or tautness, and denies claudication or CLI sxs. When last seen, her exam was consistent with CEAP C3 varicosities.  No evidence of bilateral lower extremity deep vein thrombus on vein comp study. She did have right common femoral, femoral, and popliteal vein incompetence, as well as left common femoral, distal femoral, and popliteal vein incompetence. Superficially, she had right greater saphenous vein incompetence, right small saphenous vein incompetence and right varicose vein incompetence. On the left she had greater saphenous vein and varicose vein incompetence. We prescribed thigh high Sigvaris 20-30 mm Hg compression hosiery. She presented 8/6/21 for f/u noting things had markedly improved with compression hosiery use. Today however, over one year later, she notes things have worsened clinically such that she had a single episode two weeks ago of marked serous drainage. This ws a single episode. It did not persist. She has no ulcers or skin breakdown. She presents to ascertain if there is anything else which needs to be done to address this other than compression.        Review Of Systems  Skin: negative  Eyes:  negative  Ears/Nose/Throat: negative  Respiratory: No shortness of breath, dyspnea on exertion, cough, or hemoptysis  Cardiovascular: negative  Gastrointestinal: negative  Genitourinary: negative  Musculoskeletal: negative  Neurologic: negative  Psychiatric: negative  Hematologic/Lymphatic/Immunologic: negative  Endocrine: negative           PAST MEDICAL HISTORY:                  Past Medical History        Past Medical History:   Diagnosis Date     Glaucoma       left and right eyes     Other and unspecified hyperlipidemia       Unspecified essential hypertension              PAST SURGICAL HISTORY:                  Past Surgical History         Past Surgical History:   Procedure Laterality Date     CATARACT IOL, RT/LT         D & C         TONSILLECTOMY & ADENOIDECTOMY         childhood            CURRENT MEDICATIONS:                  Current Outpatient Prescriptions          Current Outpatient Medications   Medication Sig Dispense Refill     amLODIPine (NORVASC) 5 MG tablet Take 1 tablet (5 mg) by mouth daily for blood pressure Pharmacy ok to hold prescription until due 90 tablet 1     apixaban ANTICOAGULANT (ELIQUIS) 2.5 MG tablet Take by mouth 2 times daily         aspirin 81 MG tablet Take 81 mg by mouth daily         atorvastatin (LIPITOR) 40 MG tablet Take 1 tablet (40 mg) by mouth daily For cholesterol 90 tablet 3     furosemide (LASIX) 40 MG tablet TAKE 1 TABLET BY MOUTH EVERY MORNING 90 tablet 1     losartan (COZAAR) 50 MG tablet TAKE 1 TABLET BY MOUTH EVERY DAY FOR BLOOD PRESSURE 90 tablet 1     LUMIGAN 0.01 % SOLN Place 1 drop into both eyes At Bedtime          metoprolol (TOPROL-XL) 25 MG 24 hr tablet Take 25 mg by mouth daily         Multiple Vitamins-Minerals (MULTIVITAMIN ADULTS 50+) TABS Take 1 tablet by mouth daily         netarsudil dimesylate (RHOPRESSA) 0.02 % SOLN At Bedtime         nitroGLYcerin (NITROSTAT) 0.4 MG sublingual tablet For chest pain place 1 tablet under the tongue every 5 minutes  for 3 doses. If symptoms persist 5 minutes after 1st dose call 911.         omeprazole (PRILOSEC) 20 MG DR capsule TAKE 1 CAPSULE(20 MG) BY MOUTH DAILY FOR 10 DAYS THEN AS NEEDED FOR HEARTBURN/ UPSET STOMACH 90 capsule 0     potassium chloride ER (K-TAB/KLOR-CON) 10 MEQ CR tablet Take 1 tablet (10 mEq) by mouth 2 times daily 180 tablet 1     VITAMIN D, CHOLECALCIFEROL, PO Take 2 tablets by mouth daily         vitamin E 400 UNIT capsule Take 800 Units by mouth daily                ALLERGIES:                        Allergies   Allergen Reactions     Alphagan [Brimonidine Tartrate]       Azopt [Brinzolamide]       Beta Adrenergic Blockers       Bisphosphonates       Estrogens       Gemfibrozil       Trusopt [Dorzolamide] Itching       rash     Xalatan [Latanoprost]       Zetia [Ezetimibe]       Zocor [Hmg-Coa-R Inhibitors]           SOCIAL HISTORY:                  Social History   Social History            Socioeconomic History     Marital status:        Spouse name: Not on file     Number of children: Not on file     Years of education: Not on file     Highest education level: Not on file   Occupational History     Not on file   Tobacco Use     Smoking status: Never Smoker     Smokeless tobacco: Never Used   Substance and Sexual Activity     Alcohol use: Yes       Comment: rare     Drug use: No     Sexual activity: Not Currently       Partners: Male   Other Topics Concern     Parent/sibling w/ CABG, MI or angioplasty before 65F 55M? Not Asked   Social History Narrative     Not on file      Social Determinants of Health          Financial Resource Strain:      Difficulty of Paying Living Expenses:    Food Insecurity:      Worried About Running Out of Food in the Last Year:      Ran Out of Food in the Last Year:    Transportation Needs:      Lack of Transportation (Medical):      Lack of Transportation (Non-Medical):    Physical Activity:      Days of Exercise per Week:      Minutes of Exercise per Session:     Stress:      Feeling of Stress :    Social Connections:      Frequency of Communication with Friends and Family:      Frequency of Social Gatherings with Friends and Family:      Attends Congregation Services:      Active Member of Clubs or Organizations:      Attends Club or Organization Meetings:      Marital Status:    Intimate Partner Violence:      Fear of Current or Ex-Partner:      Emotionally Abused:      Physically Abused:      Sexually Abused:             FAMILY HISTORY:                   Family History         Family History   Problem Relation Age of Onset     Hypertension Brother       Glaucoma Brother       Hypertension Sister       Glaucoma Sister       Hypertension Sister                 Physical exam Reveals:     O/P: WNL  HEENT: WNL  NECK: No JVD, thyromegaly, or lymphadenopathy  HEART: RRR, no murmurs, gallops, or rubs  LUNGS: CTA bilaterally without rales, wheezes, or rhonchi  GI: NABS, nondistended, nontender, soft  EXT:without cyanosis, clubbing, or edema  NEURO: nonfocal  : no flank tenderness                Cinthya Ospina on 2021 12:39 PM      Name:  Halle Saucedo                                                   Patient ID: [id number]  Date: May 5, 2021                                                      : 3/8/1926  Sex: female                                                                 Examined by: MONY Ospina RVT  Age:  89 year old                                                         Reading MD: GRISELDA     INDICATION:  Varicose veins of left lower extremity with other complications.     EXAM TYPE  BILATERAL LOWER EXTREMITY VENOUS DUPLEX FOR VENOUS INSUFFICIENCY  TECHNICAL SUMMARY     A duplex ultrasound study using color flow was performed to evaluate the bilateral lower extremity veins for valvular incompetence with the patient in a steep reversed trendelenberg.      RIGHT:     The deep veins demonstrate phasic flow, compress, and respond to augmentations.  There is no evidnece  of DVT.  The common femoral, femoral, and popliteal veins are incompetent and free of thrombus.      The GSV demonstrates phasic flow, compresses, and responds to augmentations from the saphenofemoral junction to the ankle with no evidence of thrombus. The greater saphenous vein measures 5.3 mm at the saphenofemoral junction, 5.0 mm in the proximal thigh,a nd 3.9 mm at the knee. The GSV is incompetent from the SFJ to the proximal calf with the greatest reflux time of 4438 milliseconds. The GSV gives rise to multiple incompetent varicose veins, the largest measures 3.7 mm off the proximal martina that courses anteromedially and has a reflux time of 2507 milliseconds.     The AASV is competent ( 1.9 mm) draining into the saphenofemoral junction.      The Giacomini vein is competent ( 2.6 mm) communicating with the small saphenous vein at the knee level.      The SSV demonstrates phasic flow, compresses, and responds to augmentations from the popliteal space to the ankle.  No thrombus is seen. The saphenopopliteal junction is incompetent (4.3 mm). The SSV is incompetent from the the SPJ to the distal calf with a reflux time of 2310 milliseconds.  The SSV gives rise to multiple incompetent varicose veins, the largest measuring 3.3 mm off the mid calf that courses medially with a reflux time of 1699 milliseconds.     Perforators: There is no evidence of incompetent  veins at any level.      LEFT:     The deep veins demonstrate phasic flow, compress, and respond to augmentations.  There is no evidence of DVT.  The common femoral, distal femoral, and popliteal veins are incompetent and free of thrombus. The remaining deep vein segements are competent and free of thrombus.      The GSV demonstrates phasic flow, compresses, and responds to augmentations from the saphenofemoral junction to the ankle with no evidence of  thrombus. The greater saphenous vein measures 5.5 mm at the saphenofemoral junction, 4.4 mm in the  proximal thigh, and 3.4 mm at the knee. The GSV is incompetent from the SFJ to the proximal calf with the greatest reflux time of 3382 milliseconds. The GSV gives rise to multiple incompetent varicose veins, the largest measures 3.6 mm off the proximal calf that courses anteromedially with a reflux time of 1518 milliseconds.      The AASV is not visualized.     The Giacomini vein is competent ( .9 mm) communicating with the small saphenous vein at the knee level.     The SSV demonstrates phasic flow, compresses, and responds to augmentations from the popliteal space to the ankle.  No reflux or thrombus is seen. The saphenopopliteal junction is absent.      Perforators: There is no evidence of incompetent  veins at any level.      FINAL SUMMARY:  1.         No evidence of bilateral lower extremity deep vein thrombus.  2.         Right common femoral, femoral, and popliteal vein incompetence.  Left common femoral, distal femoral, and popliteal vein                      incompetence.  3.         Right greater saphenous vein incompetence.  4.         Right small saphenous vein incompetence.  5.         Right varicose vein incompetence.  6.         Left greater saphenous vein incompetence.  7.         Left varicose vein incompetence.  8.         The time of incompetence is greater than 500 milliseconds in length.            A/P:      5/5/21 venous comp study:    Rt CFV,FV,PV,GSV,SSV,VV incompetence.  Lt GSV, VV incompetence           (I83.892) Varicose veins of left lower extremity with other complications  Comment: CEAP C 3 varicosities noted. Pathophysiology of varicose veins described to patient and daughter. RLE is larger than LLE  Plan: Continue thigh high Sigvaris 20-30 mm Hg compression hosiery, will use a FlexiTouch pump if that fails. If that fails, would pursue venous closure procedures. RTC prn     35 minutes total medical care on today's date.     used

## 2024-03-02 NOTE — TELEPHONE ENCOUNTER
Losartan refill request  Last OV: 11/30/20 Dr. Santino Gordon  To MD to advise; failed RN refill protocol  BP Readings from Last 3 Encounters:   01/28/21 (!) 159/57   12/07/20 (!) 158/74   11/30/20 131/73       
Mailed letter.Mere Rodrigez MA/RUSLAN    
Wellness exam labs needed in 2 months please. Santino Gordon MD   
IV discontinued, cath removed intact

## 2024-03-08 NOTE — PATIENT INSTRUCTIONS
"Patient Education   Personalized Prevention Plan  You are due for the preventive services outlined below.  Your care team is available to assist you in scheduling these services.  If you have already completed any of these items, please share that information with your care team to update in your medical record.  Health Maintenance Due   Topic Date Due     Heart Failure Action Plan  Never done     Zoster (Shingles) Vaccine (2 of 3) 09/08/2010     Eye Exam  01/01/2020     Cholesterol Lab  08/20/2021     Liver Monitoring Lab  08/26/2022     Complete Blood Count  08/26/2022     Diptheria Tetanus Pertussis (DTAP/TDAP/TD) Vaccine (2 - Td or Tdap) 12/13/2022     COVID-19 Vaccine (6 - Pfizer series) 02/11/2023     Basic Metabolic Panel  02/19/2023     Learning About Being Physically Active  What is physical activity?     Being physically active means doing any kind of activity that gets your body moving.  The types of physical activity that can help you get fit and stay healthy include:  Aerobic or \"cardio\" activities. These make your heart beat faster and make you breathe harder, such as brisk walking, riding a bike, or running. They strengthen your heart and lungs and build up your endurance.  Strength training activities. These make your muscles work against, or \"resist,\" something. Examples include lifting weights or doing push-ups. These activities help tone and strengthen your muscles and bones.  Stretches. These let you move your joints and muscles through their full range of motion. Stretching helps you be more flexible.  Reaching a balance between these three types of physical activity is important because each one contributes to your overall fitness.  What are the benefits of being active?  Being active is one of the best things you can do for your health. It helps you to:  Feel stronger and have more energy to do all the things you like to do.  Focus better at school or work.  Feel, think, and sleep " Patient requests order for mammogram, also requests refill of Norco and Xanax. Requested medication pended for PCP approval.  OV is scheduled for April 12, 2024   "better.  Reach and stay at a healthy weight.  Lose fat and build lean muscle.  Lower your risk for serious health problems, including diabetes, heart attack, high blood pressure, and some cancers.  Keep your heart, lungs, bones, muscles, and joints strong and healthy.  How can you make being active part of your life?  Start slowly. Make it your long-term goal to get at least 30 minutes of exercise on most days of the week. Walking is a good choice. You also may want to do other activities, such as running, swimming, cycling, or playing tennis or team sports.  Pick activities that you like--ones that make your heart beat faster, your muscles stronger, and your muscles and joints more flexible. If you find more than one thing you like doing, do them all. You don't have to do the same thing every day.  Get your heart pumping every day. Any activity that makes your heart beat faster and keeps it at that rate for a while counts.  Here are some great ways to get your heart beating faster:  Go for a brisk walk, run, or bike ride.  Go for a hike or swim.  Go in-line skating.  Play a game of touch football, basketball, or soccer.  Ride a bike.  Play tennis or racquetball.  Climb stairs.  Even some household chores can be aerobic--just do them at a faster pace. Vacuuming, raking or mowing the lawn, sweeping the garage, and washing and waxing the car all can help get your heart rate up.  Strengthen your muscles during the week. You don't have to lift heavy weights or grow big, bulky muscles to get stronger. Doing a few simple activities that make your muscles work against, or \"resist,\" something can help you get stronger.  For example, you can:  Do push-ups or sit-ups, which use your own body weight as resistance.  Lift weights or dumbbells or use stretch bands at home or in a gym or community center.  Stretch your muscles often. Stretching will help you as you become more active. It can help you stay flexible, loosen tight " "muscles, and avoid injury. It can also help improve your balance and posture and can be a great way to relax.  Be sure to stretch the muscles you'll be using when you work out. It's best to warm your muscles slightly before you stretch them. Walk or do some other light aerobic activity for a few minutes, and then start stretching.  When you stretch your muscles:  Do it slowly. Stretching is not about going fast or making sudden movements.  Don't push or bounce during a stretch.  Hold each stretch for at least 15 to 30 seconds, if you can. You should feel a stretch in the muscle, but not pain.  Breathe out as you do the stretch. Then breathe in as you hold the stretch. Don't hold your breath.  If you're worried about how more activity might affect your health, have a checkup before you start. Follow any special advice your doctor gives you for getting a smart start.  Where can you learn more?  Go to https://www.[employer].[address]/patiented  Enter W332 in the search box to learn more about \"Learning About Being Physically Active.\"  Current as of: October 10, 2022               Content Version: 13.7    2875-7720 [employer].   Care instructions adapted under license by your healthcare professional. If you have questions about a medical condition or this instruction, always ask your healthcare professional. [employer] disclaims any warranty or liability for your use of this information.      Learning About Dietary Guidelines  What are the Dietary Guidelines for Americans?     Dietary Guidelines for Americans provide tips for eating well and staying healthy. This helps reduce the risk for long-term (chronic) diseases.  These guidelines recommend that you:  Eat and drink the right amount for you. The U.[place] government's food guide is called MyPlate. It can help you make your own well-balanced eating plan.  Try to balance your eating with your activity. This helps you stay at a healthy weight.  Drink " "alcohol in moderation, if at all.  Limit foods high in salt, saturated fat, trans fat, and added sugar.  These guidelines are from the U.S. Department of Agriculture and the U.S. Department of Health and Human Services. They are updated every 5 years.  What is MyPlate?  MyPlate is the U.[place] government's food guide. It can help you make your own well-balanced eating plan. A balanced eating plan means that you eat enough, but not too much, and that your food gives you the nutrients you need to stay healthy.  MyPlate focuses on eating plenty of whole grains, fruits, and vegetables, and on limiting fat and sugar. It is available online at www.ChooseMyPlate.gov.  How can you get started?  If you're trying to eat healthier, you can slowly change your eating habits over time. You don't have to make big changes all at once. Start by adding one or two healthy foods to your meals each day.  Grains  Choose whole-grain breads and cereals and whole-wheat pasta and whole-grain crackers.  Vegetables  Eat a variety of vegetables every day. They have lots of nutrients and are part of a heart-healthy diet.  Fruits  Eat a variety of fruits every day. Fruits contain lots of nutrients. Choose fresh fruit instead of fruit juice.  Protein foods  Choose fish and lean poultry more often. Eat red meat and fried meats less often. Dried beans, tofu, and nuts are also good sources of protein.  Dairy  Choose low-fat or fat-free products from this food group. If you have problems digesting milk, try eating cheese or yogurt instead.  Fats and oils  Limit fats and oils if you're trying to cut calories. Choose healthy fats when you cook. These include canola oil and olive oil.  Where can you learn more?  Go to https://www.healthRECCY.net/patiented  Enter D676 in the search box to learn more about \"Learning About Dietary Guidelines.\"  Current as of: March 1, 2023               Content Version: 13.7    6964-5223 [employer].   Care " instructions adapted under license by your healthcare professional. If you have questions about a medical condition or this instruction, always ask your healthcare professional. [employer] disclaims any warranty or liability for your use of this information.      Activities of Daily Living    Your Health Risk Assessment indicates you have difficulties with activities of daily living such as housework, bathing, preparing meals, taking medication, etc. Please make a follow up appointment for us to address this issue in more detail.  Hearing Loss: Care Instructions  Overview     Hearing loss is a sudden or slow decrease in how well you hear. It can range from slight to profound. Permanent hearing loss can occur with aging. It also can happen when you are exposed long-term to loud noise. Examples include listening to loud music, riding motorcycles, or being around other loud machines.  Hearing loss can affect your work and home life. It can make you feel lonely or depressed. You may feel that you have lost your independence. But hearing aids and other devices can help you hear better and feel connected to others.  Follow-up care is a key part of your treatment and safety. Be sure to make and go to all appointments, and call your doctor if you are having problems. It's also a good idea to know your test results and keep a list of the medicines you take.  How can you care for yourself at home?  Avoid loud noises whenever possible. This helps keep your hearing from getting worse.  Always wear hearing protection around loud noises.  Wear a hearing aid as directed.  A professional can help you pick a hearing aid that will work best for you.  You can also get hearing aids over the counter for mild to moderate hearing loss.  Have hearing tests as your doctor suggests. They can show whether your hearing has changed. Your hearing aid may need to be adjusted.  Use other devices as needed. These may  "include:  Telephone amplifiers and hearing aids that can connect to a television, stereo, radio, or microphone.  Devices that use lights or vibrations. These alert you to the doorbell, a ringing telephone, or a baby monitor.  Television closed-captioning. This shows the words at the bottom of the screen. Most new TVs can do this.  TTY (text telephone). This lets you type messages back and forth on the telephone instead of talking or listening. These devices are also called TDD. When messages are typed on the keyboard, they are sent over the phone line to a receiving TTY. The message is shown on a monitor.  Use text messaging, social media, and email if it is hard for you to communicate by telephone.  Try to learn a listening technique called speechreading. It is not lipreading. You pay attention to people's gestures, expressions, posture, and tone of voice. These clues can help you understand what a person is saying. Face the person you are talking to, and have them face you. Make sure the lighting is good. You need to see the other person's face clearly.  Think about counseling if you need help to adjust to your hearing loss.  When should you call for help?  Watch closely for changes in your health, and be sure to contact your doctor if:    You think your hearing is getting worse.     You have new symptoms, such as dizziness or nausea.   Where can you learn more?  Go to https://www.[employer].net/patiented  Enter R798 in the search box to learn more about \"Hearing Loss: Care Instructions.\"  Current as of: March 1, 2023               Content Version: 13.7    3848-6994 [employer].   Care instructions adapted under license by your healthcare professional. If you have questions about a medical condition or this instruction, always ask your healthcare professional. [employer] disclaims any warranty or liability for your use of this information.         "

## 2024-07-26 DIAGNOSIS — I10 HYPERTENSION GOAL BP (BLOOD PRESSURE) < 150/90: ICD-10-CM

## 2024-07-28 DIAGNOSIS — I10 HYPERTENSION GOAL BP (BLOOD PRESSURE) < 150/90: ICD-10-CM

## 2024-07-29 RX ORDER — FUROSEMIDE 40 MG
TABLET ORAL
Qty: 135 TABLET | Refills: 0 | Status: SHIPPED | OUTPATIENT
Start: 2024-07-29 | End: 2024-08-30

## 2024-07-29 RX ORDER — POTASSIUM CHLORIDE 750 MG/1
TABLET, EXTENDED RELEASE ORAL
Qty: 180 TABLET | Refills: 0 | Status: SHIPPED | OUTPATIENT
Start: 2024-07-29

## 2024-08-30 ENCOUNTER — OFFICE VISIT (OUTPATIENT)
Dept: FAMILY MEDICINE | Facility: CLINIC | Age: 89
End: 2024-08-30
Payer: COMMERCIAL

## 2024-08-30 VITALS
BODY MASS INDEX: 23.79 KG/M2 | TEMPERATURE: 97.6 F | HEIGHT: 59 IN | OXYGEN SATURATION: 97 % | WEIGHT: 118 LBS | SYSTOLIC BLOOD PRESSURE: 125 MMHG | RESPIRATION RATE: 20 BRPM | HEART RATE: 60 BPM | DIASTOLIC BLOOD PRESSURE: 64 MMHG

## 2024-08-30 DIAGNOSIS — R07.9 CHEST PAIN, UNSPECIFIED TYPE: ICD-10-CM

## 2024-08-30 DIAGNOSIS — M79.675 PAIN IN TOES OF BOTH FEET: ICD-10-CM

## 2024-08-30 DIAGNOSIS — I48.91 ATRIAL FIBRILLATION, UNSPECIFIED TYPE (H): ICD-10-CM

## 2024-08-30 DIAGNOSIS — I10 HYPERTENSION GOAL BP (BLOOD PRESSURE) < 150/90: ICD-10-CM

## 2024-08-30 DIAGNOSIS — M79.674 PAIN IN TOES OF BOTH FEET: ICD-10-CM

## 2024-08-30 DIAGNOSIS — K21.00 GASTROESOPHAGEAL REFLUX DISEASE WITH ESOPHAGITIS WITHOUT HEMORRHAGE: ICD-10-CM

## 2024-08-30 DIAGNOSIS — G47.00 INSOMNIA, UNSPECIFIED TYPE: ICD-10-CM

## 2024-08-30 DIAGNOSIS — E78.5 HYPERLIPIDEMIA LDL GOAL <130: ICD-10-CM

## 2024-08-30 DIAGNOSIS — Z00.00 ENCOUNTER FOR MEDICARE ANNUAL WELLNESS EXAM: Primary | ICD-10-CM

## 2024-08-30 PROCEDURE — 99214 OFFICE O/P EST MOD 30 MIN: CPT | Mod: 25 | Performed by: FAMILY MEDICINE

## 2024-08-30 PROCEDURE — G0439 PPPS, SUBSEQ VISIT: HCPCS | Performed by: FAMILY MEDICINE

## 2024-08-30 RX ORDER — FUROSEMIDE 40 MG
TABLET ORAL
Qty: 135 TABLET | Refills: 1 | Status: SHIPPED | OUTPATIENT
Start: 2024-08-30

## 2024-08-30 RX ORDER — AMLODIPINE BESYLATE 5 MG/1
5 TABLET ORAL DAILY
Qty: 90 TABLET | Refills: 3 | Status: SHIPPED | OUTPATIENT
Start: 2024-08-30

## 2024-08-30 RX ORDER — ATORVASTATIN CALCIUM 40 MG/1
TABLET, FILM COATED ORAL
Qty: 90 TABLET | Refills: 3 | Status: SHIPPED | OUTPATIENT
Start: 2024-08-30

## 2024-08-30 RX ORDER — LIDOCAINE 50 MG/G
OINTMENT TOPICAL 2 TIMES DAILY PRN
Qty: 30 G | Refills: 4 | Status: SHIPPED | OUTPATIENT
Start: 2024-08-30

## 2024-08-30 RX ORDER — METOPROLOL SUCCINATE 25 MG/1
25 TABLET, EXTENDED RELEASE ORAL DAILY
Qty: 100 TABLET | Refills: 3 | Status: SHIPPED | OUTPATIENT
Start: 2024-08-30

## 2024-08-30 RX ORDER — ISOSORBIDE MONONITRATE 30 MG/1
15 TABLET, EXTENDED RELEASE ORAL
COMMUNITY
Start: 2024-06-25

## 2024-08-30 RX ORDER — LOSARTAN POTASSIUM 50 MG/1
50 TABLET ORAL DAILY
Qty: 90 TABLET | Refills: 1 | Status: SHIPPED | OUTPATIENT
Start: 2024-08-30

## 2024-08-30 RX ORDER — NITROGLYCERIN 0.4 MG/1
TABLET SUBLINGUAL
Qty: 20 TABLET | Refills: 1 | Status: SHIPPED | OUTPATIENT
Start: 2024-08-30

## 2024-08-30 RX ORDER — ZOLPIDEM TARTRATE 5 MG/1
2.5 TABLET ORAL
Qty: 12 TABLET | Refills: 1 | Status: SHIPPED | OUTPATIENT
Start: 2024-08-30

## 2024-08-30 SDOH — HEALTH STABILITY: PHYSICAL HEALTH: ON AVERAGE, HOW MANY DAYS PER WEEK DO YOU ENGAGE IN MODERATE TO STRENUOUS EXERCISE (LIKE A BRISK WALK)?: 0 DAYS

## 2024-08-30 ASSESSMENT — PAIN SCALES - GENERAL: PAINLEVEL: NO PAIN (0)

## 2024-08-30 ASSESSMENT — SOCIAL DETERMINANTS OF HEALTH (SDOH): HOW OFTEN DO YOU GET TOGETHER WITH FRIENDS OR RELATIVES?: ONCE A WEEK

## 2024-08-30 NOTE — PATIENT INSTRUCTIONS
Patient Education   Preventive Care Advice   This is general advice given by our system to help you stay healthy. However, your care team may have specific advice just for you. Please talk to your care team about your preventive care needs.  Nutrition  Eat 5 or more servings of fruits and vegetables each day.  Try wheat bread, brown rice and whole grain pasta (instead of white bread, rice, and pasta).  Get enough calcium and vitamin D. Check the label on foods and aim for 100% of the RDA (recommended daily allowance).  Lifestyle  Exercise at least 150 minutes each week  (30 minutes a day, 5 days a week).  Do muscle strengthening activities 2 days a week. These help control your weight and prevent disease.  No smoking.  Wear sunscreen to prevent skin cancer.  Have a dental exam and cleaning every 6 months.  Yearly exams  See your health care team every year to talk about:  Any changes in your health.  Any medicines your care team has prescribed.  Preventive care, family planning, and ways to prevent chronic diseases.  Shots (vaccines)   HPV shots (up to age 26), if you've never had them before.  Hepatitis B shots (up to age 59), if you've never had them before.  COVID-19 shot: Get this shot when it's due.  Flu shot: Get a flu shot every year.  Tetanus shot: Get a tetanus shot every 10 years.  Pneumococcal, hepatitis A, and RSV shots: Ask your care team if you need these based on your risk.  Shingles shot (for age 50 and up)  General health tests  Diabetes screening:  Starting at age 35, Get screened for diabetes at least every 3 years.  If you are younger than age 35, ask your care team if you should be screened for diabetes.  Cholesterol test: At age 39, start having a cholesterol test every 5 years, or more often if advised.  Bone density scan (DEXA): At age 50, ask your care team if you should have this scan for osteoporosis (brittle bones).  Hepatitis C: Get tested at least once in your life.  STIs (sexually  transmitted infections)  Before age 24: Ask your care team if you should be screened for STIs.  After age 24: Get screened for STIs if you're at risk. You are at risk for STIs (including HIV) if:  You are sexually active with more than one person.  You don't use condoms every time.  You or a partner was diagnosed with a sexually transmitted infection.  If you are at risk for HIV, ask about PrEP medicine to prevent HIV.  Get tested for HIV at least once in your life, whether you are at risk for HIV or not.  Cancer screening tests  Cervical cancer screening: If you have a cervix, begin getting regular cervical cancer screening tests starting at age 21.  Breast cancer scan (mammogram): If you've ever had breasts, begin having regular mammograms starting at age 40. This is a scan to check for breast cancer.  Colon cancer screening: It is important to start screening for colon cancer at age 45.  Have a colonoscopy test every 10 years (or more often if you're at risk) Or, ask your provider about stool tests like a FIT test every year or Cologuard test every 3 years.  To learn more about your testing options, visit:   .  For help making a decision, visit:   https://bit.ly/vn92556.  Prostate cancer screening test: If you have a prostate, ask your care team if a prostate cancer screening test (PSA) at age 55 is right for you.  Lung cancer screening: If you are a current or former smoker ages 50 to 80, ask your care team if ongoing lung cancer screenings are right for you.  For informational purposes only. Not to replace the advice of your health care provider. Copyright   2023 [facility]. All rights reserved. Clinically reviewed by the [facility]. [place] [id number] - REV 01/24.  Preventing Falls: Care Instructions  Injuries and health problems such as trouble walking or poor eyesight can increase your risk of falling. So can some medicines. But there are things you can do to help  "prevent falls. You can exercise to get stronger. You can also arrange your home to make it safer.    Talk to your doctor about the medicines you take. Ask if any of them increase the risk of falls and whether they can be changed or stopped.   Try to exercise regularly. It can help improve your strength and balance. This can help lower your risk of falling.     Practice fall safety and prevention.    Wear low-heeled shoes that fit well and give your feet good support. Talk to your doctor if you have foot problems that make this hard.  Carry a cellphone or wear a medical alert device that you can use to call for help.  Use stepladders instead of chairs to reach high objects. Don't climb if you're at risk for falls. Ask for help, if needed.  Wear the correct eyeglasses, if you need them.    Make your home safer.    Remove rugs, cords, clutter, and furniture from walkways.  Keep your house well lit. Use night-lights in hallways and bathrooms.  Install and use sturdy handrails on stairways.  Wear nonskid footwear, even inside. Don't walk barefoot or in socks without shoes.    Be safe outside.    Use handrails, curb cuts, and ramps whenever possible.  Keep your hands free by using a shoulder bag or backpack.  Try to walk in well-lit areas. Watch out for uneven ground, changes in pavement, and debris.  Be careful in the winter. Walk on the grass or gravel when sidewalks are slippery. Use de-icer on steps and walkways. Add non-slip devices to shoes.    Put grab bars and nonskid mats in your shower or tub and near the toilet. Try to use a shower chair or bath bench when bathing.   Get into a tub or shower by putting in your weaker leg first. Get out with your strong side first. Have a phone or medical alert device in the bathroom with you.   Where can you learn more?  Go to https://www.[employer].net/patiented  Enter G117 in the search box to learn more about \"Preventing Falls: Care Instructions.\"  Current as of: July 17, " 2023               Content Version: 14.0    1525-9207 [employer].   Care instructions adapted under license by your healthcare professional. If you have questions about a medical condition or this instruction, always ask your healthcare professional. [employer] disclaims any warranty or liability for your use of this information.      Hearing Loss: Care Instructions  Overview     Hearing loss is a sudden or slow decrease in how well you hear. It can range from slight to profound. Permanent hearing loss can occur with aging. It also can happen when you are exposed long-term to loud noise. Examples include listening to loud music, riding motorcycles, or being around other loud machines.  Hearing loss can affect your work and home life. It can make you feel lonely or depressed. You may feel that you have lost your independence. But hearing aids and other devices can help you hear better and feel connected to others.  Follow-up care is a key part of your treatment and safety. Be sure to make and go to all appointments, and call your doctor if you are having problems. It's also a good idea to know your test results and keep a list of the medicines you take.  How can you care for yourself at home?  Avoid loud noises whenever possible. This helps keep your hearing from getting worse.  Always wear hearing protection around loud noises.  Wear a hearing aid as directed.  A professional can help you pick a hearing aid that will work best for you.  You can also get hearing aids over the counter for mild to moderate hearing loss.  Have hearing tests as your doctor suggests. They can show whether your hearing has changed. Your hearing aid may need to be adjusted.  Use other devices as needed. These may include:  Telephone amplifiers and hearing aids that can connect to a television, stereo, radio, or microphone.  Devices that use lights or vibrations. These alert you to the doorbell, a ringing  "telephone, or a baby monitor.  Television closed-captioning. This shows the words at the bottom of the screen. Most new TVs can do this.  TTY (text telephone). This lets you type messages back and forth on the telephone instead of talking or listening. These devices are also called TDD. When messages are typed on the keyboard, they are sent over the phone line to a receiving TTY. The message is shown on a monitor.  Use text messaging, social media, and email if it is hard for you to communicate by telephone.  Try to learn a listening technique called speechreading. It is not lipreading. You pay attention to people's gestures, expressions, posture, and tone of voice. These clues can help you understand what a person is saying. Face the person you are talking to, and have them face you. Make sure the lighting is good. You need to see the other person's face clearly.  Think about counseling if you need help to adjust to your hearing loss.  When should you call for help?  Watch closely for changes in your health, and be sure to contact your doctor if:    You think your hearing is getting worse.     You have new symptoms, such as dizziness or nausea.   Where can you learn more?  Go to https://www.[employer].net/patiented  Enter R798 in the search box to learn more about \"Hearing Loss: Care Instructions.\"  Current as of: September 27, 2023               Content Version: 14.0    9892-2411 [employer].   Care instructions adapted under license by your healthcare professional. If you have questions about a medical condition or this instruction, always ask your healthcare professional. [employer] disclaims any warranty or liability for your use of this information.      Bladder Training: Care Instructions  Your Care Instructions     Bladder training is used to treat urge incontinence and stress incontinence. Urge incontinence means that the need to urinate comes on so fast that you can't get to a " toilet in time. Stress incontinence means that you leak urine because of pressure on your bladder. For example, it may happen when you laugh, cough, or lift something heavy.  Bladder training can increase how long you can wait before you have to urinate. It can also help your bladder hold more urine. And it can give you better control over the urge to urinate.  It is important to remember that bladder training takes a few weeks to a few months to make a difference. You may not see results right away, but don't give up.  Follow-up care is a key part of your treatment and safety. Be sure to make and go to all appointments, and call your doctor if you are having problems. It's also a good idea to know your test results and keep a list of the medicines you take.  How can you care for yourself at home?  Work with your doctor to come up with a bladder training program that is right for you. You may use one or more of the following methods.  Delayed urination  In the beginning, try to keep from urinating for 5 minutes after you first feel the need to go.  While you wait, take deep, slow breaths to relax. Kegel exercises can also help you delay the need to go to the bathroom.  After some practice, when you can easily wait 5 minutes to urinate, try to wait 10 minutes before you urinate.  Slowly increase the waiting period until you are able to control when you have to urinate.  Scheduled urination  Empty your bladder when you first wake up in the morning.  Schedule times throughout the day when you will urinate.  Start by going to the bathroom every hour, even if you don't need to go.  Slowly increase the time between trips to the bathroom.  When you have found a schedule that works well for you, keep doing it.  If you wake up during the night and have to urinate, do it. Apply your schedule to waking hours only.  Kegel exercises  These tighten and strengthen pelvic muscles, which can help you control the flow of urine. (If  "doing these exercises causes pain, stop doing them and talk with your doctor.) To do Kegel exercises:  Squeeze your muscles as if you were trying not to pass gas. Or squeeze your muscles as if you were stopping the flow of urine. Your belly, legs, and buttocks shouldn't move.  Hold the squeeze for 3 seconds, then relax for 5 to 10 seconds.  Start with 3 seconds, then add 1 second each week until you are able to squeeze for 10 seconds.  Repeat the exercise 10 times a session. Do 3 to 8 sessions a day.  When should you call for help?  Watch closely for changes in your health, and be sure to contact your doctor if:    Your incontinence is getting worse.     You do not get better as expected.   Where can you learn more?  Go to https://www.[employer].net/patiented  Enter V684 in the search box to learn more about \"Bladder Training: Care Instructions.\"  Current as of: November 15, 2023               Content Version: 14.0    1480-4846 [employer].   Care instructions adapted under license by your healthcare professional. If you have questions about a medical condition or this instruction, always ask your healthcare professional. [employer] disclaims any warranty or liability for your use of this information.         "

## 2024-08-30 NOTE — PROGRESS NOTES
Preventive Care Visit  [facility]  Santino Gordon MD, Family Medicine  Aug 30, 2024      ASSESSMENT / PLAN:  (Z00.00) Encounter for Medicare annual wellness exam  (primary encounter diagnosis)  Comment: no falls and good support with daughter but memory slowly decreaseing  Plan: might need addition help at home and will place home healthy eval if needed. Call/email with questions/concerns  vitaminD.    (M01.674,  M79.670) Pain in toes of both feet  Comment: stable  Plan: lidocaine (XYLOCAINE) 5 % external ointment        Prn.     (R07.9) Chest pain, unspecified type  Comment: stable  Plan: nitroGLYcerin (NITROSTAT) 0.4 MG sublingual         tablet        To ER if prolonged. Seeing cardiology    (I10) Hypertension goal BP (blood pressure) < 150/90  Comment: stable  Plan: amLODIPine (NORVASC) 5 MG tablet, furosemide         (LASIX) 40 MG tablet, losartan (COZAAR) 50 MG         tablet, metoprolol succinate ER (TOPROL XL) 25         MG 24 hr tablet        Recheck in 6 months      (E78.5) HYPERLIPIDEMIA LDL GOAL <130  Comment: stable in past  Plan: atorvastatin (LIPITOR) 40 MG tablet        Not more lipids needed with age.     (I48.91) Atrial fibrillation, unspecified type (H)  Comment: stable  Plan: apixaban ANTICOAGULANT (ELIQUIS ANTICOAGULANT)         2.5 MG tablet            (K21.00) Gastroesophageal reflux disease with esophagitis without hemorrhage  Comment: stabl  Plan: omeprazole (PRILOSEC) 20 MG DR capsule        Prn.     (G47.00) Insomnia, unspecified type  Comment: rare ambien in past  Plan: zolpidem (AMBIEN) 5 MG tablet        Reveiwed risks and side effects of medication  Consider selective serotonin reuptake inhibitor if more anxiety issues. Recheck in 6 months       Vasu Neri is a 89 year old, presenting for the following:  Wellness Visit    Follow-up htn, hypothyroid and high cholesterol. Seeing cardiology for a.fib and history angioplasty s/p MI   Seen cardiology last  month. New medication and no more chest pain.  Seeing again next month.   Not going to [place]. Taking VitaminD.  No falls. Using cane. Memory down - per patient and daughter.  Sleep overall ok. Emotionally doing ok.   Lives with daughter. Had medicare - home safety eval.   Comfort keepers going with daughter going [place].  Making some food and doing laundry. Doing ok with medication.  Day to day basics ok. No driving. No current sleep aides and doing ok.  No nausea, vomiting or diarrhea or black/bloody stool. No urine changes or hematuria.   Limited meat.  No gerd symptoms       8/30/2024    12:13 PM   Additional Questions   Roomed by scooter   Accompanied by daughter-jaleesa         8/30/2024    12:13 PM   Patient Reported Additional Medications   Patient reports taking the following new medications see chart         Health Care Directive  Patient does not have a Health Care Directive or Living Will: Advance Directive received and scanned. Click on Code in the patient header to view.          8/30/2024   General Health   How would you rate your overall physical health? (!) FAIR   Feel stress (tense, anxious, or unable to sleep) To some extent      (!) STRESS CONCERN      8/30/2024   Nutrition   Diet: Regular (no restrictions)            8/30/2024   Exercise   Days per week of moderate/strenous exercise 0 days      (!) EXERCISE CONCERN      8/30/2024   Social Factors   Frequency of gathering with friends or relatives Once a week   Worry food won't last until get money to buy more No   Food not last or not have enough money for food? No   Do you have housing? (Housing is defined as stable permanent housing and does not include staying ouside in a car, in a tent, in an abandoned building, in an overnight shelter, or couch-surfing.) Yes   Are you worried about losing your housing? No   Lack of transportation? No   Unable to get utilities (heat,electricity)? Yes   Want help with housing or utility concern? No      (!)  FINANCIAL RESOURCE STRAIN CONCERN      8/30/2024   Fall Risk   Fallen 2 or more times in the past year? No    No   Trouble with walking or balance? Yes    Yes   Reason Gait Speed Test Not Completed Patient does not comprehend instructions       Multiple values from one day are sorted in reverse-chronological order          8/30/2024   Activities of Daily Living- Home Safety   Needs help with the following daily activites None of the above   Safety concerns in the home None of the above            8/30/2024   Dental   Dentist two times every year? Yes            8/30/2024   Hearing Screening   Hearing concerns? (!) I FEEL THAT PEOPLE ARE MUMBLING OR NOT SPEAKING CLEARLY.    (!) I NEED TO ASK PEOPLE TO SPEAK UP OR REPEAT THEMSELVES.    (!) IT'S HARDER TO UNDERSTAND WOMEN'S VOICES THAN MEN'S VOICES.    (!) IT'S HARD TO FOLLOW A CONVERSATION IN A NOISY RESTAURANT OR CROWDED ROOM.    (!) TROUBLE UNDESTANDING A SPEAKER IN A PUBLIC MEETING OR [employer] SERVICE.    (!) TROUBLE FOLLOWING DIALOGUE IN THE THEATHER.    (!) TROUBLE UNDERSTANDING SOFT OR WHISPERED SPEECH.    (!) TROUBLE UNDERSTANDING SPEECH ON THE TELEPHONE       Multiple values from one day are sorted in reverse-chronological order         8/30/2024   Driving Risk Screening   Patient/family members have concerns about driving (!) DECLINE            8/30/2024   General Alertness/Fatigue Screening   Have you been more tired than usual lately? No            8/30/2024   Urinary Incontinence Screening   Bothered by leaking urine in past 6 months Yes            8/30/2024   TB Screening   Were you born outside of the [place]? No            Today's PHQ-2 Score:       8/30/2024    12:08 PM   PHQ-2 ( 1999 Pfizer)   PHQ-2 Score Incomplete           8/30/2024   Substance Use   Alcohol more than 3/day or more than 7/wk No   Do you have a current opioid prescription? No   How severe/bad is pain from 1 to 10? 0/10 (No Pain)   Do you use any other substances recreationally? No     "    Social History     Tobacco Use    Smoking status: Never     Passive exposure: Never    Smokeless tobacco: Never   Vaping Use    Vaping status: Never Used   Substance Use Topics    Alcohol use: Not Currently     Comment: rare    Drug use: No                    Reviewed and updated as needed this visit by Provider                      Current providers sharing in care for this patient include:  Patient Care Team:  Santino Gordon MD as PCP - General  Santino Gordon MD as Assigned PCP  Destin Maki DPM as Assigned Surgical Provider    The following health maintenance items are reviewed in Epic and correct as of today:  Health Maintenance   Topic Date Due    HF ACTION PLAN  Never done    RSV VACCINE (1 - 1-dose 60+ series) Never done    ZOSTER IMMUNIZATION (2 of 3) 09/08/2010    ALT  08/26/2022    CBC  08/26/2022    DTAP/TDAP/TD IMMUNIZATION (2 - Td or Tdap) 12/13/2022    ANNUAL REVIEW OF HM ORDERS  12/20/2023    PHQ-2 (once per calendar year)  01/01/2024    BMP  02/29/2024    COVID-19 Vaccine (7 - 2023-24 season) 02/29/2024    LIPID  08/29/2024    INFLUENZA VACCINE (1) 09/01/2024    EYE EXAM  10/05/2024    MEDICARE ANNUAL WELLNESS VISIT  08/30/2025    FALL RISK ASSESSMENT  08/30/2025    ADVANCE CARE PLANNING  08/29/2028    TSH W/FREE T4 REFLEX  Completed    Pneumococcal Vaccine: 65+ Years  Completed    HPV IMMUNIZATION  Aged Out    MENINGITIS IMMUNIZATION  Aged Out    RSV MONOCLONAL ANTIBODY  Aged Out            Objective    Exam  /64   Pulse 60   Temp 97.6  F (36.4  C) (Tympanic)   Resp 20   Ht 1.499 m (4' 11\")   Wt 53.5 kg (118 lb)   LMP  (LMP Unknown)   SpO2 97%   BMI 23.83 kg/m     Estimated body mass index is 23.83 kg/m  as calculated from the following:    Height as of this encounter: 1.499 m (4' 11\").    Weight as of this encounter: 53.5 kg (118 lb).    Physical Exam  GENERAL: alert and no distress  EYES: Eyes grossly normal to inspection, PERRL and conjunctivae and sclerae " normal  HENT: ear canals and TM's normal, nose and mouth without ulcers or lesions  NECK: no adenopathy, no asymmetry, masses, or scars  RESP: lungs clear to auscultation - no rales, rhonchi or wheezes  CV: regular rate and rhythm, normal S1 S2, no S3 or S4, no murmur, click or rub, no peripheral edema   ABDOMEN: soft, nontender, no hepatosplenomegaly, no masses and bowel sounds normal  MS: no gross musculoskeletal defects noted, no edema  SKIN: no suspicious lesions or rashes  NEURO: Normal strength and tone, speech normal  NEURO: grossly non-focal. Poor historian    PSYCH: mildly anxious        8/30/2024   Mini Cog   Mini-Cog Not Completed (choose reason) Deaf/hard of hearing          Known cognitive issues       Signed Electronically by: Santino Gordon MD

## 2024-09-03 ENCOUNTER — TELEPHONE (OUTPATIENT)
Dept: FAMILY MEDICINE | Facility: CLINIC | Age: 89
End: 2024-09-03
Payer: COMMERCIAL

## 2024-09-03 DIAGNOSIS — H91.93 BILATERAL DEAFNESS: ICD-10-CM

## 2024-09-03 DIAGNOSIS — R41.3 MEMORY DIFFICULTIES: Primary | ICD-10-CM

## 2024-09-03 NOTE — TELEPHONE ENCOUNTER
General Call    Contacts       Contact Date/Time Type Contact Phone/Fax    09/03/2024 02:35 PM CDT Phone (Incoming) Dianne Saucedo (Emergency Contact) [phone] (M)          Reason for Call:  Patient's  daughter called. She said you had mentioned that you can give her information on where to start for  for care for her mom.      Could we send this information to you in [facility] or would you prefer to receive a phone call?:   Patient would prefer a phone call   Okay to leave a detailed message?: Yes at Cell number on file:    Telephone Information:   Mobile [phone]     Mere Rodrigez [facility]

## 2024-09-04 NOTE — TELEPHONE ENCOUNTER
[facility] called. They do not take her insurance. She will need to go to another agency, or would have to pay out of pocket.Mere MERCER [facility]

## 2024-09-04 NOTE — TELEPHONE ENCOUNTER
Called and spoke to patient's daughter. I asked her to call her mom's insurance to se what home care agency would be covered and to call us back. We can then fax the order to a different home care.Mere Rodrigez [facility]

## 2024-10-23 DIAGNOSIS — I10 HYPERTENSION GOAL BP (BLOOD PRESSURE) < 150/90: ICD-10-CM

## 2024-10-23 RX ORDER — POTASSIUM CHLORIDE 750 MG/1
TABLET, EXTENDED RELEASE ORAL
Qty: 180 TABLET | Refills: 1 | Status: SHIPPED | OUTPATIENT
Start: 2024-10-23

## 2024-11-22 NOTE — PROGRESS NOTES
Assessment & Plan     Periorbital contusion of right eye, initial encounter  discussed with patient her concerns   Exam showed bruise around the right eye likely secondary to anticoagulation  VISION   Wears glasses: worn for testing  Tool used: Partida   Right eye:        10/25 (20/50)  Left eye:          10/20 (20/40)  Both: 10/20(20/40)  Visual Acuity: REFER     Referral to optometry placed , urgent appointment was made for the patient on the same day for comprehensive eye exam                  Return in about 3 months (around 7/5/2021), or if symptoms worsen or fail to improve, for Follow up.    Eve Garzon MD  [facility]    Vasu Neri is a 89 year old who presents for the following health issues     HPI     Eye(s) Problem  Onset/Duration: 1 day   Description:   Location: Right eye, possibly starting under left   Pain: no  Redness: YES- right eye very red yesterday  Accompanying Signs & Symptoms:  Discharge/mattering: increase in tears   Swelling: Unable to tell   Visual changes: no, possibly slightly less clear   Fever: no  Nasal Congestion: no  Bothered by bright lights: no  History:  Trauma: no  Foreign body exposure: no  Wearing contacts: no  Precipitating or alleviating factors: None  Therapies tried and outcome: None    Family concern with receiving second covid vaccination 4/2/21, and if this could be related     VISION   Wears glasses: worn for testing  Tool used: Partida   Right eye:        10/25 (20/50)  Left eye:          10/20 (20/40)  Both: 10/20(20/40)  Visual Acuity: REFER          Review of Systems         Objective    BP (!) 154/69   Pulse 64   Temp 97.5  F (36.4  C) (Oral)   Resp 16   Wt 60.3 kg (133 lb)   SpO2 97%   BMI 25.13 kg/m    Body mass index is 25.13 kg/m .  Physical Exam  Eyes:      Pupils: Pupils are unequal.                          
Opt out

## 2024-12-26 ENCOUNTER — PATIENT OUTREACH (OUTPATIENT)
Dept: FAMILY MEDICINE | Facility: CLINIC | Age: 89
End: 2024-12-26
Payer: COMMERCIAL

## 2024-12-26 NOTE — TELEPHONE ENCOUNTER
Patient Quality Outreach    Patient is due for the following:       Topic Date Due    Zoster (Shingles) Vaccine (2 of 3) 09/08/2010    Diptheria Tetanus Pertussis (DTAP/TDAP/TD) Vaccine (2 - Td or Tdap) 12/13/2022    Flu Vaccine (1) 09/01/2024    COVID-19 Vaccine (7 - 2024-25 season) 09/01/2024       Action(s) Taken:   No follow up needed at this time.  No action needed at this time    Type of outreach:    Chart review performed, no outreach needed.    Questions for provider review:    None           Deborah Vasquez, CMA

## 2025-04-14 ENCOUNTER — DOCUMENTATION ONLY (OUTPATIENT)
Dept: GERIATRICS | Facility: CLINIC | Age: OVER 89
End: 2025-04-14
Payer: COMMERCIAL

## 2025-04-14 DIAGNOSIS — R52 PAIN: Primary | ICD-10-CM

## 2025-04-14 DIAGNOSIS — G47.00 INSOMNIA, UNSPECIFIED TYPE: ICD-10-CM

## 2025-04-14 PROBLEM — S42.001A CLOSED NONDISPLACED FRACTURE OF RIGHT CLAVICLE: Status: ACTIVE | Noted: 2025-04-07

## 2025-04-14 PROBLEM — I24.89 DEMAND ISCHEMIA OF MYOCARDIUM (H): Status: ACTIVE | Noted: 2017-09-26

## 2025-04-14 PROBLEM — I21.11 STEMI INVOLVING RIGHT CORONARY ARTERY (H): Status: ACTIVE | Noted: 2017-10-06

## 2025-04-14 PROBLEM — R15.9 COMPLETE FECAL INCONTINENCE: Status: ACTIVE | Noted: 2025-04-14

## 2025-04-14 PROBLEM — S12.601A CLOSED NONDISPLACED FRACTURE OF SEVENTH CERVICAL VERTEBRA (H): Status: ACTIVE | Noted: 2025-04-07

## 2025-04-14 PROBLEM — R15.9 INCONTINENCE OF FECES: Status: ACTIVE | Noted: 2022-06-02

## 2025-04-14 PROBLEM — I48.0 PAF (PAROXYSMAL ATRIAL FIBRILLATION) (H): Status: ACTIVE | Noted: 2017-10-15

## 2025-04-14 PROBLEM — I16.9 HYPERTENSIVE CRISIS: Status: ACTIVE | Noted: 2017-09-26

## 2025-04-14 PROBLEM — I67.4 HYPERTENSIVE ENCEPHALOPATHY: Status: ACTIVE | Noted: 2017-09-26

## 2025-04-14 PROBLEM — S12.400A: Status: ACTIVE | Noted: 2025-04-07

## 2025-04-14 PROBLEM — R19.7 DIARRHEA: Status: ACTIVE | Noted: 2025-04-14

## 2025-04-14 PROBLEM — S12.9XXA: Status: ACTIVE | Noted: 2025-04-07

## 2025-04-14 RX ORDER — OXYCODONE HYDROCHLORIDE 5 MG/1
2.5 TABLET ORAL EVERY 4 HOURS PRN
Qty: 30 TABLET | Refills: 0 | Status: SHIPPED | OUTPATIENT
Start: 2025-04-14

## 2025-04-14 RX ORDER — OXYCODONE HYDROCHLORIDE 5 MG/1
2.5 TABLET ORAL EVERY 4 HOURS PRN
COMMUNITY
End: 2025-04-14

## 2025-04-14 RX ORDER — ZOLPIDEM TARTRATE 5 MG/1
2.5 TABLET ORAL
Qty: 12 TABLET | Refills: 1 | Status: SHIPPED | OUTPATIENT
Start: 2025-04-14

## 2025-04-14 NOTE — PROGRESS NOTES
[facility] GERIATRICS  Primary Care Provider & Clinic: Santino Gordon MD, [address] [facility] [address]  Chief Complaint   Patient presents with    Hospital F/U     [facility] 4/6/2025 - 4/14/2025     [place] Medical Record Number: [id number]  Place of Service Where Encounter Took Place: [facility] (TCU/SNF) [4000]    Halle Saucedo is a 89 year old (3/8/1926), admitted to the above facility from  [facility] [place] . Hospital stay 4/6/25 through 4/14/25.    HPI:    ***    CODE STATUS/ADVANCE DIRECTIVES DISCUSSION: Special Code - DNR / DNI  Patient's living condition: lives with family, daughter   ALLERGIES:   Allergies   Allergen Reactions    Alphagan [Brimonidine Tartrate] Unknown    Azopt [Brinzolamide] Unknown    Beta Adrenergic Blockers Unknown    Bisphosphonates Unknown    Estrogens Unknown    Gemfibrozil Unknown    Trusopt [Dorzolamide] Itching and Rash    Xalatan [Latanoprost] Unknown    Zetia [Ezetimibe] Unknown    Zocor [Statins] Unknown      PAST MEDICAL HISTORY:   Past Medical History:   Diagnosis Date    Glaucoma     left and right eyes    Other and unspecified hyperlipidemia     PVD (peripheral vascular disease) 06/27/2011    Unspecified essential hypertension       PAST SURGICAL HISTORY:  has a past surgical history that includes cataract iol, rt/lt; tonsillectomy & adenoidectomy; and d & c.  FAMILY HISTORY: family history includes Glaucoma in her brother and sister; Hypertension in her brother, sister, and sister.  SOCIAL HISTORY:  reports that she has never smoked. She has never been exposed to tobacco smoke. She has never used smokeless tobacco. She reports that she does not currently use alcohol. She reports that she does not use drugs.    Post Discharge Medication Reconciliation Status:  MED REC REQUIRED{TIP  Click the link below to document or use med rec list, use list to pull in response :[id number]}  Post Medication Reconciliation Status: {MED REC  LIST:065166}    Current Outpatient Medications   Medication Sig Dispense Refill    acetaminophen (TYLENOL) 500 MG tablet Take 1,000 mg by mouth every 8 hours.      amLODIPine (NORVASC) 5 MG tablet Take 1 tablet (5 mg) by mouth daily. for blood pressure 90 tablet 3    apixaban ANTICOAGULANT (ELIQUIS ANTICOAGULANT) 2.5 MG tablet Take 1 tablet (2.5 mg) by mouth 2 times daily. 180 tablet 3    aspirin 81 MG tablet Take 81 mg by mouth daily      atorvastatin (LIPITOR) 40 MG tablet TAKE 1 TABLET(40 MG) BY MOUTH DAILY FOR CHOLESTEROL 90 tablet 3    furosemide (LASIX) 40 MG tablet Take 40 mg by mouth daily. 40 mg daily scheduled AND may take 20 mg once daily as needed for leg swelling or blood pressure greater than 150/100      isosorbide mononitrate (IMDUR) 30 MG 24 hr tablet Take 30 mg by mouth daily.      Lactobacillus-Inulin ([facility] DIGESTIVE [facility]) CAPS Take 1 capsule by mouth daily.      losartan (COZAAR) 50 MG tablet Take 1 tablet (50 mg) by mouth daily. for blood pressure 90 tablet 1    methocarbamol (ROBAXIN) 500 MG tablet Take 500 mg by mouth 4 times daily.      methocarbamol (ROBAXIN) 500 MG tablet Take 500 mg by mouth every 6 hours as needed for muscle spasms.      metoprolol succinate ER (TOPROL XL) 50 MG 24 hr tablet Take 50 mg by mouth daily.      Netarsudil-Latanoprost 0.02-0.005 % SOLN Place 1 drop Into the left eye at bedtime.      nitroGLYcerin (NITROSTAT) 0.4 MG sublingual tablet For chest pain place 1 tablet under the tongue every 5 minutes for 3 doses. If symptoms persist 5 minutes after 1st dose call 911. 20 tablet 1    omeprazole (PRILOSEC) 20 MG DR capsule TAKE 1 CAPSULE(20 MG) BY MOUTH DAILY FOR 10 DAYS THEN AS NEEDED FOR HEARTBURN/ UPSET STOMACH 90 capsule 1    oxyCODONE (ROXICODONE) 5 MG tablet Take 0.5 tablets (2.5 mg) by mouth every 4 hours as needed for severe pain. 30 tablet 0    polyvinyl alcohol-povidone PF (REFRESH) 1.4-0.6 % ophthalmic solution Place 1 drop into both eyes 3 times  "daily as needed.      potassium chloride ER (K-TAB/KLOR-CON) 10 MEQ CR tablet TAKE 1 TABLET(10 MEQ) BY MOUTH TWICE DAILY 180 tablet 1    prednisoLONE acetate (PRED FORTE) 1 % ophthalmic suspension Place 1-2 drops into the right eye 3 times daily.      psyllium (METAMUCIL) WAFR Take 2 Wafers by mouth daily.      vitamin E (TOCOPHEROL) 400 units (180 mg) capsule Take 400 Units by mouth daily.      zolpidem (AMBIEN) 5 MG tablet Take 0.5 tablets (2.5 mg) by mouth nightly as needed for sleep (max x4/week). 12 tablet 1    furosemide (LASIX) 40 MG tablet TAKE 1 TABLET BY MOUTH EVERY MORNING. MAY TAKE EXTRA 1/2 TABLET AT NOON FOR LEG SWELLING OR BLOOD PRESSURE> 150/100 (Patient not taking: Reported on 4/15/2025) 135 tablet 1    lidocaine (XYLOCAINE) 5 % external ointment Apply topically 2 times daily as needed for moderate pain. (Patient not taking: Reported on 4/15/2025) 30 g 4    metoprolol succinate ER (TOPROL XL) 25 MG 24 hr tablet Take 1 tablet (25 mg) by mouth daily. (Patient not taking: Reported on 4/15/2025) 100 tablet 3    Multiple Vitamins-Minerals (MULTIVITAMIN ADULTS 50+) TABS Take 1 tablet by mouth daily (Patient not taking: Reported on 4/15/2025)      VITAMIN D, CHOLECALCIFEROL, PO Take 2 tablets by mouth daily (Patient not taking: Reported on 4/15/2025)       No current facility-administered medications for this visit.     ROS:  {ROS FGS:316133}    Vitals:  BP (!) 181/94   Pulse 84   Temp 98.1  F (36.7  C)   Resp 16   Ht 1.575 m (5' 2\")   Wt 55.5 kg (122 lb 6.4 oz)   LMP  (LMP Unknown)   SpO2 96%   BMI 22.39 kg/m    Exam:  {Nursing home physical exam :617790}    Lab/Diagnostic Data:  {fgslab:053091}    ASSESSMENT/PLAN:  {FGS DX INITIAL:231856}    Orders:  {fgsorders:872335}    Total time spent with patient visit at the skilled nursing facility was {1/2/3/4/5:318949} including {1/2/3/4/5:375751}. Greater than 50% of total time spent with counseling and coordinating care due to ***.     Electronically " signed by: Danika Grimm***

## 2025-04-15 ENCOUNTER — DOCUMENTATION ONLY (OUTPATIENT)
Dept: GERIATRICS | Facility: CLINIC | Age: OVER 89
End: 2025-04-15

## 2025-04-15 ENCOUNTER — TRANSITIONAL CARE UNIT VISIT (OUTPATIENT)
Dept: GERIATRICS | Facility: CLINIC | Age: OVER 89
End: 2025-04-15
Payer: COMMERCIAL

## 2025-04-15 VITALS
BODY MASS INDEX: 22.52 KG/M2 | DIASTOLIC BLOOD PRESSURE: 94 MMHG | HEIGHT: 62 IN | OXYGEN SATURATION: 96 % | RESPIRATION RATE: 16 BRPM | SYSTOLIC BLOOD PRESSURE: 181 MMHG | HEART RATE: 84 BPM | WEIGHT: 122.4 LBS | TEMPERATURE: 98.1 F

## 2025-04-15 RX ORDER — METHOCARBAMOL 500 MG/1
500 TABLET, FILM COATED ORAL 4 TIMES DAILY
COMMUNITY
Start: 2025-04-15

## 2025-04-15 RX ORDER — ACETAMINOPHEN 500 MG
1000 TABLET ORAL EVERY 8 HOURS
COMMUNITY
Start: 2025-04-15

## 2025-04-15 RX ORDER — FUROSEMIDE 40 MG/1
40 TABLET ORAL DAILY
COMMUNITY
Start: 2025-04-15

## 2025-04-15 RX ORDER — METOPROLOL SUCCINATE 50 MG/1
50 TABLET, EXTENDED RELEASE ORAL DAILY
COMMUNITY
Start: 2025-04-15

## 2025-04-15 RX ORDER — METHOCARBAMOL 500 MG/1
500 TABLET, FILM COATED ORAL EVERY 6 HOURS PRN
COMMUNITY
Start: 2025-04-15

## 2025-04-15 RX ORDER — PREDNISOLONE ACETATE 10 MG/ML
1-2 SUSPENSION/ DROPS OPHTHALMIC 3 TIMES DAILY
COMMUNITY
Start: 2025-04-15

## 2025-04-15 RX ORDER — MULTIVIT WITH MINERALS/LUTEIN
400 TABLET ORAL DAILY
COMMUNITY
Start: 2025-04-15

## 2025-04-15 NOTE — LETTER
4/15/2025      Halle Saucedo  [address] [address] [facility] [address]        [facility] GERIATRICS  Primary Care Provider & Clinic: Santino Gordon MD, [address] [facility] [address]  Chief Complaint   Patient presents with    Hospital F/U     [facility] 4/6/2025 - 4/14/2025     [place] Medical Record Number: [id number]  Place of Service Where Encounter Took Place: [facility] (U/SNF) [4000]    Halle Saucedo is a 89 year old (3/8/1926), admitted to the above facility from  [facility] . Hospital stay 4/6/25 through 4/14/25.    HPI:    ***    CODE STATUS/ADVANCE DIRECTIVES DISCUSSION: Special Code - {CODE STATUS:070664}  Patient's living condition: lives with family, daughter   ALLERGIES:   Allergies   Allergen Reactions    Alphagan [Brimonidine Tartrate] Unknown    Azopt [Brinzolamide] Unknown    Beta Adrenergic Blockers Unknown    Bisphosphonates Unknown    Estrogens Unknown    Gemfibrozil Unknown    Trusopt [Dorzolamide] Itching and Rash    Xalatan [Latanoprost] Unknown    Zetia [Ezetimibe] Unknown    Zocor [Statins] Unknown      PAST MEDICAL HISTORY:   Past Medical History:   Diagnosis Date    Glaucoma     left and right eyes    Other and unspecified hyperlipidemia     PVD (peripheral vascular disease) 06/27/2011    Unspecified essential hypertension       PAST SURGICAL HISTORY:  has a past surgical history that includes cataract iol, rt/lt; tonsillectomy & adenoidectomy; and d & c.  FAMILY HISTORY: family history includes Glaucoma in her brother and sister; Hypertension in her brother, sister, and sister.  SOCIAL HISTORY:  reports that she has never smoked. She has never been exposed to tobacco smoke. She has never used smokeless tobacco. She reports that she does not currently use alcohol. She reports that she does not use drugs.    Post Discharge Medication Reconciliation Status:  MED REC REQUIRED{TIP  Click the link below to document or use med rec list, use list to  pull in response :233074}  Post Medication Reconciliation Status: {MED REC LIST:621901}    Current Outpatient Medications   Medication Sig Dispense Refill    amLODIPine (NORVASC) 5 MG tablet Take 1 tablet (5 mg) by mouth daily. for blood pressure 90 tablet 3    apixaban ANTICOAGULANT (ELIQUIS ANTICOAGULANT) 2.5 MG tablet Take 1 tablet (2.5 mg) by mouth 2 times daily. 180 tablet 3    aspirin 81 MG tablet Take 81 mg by mouth daily      atorvastatin (LIPITOR) 40 MG tablet TAKE 1 TABLET(40 MG) BY MOUTH DAILY FOR CHOLESTEROL 90 tablet 3    furosemide (LASIX) 40 MG tablet TAKE 1 TABLET BY MOUTH EVERY MORNING. MAY TAKE EXTRA 1/2 TABLET AT NOON FOR LEG SWELLING OR BLOOD PRESSURE> 150/100 135 tablet 1    isosorbide mononitrate (IMDUR) 30 MG 24 hr tablet Take 15 mg by mouth.      Lactobacillus-Inulin ([facility] DIGESTIVE [facility]ALTH) CAPS       lidocaine (XYLOCAINE) 5 % external ointment Apply topically 2 times daily as needed for moderate pain. 30 g 4    losartan (COZAAR) 50 MG tablet Take 1 tablet (50 mg) by mouth daily. for blood pressure 90 tablet 1    metoprolol succinate ER (TOPROL XL) 25 MG 24 hr tablet Take 1 tablet (25 mg) by mouth daily. 100 tablet 3    Multiple Vitamins-Minerals (MULTIVITAMIN ADULTS 50+) TABS Take 1 tablet by mouth daily (Patient not taking: Reported on 8/30/2024)      nitroGLYcerin (NITROSTAT) 0.4 MG sublingual tablet For chest pain place 1 tablet under the tongue every 5 minutes for 3 doses. If symptoms persist 5 minutes after 1st dose call 911. 20 tablet 1    omeprazole (PRILOSEC) 20 MG DR capsule TAKE 1 CAPSULE(20 MG) BY MOUTH DAILY FOR 10 DAYS THEN AS NEEDED FOR HEARTBURN/ UPSET STOMACH 90 capsule 1    polyvinyl alcohol-povidone PF (REFRESH) 1.4-0.6 % ophthalmic solution Apply 1 drop to eye      potassium chloride ER (K-TAB/KLOR-CON) 10 MEQ CR tablet TAKE 1 TABLET(10 MEQ) BY MOUTH TWICE DAILY 180 tablet 1    psyllium (METAMUCIL) WAFR Take 2 Wafers by mouth      ROCKLATAN 0.02-0.005 % SOLN INSTILL  1 DROP IN BOTH EYES EVERY EVENING      VITAMIN D, CHOLECALCIFEROL, PO Take 2 tablets by mouth daily      vitamin E 400 UNIT capsule Take 800 Units by mouth daily (Patient not taking: Reported on 8/29/2023)      zolpidem (AMBIEN) 5 MG tablet Take 0.5 tablets (2.5 mg) by mouth nightly as needed for sleep (max x4/week). 12 tablet 1     No current facility-administered medications for this visit.     ROS:  {ROS FGS:329875}    Vitals:  LMP  (LMP Unknown)   Exam:  {Nursing home physical exam :907940}    Lab/Diagnostic Data:  {fgslab:200188}    ASSESSMENT/PLAN:  {FGS DX INITIAL:177693}    Orders:  {fgsorders:660039}    Total time spent with patient visit at the skilled nursing facility was {1/2/3/4/5:148436} including {1/2/3/4/5:917612}. Greater than 50% of total time spent with counseling and coordinating care due to ***.     Electronically signed by: Danika Grimm***      Sincerely,        Antoinette Oneill NP    Electronically signed

## 2025-04-17 ENCOUNTER — LAB REQUISITION (OUTPATIENT)
Dept: LAB | Facility: CLINIC | Age: OVER 89
End: 2025-04-17
Payer: COMMERCIAL

## 2025-04-17 DIAGNOSIS — I10 ESSENTIAL (PRIMARY) HYPERTENSION: ICD-10-CM

## 2025-04-21 ENCOUNTER — TRANSITIONAL CARE UNIT VISIT (OUTPATIENT)
Dept: GERIATRICS | Facility: CLINIC | Age: OVER 89
End: 2025-04-21
Payer: COMMERCIAL

## 2025-04-21 VITALS
TEMPERATURE: 97.3 F | RESPIRATION RATE: 18 BRPM | HEART RATE: 67 BPM | WEIGHT: 122 LBS | SYSTOLIC BLOOD PRESSURE: 122 MMHG | HEIGHT: 64 IN | DIASTOLIC BLOOD PRESSURE: 52 MMHG | BODY MASS INDEX: 20.83 KG/M2 | OXYGEN SATURATION: 95 %

## 2025-04-21 DIAGNOSIS — R52 ACUTE PAIN: Primary | ICD-10-CM

## 2025-04-21 DIAGNOSIS — I10 ESSENTIAL HYPERTENSION: ICD-10-CM

## 2025-04-21 DIAGNOSIS — I50.32 CHRONIC DIASTOLIC CONGESTIVE HEART FAILURE (H): ICD-10-CM

## 2025-04-21 DIAGNOSIS — S12.9XXD CLOSED FRACTURE OF MULTIPLE CERVICAL VERTEBRAE, SUBSEQUENT ENCOUNTER: ICD-10-CM

## 2025-04-21 DIAGNOSIS — Z78.9 IMPAIRED MOBILITY AND ACTIVITIES OF DAILY LIVING: ICD-10-CM

## 2025-04-21 DIAGNOSIS — I48.0 PAF (PAROXYSMAL ATRIAL FIBRILLATION) (H): ICD-10-CM

## 2025-04-21 DIAGNOSIS — Z74.09 IMPAIRED MOBILITY AND ACTIVITIES OF DAILY LIVING: ICD-10-CM

## 2025-04-21 DIAGNOSIS — S42.001D CLOSED NONDISPLACED FRACTURE OF RIGHT CLAVICLE WITH ROUTINE HEALING, UNSPECIFIED PART OF CLAVICLE, SUBSEQUENT ENCOUNTER: ICD-10-CM

## 2025-04-21 PROCEDURE — 99309 SBSQ NF CARE MODERATE MDM 30: CPT | Performed by: NURSE PRACTITIONER

## 2025-04-21 NOTE — LETTER
4/21/2025      Halle Saucedo  [address] [address] [facility] [address]        No notes on file      Sincerely,        Antoinette Oneill NP    Electronically signed   "[dorzolamide], Xalatan [latanoprost], Zetia [ezetimibe], and Zocor [statins]    ROS:  4 point ROS including Respiratory, CV, GI and , other than that noted in the HPI,  is negative    Vitals:  /52   Pulse 67   Temp 97.3  F (36.3  C)   Resp 18   Ht 1.626 m (5' 4\")   Wt 55.3 kg (122 lb)   LMP  (LMP Unknown)   SpO2 95%   BMI 20.94 kg/m    Exam:  GENERAL APPEARANCE:  Alert, in no distress  RESP:  respiratory effort normal  CV:  Palpation and auscultation of heart done , regular rate and rhythm, no murmur, rub, or gallop, edema none  M/S:  Gait and station lying in bed.  Right shoulder bruising.   SKIN:  Inspection and palpation of skin and subcutaneous tissue at baseline  PSYCH:  insight and judgement, memory seems impaired, affect and mood normal    ASSESSMENT/PLAN:  Acute pain  Current regimen is effective.  Continue with acetaminophen, methocarbamol and oxycodone.  Encouraged patient to request help for transfers due to risk of falls.     Closed fracture of multiple cervical vertebrae, subsequent encounter  Closed nondisplaced fracture of right clavicle with routine healing, unspecified part of clavicle, subsequent encounter  Impaired mobility and activities of daily living  Admitted to the TCU for therapy and nursing care following a hospital stay.  - Continue PT/Ocupational Therapy  - Nursing for monitoring  -  following for discharge planning  - Follow up with orthopedics and neurosurgery as directed by them  - Patient declines the use of the Aspen collar and sling.     Chronic diastolic congestive heart failure (H)  Stable.  No current concerns.  Continue with Lasix, Imdur, metoprolol, losartan     Essential hypertension  Goal relaxed due to age and goals of care and fall risk.  Continue current medication regimen.     PAF (paroxysmal atrial fibrillation) (H)  Rate controlled.  Continue metoprolol.  Continue apixaban for stroke prevention    Orders:  No changes    Electronically " signed by: Antoinette Oneill NP      Sincerely,        Antoinette Oneill NP    Electronically signed

## 2025-04-22 LAB
ANION GAP SERPL CALCULATED.3IONS-SCNC: 13 MMOL/L (ref 7–15)
BASOPHILS # BLD AUTO: 0 10E3/UL (ref 0–0.2)
BASOPHILS NFR BLD AUTO: 0 %
BUN SERPL-MCNC: 12.8 MG/DL (ref 8–23)
CALCIUM SERPL-MCNC: 8.1 MG/DL (ref 8.8–10.4)
CHLORIDE SERPL-SCNC: 96 MMOL/L (ref 98–107)
CREAT SERPL-MCNC: 0.54 MG/DL (ref 0.51–0.95)
EGFRCR SERPLBLD CKD-EPI 2021: 82 ML/MIN/1.73M2
EOSINOPHIL # BLD AUTO: 0 10E3/UL (ref 0–0.7)
EOSINOPHIL NFR BLD AUTO: 0 %
ERYTHROCYTE [DISTWIDTH] IN BLOOD BY AUTOMATED COUNT: 16.3 % (ref 10–15)
GLUCOSE SERPL-MCNC: 84 MG/DL (ref 70–99)
HCO3 SERPL-SCNC: 21 MMOL/L (ref 22–29)
HCT VFR BLD AUTO: 34.7 % (ref 35–47)
HGB BLD-MCNC: 11.2 G/DL (ref 11.7–15.7)
IMM GRANULOCYTES # BLD: 0.1 10E3/UL
IMM GRANULOCYTES NFR BLD: 1 %
LYMPHOCYTES # BLD AUTO: 1.4 10E3/UL (ref 0.8–5.3)
LYMPHOCYTES NFR BLD AUTO: 14 %
MCH RBC QN AUTO: 31.6 PG (ref 26.5–33)
MCHC RBC AUTO-ENTMCNC: 32.3 G/DL (ref 31.5–36.5)
MCV RBC AUTO: 98 FL (ref 78–100)
MONOCYTES # BLD AUTO: 1.2 10E3/UL (ref 0–1.3)
MONOCYTES NFR BLD AUTO: 11 %
NEUTROPHILS # BLD AUTO: 7.9 10E3/UL (ref 1.6–8.3)
NEUTROPHILS NFR BLD AUTO: 74 %
NRBC # BLD AUTO: 0 10E3/UL
NRBC BLD AUTO-RTO: 0 /100
PLATELET # BLD AUTO: 432 10E3/UL (ref 150–450)
POTASSIUM SERPL-SCNC: 3.7 MMOL/L (ref 3.4–5.3)
RBC # BLD AUTO: 3.54 10E6/UL (ref 3.8–5.2)
SODIUM SERPL-SCNC: 130 MMOL/L (ref 135–145)
WBC # BLD AUTO: 10.6 10E3/UL (ref 4–11)

## 2025-04-22 PROCEDURE — P9603 ONE-WAY ALLOW PRORATED MILES: HCPCS | Mod: ORL | Performed by: NURSE PRACTITIONER

## 2025-04-22 PROCEDURE — 36415 COLL VENOUS BLD VENIPUNCTURE: CPT | Mod: ORL | Performed by: NURSE PRACTITIONER

## 2025-04-22 PROCEDURE — 80048 BASIC METABOLIC PNL TOTAL CA: CPT | Mod: ORL | Performed by: NURSE PRACTITIONER

## 2025-04-22 PROCEDURE — 85025 COMPLETE CBC W/AUTO DIFF WBC: CPT | Mod: ORL | Performed by: NURSE PRACTITIONER

## 2025-04-23 ENCOUNTER — TELEPHONE (OUTPATIENT)
Dept: FAMILY MEDICINE | Facility: CLINIC | Age: OVER 89
End: 2025-04-23

## 2025-04-23 ENCOUNTER — LAB REQUISITION (OUTPATIENT)
Dept: LAB | Facility: CLINIC | Age: OVER 89
End: 2025-04-23
Payer: COMMERCIAL

## 2025-04-23 ENCOUNTER — TRANSITIONAL CARE UNIT VISIT (OUTPATIENT)
Dept: GERIATRICS | Facility: CLINIC | Age: OVER 89
End: 2025-04-23
Payer: COMMERCIAL

## 2025-04-23 ENCOUNTER — APPOINTMENT (OUTPATIENT)
Dept: CT IMAGING | Facility: CLINIC | Age: OVER 89
End: 2025-04-23
Attending: EMERGENCY MEDICINE
Payer: COMMERCIAL

## 2025-04-23 ENCOUNTER — HOSPITAL ENCOUNTER (EMERGENCY)
Facility: CLINIC | Age: OVER 89
Discharge: HOME | End: 2025-04-24
Attending: EMERGENCY MEDICINE
Payer: COMMERCIAL

## 2025-04-23 VITALS
HEIGHT: 64 IN | OXYGEN SATURATION: 92 % | DIASTOLIC BLOOD PRESSURE: 78 MMHG | WEIGHT: 120 LBS | RESPIRATION RATE: 17 BRPM | TEMPERATURE: 98.1 F | HEART RATE: 84 BPM | SYSTOLIC BLOOD PRESSURE: 130 MMHG | BODY MASS INDEX: 20.49 KG/M2

## 2025-04-23 DIAGNOSIS — I50.32 CHRONIC DIASTOLIC CONGESTIVE HEART FAILURE (H): ICD-10-CM

## 2025-04-23 DIAGNOSIS — E87.0 HYPEROSMOLALITY AND HYPERNATREMIA: ICD-10-CM

## 2025-04-23 DIAGNOSIS — S12.9XXD CLOSED FRACTURE OF MULTIPLE CERVICAL VERTEBRAE, SUBSEQUENT ENCOUNTER: ICD-10-CM

## 2025-04-23 DIAGNOSIS — Z78.9 IMPAIRED MOBILITY AND ACTIVITIES OF DAILY LIVING: ICD-10-CM

## 2025-04-23 DIAGNOSIS — I48.0 PAF (PAROXYSMAL ATRIAL FIBRILLATION) (H): ICD-10-CM

## 2025-04-23 DIAGNOSIS — S42.001D CLOSED NONDISPLACED FRACTURE OF RIGHT CLAVICLE WITH ROUTINE HEALING, UNSPECIFIED PART OF CLAVICLE, SUBSEQUENT ENCOUNTER: ICD-10-CM

## 2025-04-23 DIAGNOSIS — I10 ESSENTIAL HYPERTENSION: ICD-10-CM

## 2025-04-23 DIAGNOSIS — Z74.09 IMPAIRED MOBILITY AND ACTIVITIES OF DAILY LIVING: ICD-10-CM

## 2025-04-23 DIAGNOSIS — R52 ACUTE PAIN: Primary | ICD-10-CM

## 2025-04-23 DIAGNOSIS — R41.0 DELIRIUM: ICD-10-CM

## 2025-04-23 LAB
ANION GAP SERPL CALCULATED.3IONS-SCNC: 10 MMOL/L (ref 7–15)
BASOPHILS # BLD AUTO: 0 10E3/UL (ref 0–0.2)
BASOPHILS NFR BLD AUTO: 0 %
BUN SERPL-MCNC: 9.5 MG/DL (ref 8–23)
CALCIUM SERPL-MCNC: 8.5 MG/DL (ref 8.8–10.4)
CHLORIDE SERPL-SCNC: 92 MMOL/L (ref 98–107)
CREAT SERPL-MCNC: 0.67 MG/DL (ref 0.51–0.95)
EGFRCR SERPLBLD CKD-EPI 2021: 78 ML/MIN/1.73M2
EOSINOPHIL # BLD AUTO: 0 10E3/UL (ref 0–0.7)
EOSINOPHIL NFR BLD AUTO: 0 %
ERYTHROCYTE [DISTWIDTH] IN BLOOD BY AUTOMATED COUNT: 15.8 % (ref 10–15)
GLUCOSE SERPL-MCNC: 111 MG/DL (ref 70–99)
HCO3 SERPL-SCNC: 25 MMOL/L (ref 22–29)
HCT VFR BLD AUTO: 33.5 % (ref 35–47)
HGB BLD-MCNC: 11.4 G/DL (ref 11.7–15.7)
IMM GRANULOCYTES # BLD: 0.1 10E3/UL
IMM GRANULOCYTES NFR BLD: 0 %
LYMPHOCYTES # BLD AUTO: 1.5 10E3/UL (ref 0.8–5.3)
LYMPHOCYTES NFR BLD AUTO: 13 %
MCH RBC QN AUTO: 31.8 PG (ref 26.5–33)
MCHC RBC AUTO-ENTMCNC: 34 G/DL (ref 31.5–36.5)
MCV RBC AUTO: 93 FL (ref 78–100)
MONOCYTES # BLD AUTO: 1.6 10E3/UL (ref 0–1.3)
MONOCYTES NFR BLD AUTO: 14 %
NEUTROPHILS # BLD AUTO: 8.1 10E3/UL (ref 1.6–8.3)
NEUTROPHILS NFR BLD AUTO: 72 %
NRBC # BLD AUTO: 0 10E3/UL
NRBC BLD AUTO-RTO: 0 /100
PLAT MORPH BLD: NORMAL
PLATELET # BLD AUTO: 547 10E3/UL (ref 150–450)
POTASSIUM SERPL-SCNC: 3.4 MMOL/L (ref 3.4–5.3)
RBC # BLD AUTO: 3.59 10E6/UL (ref 3.8–5.2)
RBC MORPH BLD: NORMAL
SODIUM SERPL-SCNC: 127 MMOL/L (ref 135–145)
WBC # BLD AUTO: 11.3 10E3/UL (ref 4–11)

## 2025-04-23 PROCEDURE — 80048 BASIC METABOLIC PNL TOTAL CA: CPT | Performed by: EMERGENCY MEDICINE

## 2025-04-23 PROCEDURE — 99284 EMERGENCY DEPT VISIT MOD MDM: CPT | Mod: 25

## 2025-04-23 PROCEDURE — 96360 HYDRATION IV INFUSION INIT: CPT

## 2025-04-23 PROCEDURE — 85025 COMPLETE CBC W/AUTO DIFF WBC: CPT | Performed by: EMERGENCY MEDICINE

## 2025-04-23 PROCEDURE — 87086 URINE CULTURE/COLONY COUNT: CPT | Mod: ORL | Performed by: NURSE PRACTITIONER

## 2025-04-23 PROCEDURE — 99284 EMERGENCY DEPT VISIT MOD MDM: CPT | Performed by: EMERGENCY MEDICINE

## 2025-04-23 PROCEDURE — 70450 CT HEAD/BRAIN W/O DYE: CPT

## 2025-04-23 PROCEDURE — 258N000003 HC RX IP 258 OP 636: Performed by: EMERGENCY MEDICINE

## 2025-04-23 PROCEDURE — 81001 URINALYSIS AUTO W/SCOPE: CPT | Mod: ORL | Performed by: NURSE PRACTITIONER

## 2025-04-23 PROCEDURE — 36415 COLL VENOUS BLD VENIPUNCTURE: CPT | Performed by: EMERGENCY MEDICINE

## 2025-04-23 PROCEDURE — 82043 UR ALBUMIN QUANTITATIVE: CPT | Mod: ORL | Performed by: NURSE PRACTITIONER

## 2025-04-23 RX ORDER — QUETIAPINE FUMARATE 25 MG/1
25 TABLET, FILM COATED ORAL ONCE
Status: COMPLETED | OUTPATIENT
Start: 2025-04-23 | End: 2025-04-24

## 2025-04-23 RX ADMIN — SODIUM CHLORIDE 500 ML: 0.9 INJECTION, SOLUTION INTRAVENOUS at 23:09

## 2025-04-23 ASSESSMENT — COLUMBIA-SUICIDE SEVERITY RATING SCALE - C-SSRS
2. HAVE YOU ACTUALLY HAD ANY THOUGHTS OF KILLING YOURSELF IN THE PAST MONTH?: NO
1. IN THE PAST MONTH, HAVE YOU WISHED YOU WERE DEAD OR WISHED YOU COULD GO TO SLEEP AND NOT WAKE UP?: NO
6. HAVE YOU EVER DONE ANYTHING, STARTED TO DO ANYTHING, OR PREPARED TO DO ANYTHING TO END YOUR LIFE?: NO

## 2025-04-23 ASSESSMENT — ACTIVITIES OF DAILY LIVING (ADL)
ADLS_ACUITY_SCORE: 41
ADLS_ACUITY_SCORE: 41

## 2025-04-23 NOTE — LETTER
4/23/2025      Halle Saucedo  [address] [facility] [address]        [facility] GERIATRICS  Face to Face and Medical Necessity Statement for DME Provider visit    Patient: Halle Saucedo  Gender: female  YOB: 1926  [place] Medical Record Number: [id number]  Demographics:  [address] [facility] [address] [facility] [id number] [phone] (home)   Social Security Number: [id number]  Primary Care Provider: Santino Gordon  Insurance: Payor: MEDICA / Plan: MED[employer] ADVANTAGE [employer] / Product Type: HMO /     HPI: Halle Saucedo is a 89 year old (3/8/1926), who is being seen today for a face to face provider visit at [facility] (U/SNF) [4000]. Medical necessity statement for DME included.     This patient requires the following: DME Ordered and Medical Necessity Statement   Hospital bed: fully electronic with 2 side rails  The patient does  require positioning of the body in ways not feasible with an ordinary bed due to a medical condition that is expected to last at least 1 month due to CHF, fractures. .   The patient does  require, for the alleviation of pain, postioning of the body in ways not feasible with an ordinary bed.   The patient does  require the head of bed elevated more than 30* most of the time due to CHF, chronic pulmonary disease or aspiration.  The patient does not require traction that can only be attached to a hospital bed.  The patient does  require a bed height different than a fixed height hospital bed to permit tranfers to wheelchair or standing position.   The patient does  require frequent or immediate changes in body position due to pain related to fractures. Needs for incontience cares and help with bed mobility. .       Wheelchair Documentation  Size: as written  Corresponding cushion: Yes: standard  Standard foot rests: Yes  Elevating leg rests: No  Arm rests: Yes: standard  Lap tray: No  Dose the patient use oxygen? No   Is the patient able to  "propel wheelchair? Yes I  1. The patient has mobility limitations that impairs their ability to participate in one or more mobility related activities: Toileting, Feeding, Grooming, and Bathing.  The wheelchair is suitable and necessary for use in the patient's home.  2. The patient's mobility limitations cannot be safely resolved by using a cane/walker:Yes    Reason why a cane or walker will not meet the patient's needs. (ie: balance, tolerance, level of assistance) needs help for ambulation and transfers. Poor balance.   3. The patients home has adequate access to use a manual wheelchair:Yes  4. The use of a manual wheelchair on a regular basis will improve the patients ability to participate in mobility related ADL's at home:Yes  5. The patient is willing to use a manual wheelchair at home:Yes  6. The patient has adequate upper body strength and the mental capability to safely use a manual wheelchair and/or has a caregiver that is able to assist: Yes  7. Does the patient have a lower extremity injury or edema?No    Pt needing above DME with expected length of need of 99 months due to medical necessity associated with following diagnosis:     Chronic diastolic congestive heart failure (H)  Closed fracture of multiple cervical vertebrae, subsequent encounter  Closed nondisplaced fracture of right clavicle with routine healing, unspecified part of clavicle, subsequent encounter  Impaired mobility and activities of daily living  Acute pain  Essential hypertension  PAF (paroxysmal atrial fibrillation) (H)      Past Medical History:   has a past medical history of Glaucoma, Other and unspecified hyperlipidemia, PVD (peripheral vascular disease) (06/27/2011), and Unspecified essential hypertension.    Review of Systems:  4 point ROS including Respiratory, CV, GI and , other than that noted in the HPI,  is negative    Exam:  Vitals: /78   Pulse 84   Temp 98.1  F (36.7  C)   Resp 17   Ht 1.626 m (5' 4\")   Wt " 54.4 kg (120 lb)   LMP  (LMP Unknown)   SpO2 92%   BMI 20.60 kg/m    BMI: Body mass index is 20.6 kg/m .  Alert. In no distress.     Assessment/Plan:  1. Acute pain    2. Chronic diastolic congestive heart failure (H)    3. Closed fracture of multiple cervical vertebrae, subsequent encounter    4. Closed nondisplaced fracture of right clavicle with routine healing, unspecified part of clavicle, subsequent encounter    5. Impaired mobility and activities of daily living    6. Essential hypertension    7. PAF (paroxysmal atrial fibrillation) (H)            ELECTRONICALLY SIGNED BY [place] CERTIFIED PROVIDER: Antoinette Oneill NP   NPI: [id number]    [facility] GERIATRICS  [address] [place] [address]      Sincerely,        Antoinette Oneill NP    Electronically signed   I have personally seen and examined the patient.  I fully participated in the care of this patient in conjunction with NP.  I have made amendments to the documentation where necessary, and agree with the history, physical exam, and plan as documented by the NP.    Johnathon Nieves MD  [facility]  [phone]    #Fall, Syncope, Impaired balance   #Elevated troponin   #Reactive Leukocytosis   -trauma work up negative  -f/u TTE   -ear pressure and impaired balance; wife requesting ent eval  -f/u cardio and ent

## 2025-04-24 ENCOUNTER — TRANSITIONAL CARE UNIT VISIT (OUTPATIENT)
Dept: GERIATRICS | Facility: CLINIC | Age: OVER 89
End: 2025-04-24
Payer: COMMERCIAL

## 2025-04-24 ENCOUNTER — LAB REQUISITION (OUTPATIENT)
Dept: LAB | Facility: CLINIC | Age: OVER 89
End: 2025-04-24
Payer: COMMERCIAL

## 2025-04-24 VITALS
RESPIRATION RATE: 18 BRPM | DIASTOLIC BLOOD PRESSURE: 83 MMHG | OXYGEN SATURATION: 96 % | WEIGHT: 120 LBS | SYSTOLIC BLOOD PRESSURE: 135 MMHG | HEIGHT: 64 IN | HEART RATE: 81 BPM | BODY MASS INDEX: 20.49 KG/M2 | TEMPERATURE: 98.1 F

## 2025-04-24 VITALS
RESPIRATION RATE: 16 BRPM | BODY MASS INDEX: 20.6 KG/M2 | HEART RATE: 82 BPM | TEMPERATURE: 97.7 F | SYSTOLIC BLOOD PRESSURE: 122 MMHG | WEIGHT: 120 LBS | OXYGEN SATURATION: 95 % | DIASTOLIC BLOOD PRESSURE: 69 MMHG

## 2025-04-24 DIAGNOSIS — Z78.9 IMPAIRED MOBILITY AND ACTIVITIES OF DAILY LIVING: ICD-10-CM

## 2025-04-24 DIAGNOSIS — Z74.09 IMPAIRED MOBILITY AND ACTIVITIES OF DAILY LIVING: ICD-10-CM

## 2025-04-24 DIAGNOSIS — I48.0 PAF (PAROXYSMAL ATRIAL FIBRILLATION) (H): ICD-10-CM

## 2025-04-24 DIAGNOSIS — R45.1 AGITATION: Primary | ICD-10-CM

## 2025-04-24 DIAGNOSIS — S12.9XXD CLOSED FRACTURE OF MULTIPLE CERVICAL VERTEBRAE, SUBSEQUENT ENCOUNTER: ICD-10-CM

## 2025-04-24 DIAGNOSIS — I10 ESSENTIAL HYPERTENSION: ICD-10-CM

## 2025-04-24 DIAGNOSIS — R52 ACUTE PAIN: ICD-10-CM

## 2025-04-24 DIAGNOSIS — I50.32 CHRONIC DIASTOLIC CONGESTIVE HEART FAILURE (H): ICD-10-CM

## 2025-04-24 DIAGNOSIS — R41.0 DELIRIUM: Primary | ICD-10-CM

## 2025-04-24 DIAGNOSIS — S42.001D CLOSED NONDISPLACED FRACTURE OF RIGHT CLAVICLE WITH ROUTINE HEALING, UNSPECIFIED PART OF CLAVICLE, SUBSEQUENT ENCOUNTER: ICD-10-CM

## 2025-04-24 LAB
ALBUMIN UR-MCNC: NEGATIVE MG/DL
ANION GAP SERPL CALCULATED.3IONS-SCNC: 13 MMOL/L (ref 7–15)
APPEARANCE UR: ABNORMAL
BILIRUB UR QL STRIP: NEGATIVE
BUN SERPL-MCNC: 8.5 MG/DL (ref 8–23)
CALCIUM SERPL-MCNC: 8.7 MG/DL (ref 8.8–10.4)
CAOX CRY #/AREA URNS HPF: ABNORMAL /HPF
CHLORIDE SERPL-SCNC: 96 MMOL/L (ref 98–107)
COLOR UR AUTO: YELLOW
CREAT SERPL-MCNC: 0.54 MG/DL (ref 0.51–0.95)
CREAT UR-MCNC: 37.8 MG/DL
EGFRCR SERPLBLD CKD-EPI 2021: 82 ML/MIN/1.73M2
GLUCOSE SERPL-MCNC: 91 MG/DL (ref 70–99)
GLUCOSE UR STRIP-MCNC: NEGATIVE MG/DL
HCO3 SERPL-SCNC: 20 MMOL/L (ref 22–29)
HGB UR QL STRIP: ABNORMAL
KETONES UR STRIP-MCNC: NEGATIVE MG/DL
LEUKOCYTE ESTERASE UR QL STRIP: ABNORMAL
MICROALBUMIN UR-MCNC: <12 MG/L
MICROALBUMIN/CREAT UR: NORMAL MG/G{CREAT}
NITRATE UR QL: NEGATIVE
PH UR STRIP: 6 [PH] (ref 5–7)
POTASSIUM SERPL-SCNC: 3.8 MMOL/L (ref 3.4–5.3)
RBC URINE: 1 /HPF
SODIUM SERPL-SCNC: 129 MMOL/L (ref 135–145)
SP GR UR STRIP: 1.01 (ref 1–1.03)
TRANSITIONAL EPI: <1 /HPF
UROBILINOGEN UR STRIP-MCNC: NORMAL MG/DL
WBC CLUMPS #/AREA URNS HPF: PRESENT /HPF
WBC URINE: 98 /HPF

## 2025-04-24 PROCEDURE — 80048 BASIC METABOLIC PNL TOTAL CA: CPT | Mod: ORL | Performed by: FAMILY MEDICINE

## 2025-04-24 PROCEDURE — 250N000013 HC RX MED GY IP 250 OP 250 PS 637: Performed by: EMERGENCY MEDICINE

## 2025-04-24 PROCEDURE — 99309 SBSQ NF CARE MODERATE MDM 30: CPT | Performed by: NURSE PRACTITIONER

## 2025-04-24 PROCEDURE — P9603 ONE-WAY ALLOW PRORATED MILES: HCPCS | Mod: ORL | Performed by: FAMILY MEDICINE

## 2025-04-24 PROCEDURE — 36415 COLL VENOUS BLD VENIPUNCTURE: CPT | Mod: ORL | Performed by: FAMILY MEDICINE

## 2025-04-24 RX ORDER — LORAZEPAM 0.5 MG/1
TABLET ORAL
Qty: 30 TABLET | Refills: 0 | Status: SHIPPED | OUTPATIENT
Start: 2025-04-24

## 2025-04-24 RX ADMIN — QUETIAPINE FUMARATE 25 MG: 25 TABLET ORAL at 00:00

## 2025-04-24 ASSESSMENT — ACTIVITIES OF DAILY LIVING (ADL): ADLS_ACUITY_SCORE: 41

## 2025-04-24 NOTE — ED TRIAGE NOTES
Pt arrives from [facility] for concerns of increased confusion tonight. Per EMS pt had a fall 1 week ago sustaining a clavicle fracture. Per EMS pt has increased confusion at [facility] over last few hours.      Triage Assessment (Adult)       Row Name 04/23/25 5878          Triage Assessment    Airway WDL WDL        Respiratory WDL    Respiratory WDL WDL        Skin Circulation/Temperature WDL    Skin Circulation/Temperature WDL WDL        Cardiac WDL    Cardiac WDL WDL        Peripheral/Neurovascular WDL    Peripheral Neurovascular WDL WDL        Cognitive/Neuro/Behavioral WDL    Cognitive/Neuro/Behavioral WDL X;orientation     Level of Consciousness confused     Arousal Level opens eyes spontaneously     Orientation disoriented x 4     Speech clear     Mood/Behavior hyperactive (agitated, impulsive)        Pupils (CN II)    Pupil PERRLA yes     Pupil Size Left 4 mm     Pupil Size Right 4 mm        Motor Response    LUE Motor Response purposeful motor response     RUE Motor Response purposeful motor response     LLE Motor Response purposeful motor response     RLE Motor Response purposeful motor response

## 2025-04-24 NOTE — PROGRESS NOTES
"[facility] GERIATRICS  TCU FOLLOW UP VISIT    Chief Complaint   Patient presents with    RECHECK      Place of Service where encounter took place:  [facility] (TCU/SNF) [[id number]]    Halle Saucedo  is a 89 year old  (3/8/1926), who is being seen today at the above facility to discuss progress in therapy, review nursing home EHR, recheck chronic medical problems as well as address any new concerns.     HPI:    Copied forward from previous note:   Brief Summary of Hospital Course: Patient was treated nonsurgically after a fall for a right clavicle fracture and closed fracture of multiple cervical vertebrae.  MEDICATION CHANGES: Start acetaminophen, methocarbamol, oxycodone  RECOMMENDED FOLLOW UP: Follow-up in the Neurosurgery Clinic in 4 weeks with Upright Ap/Lateral Neutral Cervical X-Rays to assess healing and stability of your fracture.     Today: Pt visited in room. Appears tired w/ minimal interactions but able to make her needs known. Nursing staff report that she was in ED yesterday d/t AMS and she has been requiring 1:1 supervision this AM d/t agitation.     Facility EHR reviewed including bm record, progress notes, vital signs and MAR.     ALLERGIES: Alphagan [brimonidine tartrate], Azopt [brinzolamide], Beta adrenergic blockers, Bisphosphonates, Estrogens, Gemfibrozil, Trusopt [dorzolamide], Xalatan [latanoprost], Zetia [ezetimibe], and Zocor [statins]    ROS:  Unobtainable secondary to cognitive impairment.     Vitals:  /83   Pulse 81   Temp 98.1  F (36.7  C)   Resp 18   Ht 1.626 m (5' 4\")   Wt 54.4 kg (120 lb)   LMP  (LMP Unknown)   SpO2 96%   BMI 20.60 kg/m    Exam:  GENERAL APPEARANCE:  Alert, in no distress  RESP:  respiratory effort and palpation of chest normal, no respiratory distress, lungs sounds clear  CV:  Palpation and auscultation of heart done , irregular rate and rhythm, no murmur, rub, or gallop, 2+ edema to BLE  ABDOMEN:  normal bowel sounds, soft, nontender, " no hepatosplenomegaly or other masses  M/S:  Gait and station appropriate; no tenderness or swelling of the joints   SKIN:  Inspection and palpation of skin and subcutaneous tissue at baseline  PSYCH:  insight and judgement, memory at baseline, affect and mood normal    ASSESSMENT/PLAN:  Delirium  Nursing staff report increased agitation in evening compatible w/ sundown syndrome. Schedule mirtazapine at HS and provide PRN lorazepam for additional support. Encourage nonpharm interventions including maintaining normal sleep/wake cycle, encourage participation w/ activities, and psychosocial support as appropriate.     Acute pain  Current regimen is effective.  Continue with acetaminophen, methocarbamol and oxycodone.  Encouraged patient to request help for transfers due to risk of falls.     Closed fracture of multiple cervical vertebrae, subsequent encounter  Closed nondisplaced fracture of right clavicle with routine healing, unspecified part of clavicle, subsequent encounter  Impaired mobility and activities of daily living  Admitted to the TCU for therapy and nursing care following a hospital stay.  - Continue PT/Ocupational Therapy  - Nursing for monitoring  -  following for discharge planning  - Follow up with orthopedics and neurosurgery as directed by them  - Patient declines the use of the Aspen collar and sling.     Chronic diastolic congestive heart failure (H)  Stable.  No current concerns.  Continue with Lasix, Imdur, metoprolol, losartan     Essential hypertension  Goal relaxed due to age and goals of care and fall risk.  Continue current medication regimen.     PAF (paroxysmal atrial fibrillation) (H)  Rate controlled.  Continue metoprolol.  Continue apixaban for stroke prevention    Orders:  - Mirtazapine 7.5 mg PO daily at HS  - Lorazepam 2 mg PO q6hrs PRN    Electronically signed by: Keli Hernandez RN    have verified the HPI/ROS, history, and personally performed the physical exam and the healthcare decision making. I agree with all elements as documented above.      Electronically signed by:  Antoinette Oneill NP

## 2025-04-24 NOTE — DISCHARGE INSTRUCTIONS
Continue with current cares.  Recheck the patient's sodium in about 1 week.  Return for worsening symptoms, fevers, difficulty breathing, or other concerns.  Otherwise follow-up with her primary clinic

## 2025-04-24 NOTE — CONFIDENTIAL NOTE
[facility] Geriatrics Telephone Encounter    HPI: 89 yo female in TCU following hospitalization for mechanical fall with right distal clavicle and cervical fractures    Reason for Call: Nursing calling to report patient is agitated and hallucinating. This is new for patient and she is requiring 1:1 for assist due to frequent self transfers and agitation. She is seeing things on the floor and children in her room. Nursing are unable to maintain 1:1 for safety due to staffing. UA was collected earlier but no results available yet; no specific urinary symptoms that patient is able to report    Onset: sudden     A/P: Acute hallucinations and agitation. Per hospital notes patient was delirious inpatient, suspect this is causing current presentation. Physical restraints used inpatient due to QTC >500, contradiction for Seroquel use. Polypharmacy with opiates, zolpidem, robaxin may be contributing.  Labs yesterday remarkable for acute hyponatremia which could also explain confusion. Possible infection considered, though less likely without localizing symptoms. Ultimately staff feel they are unable to keep patient safe from physical harm and we are unable to complete timely workup in facility tonight. I have recommended she return to ED for additional monitoring and workup.       Orders  Halle Saucedo  3/8/1926     Transfer to ED for evaluation and management of acute confusion, hallucinations.      TRAVON Tijerina CNP on 4/23/2025 at 8:51 PM

## 2025-04-24 NOTE — ED PROVIDER NOTES
History     Chief Complaint   Patient presents with    Altered Mental Status     HPI  Halle Saucedo is a 89 year old female who presents for evaluation of altered mental status.  The patient has a history of some mild dementia and is currently living at a TCU after a recent fall.  It sounds like this evening she was more confused than she normally would be.  The patient says that she feels overwhelmed.  She denies any headache.  She denies any chest pain or difficulty breathing.  No reported vomiting or diarrhea recently.  She denies abdominal pain.    I reviewed the patient's discharge summary from 4/14/2025, was admitted for evaluation after a fall with distal clavicle fracture.    I spoke with the patient's son on the phone. He said she was more confused and out of it today around 5 PM when he was finishing visiting.  He says it is not uncommon for her to become more confused in the evenings.    Allergies:  Allergies   Allergen Reactions    Alphagan [Brimonidine Tartrate] Unknown    Azopt [Brinzolamide] Unknown    Beta Adrenergic Blockers Unknown    Bisphosphonates Unknown    Estrogens Unknown    Gemfibrozil Unknown    Trusopt [Dorzolamide] Itching and Rash    Xalatan [Latanoprost] Unknown    Zetia [Ezetimibe] Unknown    Zocor [Statins] Unknown       Problem List:    Patient Active Problem List    Diagnosis Date Noted    Complete fecal incontinence 04/14/2025     Priority: Medium    Diarrhea 04/14/2025     Priority: Medium    Closed fracture of multiple cervical vertebrae (H) 04/07/2025     Priority: Medium    Closed nondisplaced fracture of right clavicle 04/07/2025     Priority: Medium    Closed displaced fracture of fifth cervical vertebra (H) 04/07/2025     Priority: Medium    Closed nondisplaced fracture of seventh cervical vertebra (H) 04/07/2025     Priority: Medium    Memory difficulties 08/29/2023     Priority: Medium    Chronic diastolic congestive heart failure (H) 08/19/2022     Priority: Medium     Incontinence of feces 06/02/2022     Priority: Medium    Coronary artery disease involving native coronary artery of native heart with unstable angina pectoris (H) 12/13/2018     Priority: Medium    PAF (paroxysmal atrial fibrillation) (H) 10/15/2017     Priority: Medium    ST elevation myocardial infarction (STEMI), unspecified artery (H) 10/15/2017     Priority: Medium    STEMI involving right coronary artery (H) 10/06/2017     Priority: Medium    Stenosis of carotid artery, unspecified laterality 10/02/2017     Priority: Medium    ERRONEOUS ENCOUNTER--DISREGARD 09/29/2017     Priority: Medium    Hypertensive encephalopathy 09/26/2017     Priority: Medium    Hypertensive crisis 09/26/2017     Priority: Medium    Demand ischemia of myocardium (H) 09/26/2017     Priority: Medium    Seasonal allergic rhinitis, unspecified allergic rhinitis trigger 04/07/2017     Priority: Medium    Other specified hypothyroidism 10/06/2016     Priority: Medium    Hypothyroidism 04/01/2016     Priority: Medium    Melanoma of skin (H) 08/21/2015     Priority: Medium    Gastroesophageal reflux disease without esophagitis 04/02/2015     Priority: Medium    Headache 10/03/2013     Priority: Medium     Problem list name updated by automated process. 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     Priority: Medium    PVD (peripheral vascular disease) 06/27/2011     Priority: Medium    Hyperlipidemia LDL goal <130 10/31/2010     Priority: Medium    COAG (chronic open-angle glaucoma) 12/28/2009     Priority: Medium    After-cataract obscuring vision 10/01/2009     Priority: Medium     Epic      Lens replaced 10/01/2009     Priority: Medium     Lens Replaced by Other Means both eyes      Glaucoma 04/22/2004     Priority: Medium     Epic      Hearing loss 04/01/2004     Priority: Medium     Epic      Essential hypertension 04/09/2003     Priority: Medium     Epic       Disorder of bone and cartilage 04/09/2003     Priority: Medium     [facility]          Past Medical History:    Past Medical History:   Diagnosis Date    Glaucoma     Other and unspecified hyperlipidemia     PVD (peripheral vascular disease) 06/27/2011    Unspecified essential hypertension        Past Surgical History:    Past Surgical History:   Procedure Laterality Date    CATARACT IOL, RT/LT      D & C      TONSILLECTOMY & ADENOIDECTOMY      childhood       Family History:    Family History   Problem Relation Age of Onset    Hypertension Brother     Glaucoma Brother     Hypertension Sister     Glaucoma Sister     Hypertension Sister        Social History:  Marital Status:   [5]  Social History     Tobacco Use    Smoking status: Never     Passive exposure: Never    Smokeless tobacco: Never   Vaping Use    Vaping status: Never Used   Substance Use Topics    Alcohol use: Not Currently     Comment: rare    Drug use: No        Medications:    acetaminophen (TYLENOL) 500 MG tablet  amLODIPine (NORVASC) 5 MG tablet  apixaban ANTICOAGULANT (ELIQUIS ANTICOAGULANT) 2.5 MG tablet  aspirin 81 MG tablet  atorvastatin (LIPITOR) 40 MG tablet  furosemide (LASIX) 40 MG tablet  isosorbide mononitrate (IMDUR) 30 MG 24 hr tablet  Lactobacillus-Inulin ([facility] DIGESTIVE [facility]) CAPS  losartan (COZAAR) 50 MG tablet  methocarbamol (ROBAXIN) 500 MG tablet  methocarbamol (ROBAXIN) 500 MG tablet  metoprolol succinate ER (TOPROL XL) 50 MG 24 hr tablet  Netarsudil-Latanoprost 0.02-0.005 % SOLN  nitroGLYcerin (NITROSTAT) 0.4 MG sublingual tablet  omeprazole (PRILOSEC) 20 MG DR capsule  oxyCODONE (ROXICODONE) 5 MG tablet  polyvinyl alcohol-povidone PF (REFRESH) 1.4-0.6 % ophthalmic solution  potassium chloride ER (K-TAB/KLOR-CON) 10 MEQ CR tablet  prednisoLONE acetate (PRED FORTE) 1 % ophthalmic suspension  psyllium (METAMUCIL) WAFR  vitamin E (TOCOPHEROL) 400 units (180 mg) capsule  zolpidem (AMBIEN) 5 MG tablet          Review of  Systems    Physical Exam   BP: (!) 165/99  Pulse: 97  Temp: 97.7  F (36.5  C)  Resp: 18  Weight: 54.4 kg (120 lb)  SpO2: 96 %      Physical Exam  Constitutional:       General: She is not in acute distress.     Appearance: She is well-developed.   HENT:      Head: Normocephalic and atraumatic.   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   Pulmonary:      Effort: No respiratory distress.      Breath sounds: No stridor.   Chest:      Comments: Bruising across the right anterior chest wall consistent with recent clavicle fracture  Skin:     General: Skin is warm and dry.   Neurological:      Mental Status: She is alert.      Cranial Nerves: Cranial nerves 2-12 are intact.      Motor: No abnormal muscle tone.      Comments: She is able to follow commands.  She says she feels overwhelmed.  She does have some trouble following the conversation and is very hard of hearing.  Moving all 4 extremities equally         ED Course        Procedures              Critical Care time:  none     None         Results for orders placed or performed during the hospital encounter of 04/23/25 (from the past 24 hours)   Basic metabolic panel   Result Value Ref Range    Sodium 127 (L) 135 - 145 mmol/L    Potassium 3.4 3.4 - 5.3 mmol/L    Chloride 92 (L) 98 - 107 mmol/L    Carbon Dioxide (CO2) 25 22 - 29 mmol/L    Anion Gap 10 7 - 15 mmol/L    Urea Nitrogen 9.5 8.0 - 23.0 mg/dL    Creatinine 0.67 0.51 - 0.95 mg/dL    GFR Estimate 78 >60 mL/min/1.73m2    Calcium 8.5 (L) 8.8 - 10.4 mg/dL    Glucose 111 (H) 70 - 99 mg/dL   CBC with platelets, differential    Narrative    The following orders were created for panel order CBC with platelets, differential.  Procedure                               Abnormality         Status                     ---------                               -----------         ------                     CBC with platelets and ...[[id number]]  Abnormal             Final result               RBC and Platelet Morpho...[[id number]]                      Final result                 Please view results for these tests on the individual orders.   CBC with platelets and differential   Result Value Ref Range    WBC Count 11.3 (H) 4.0 - 11.0 10e3/uL    RBC Count 3.59 (L) 3.80 - 5.20 10e6/uL    Hemoglobin 11.4 (L) 11.7 - 15.7 g/dL    Hematocrit 33.5 (L) 35.0 - 47.0 %    MCV 93 78 - 100 fL    MCH 31.8 26.5 - 33.0 pg    MCHC 34.0 31.5 - 36.5 g/dL    RDW 15.8 (H) 10.0 - 15.0 %    Platelet Count 547 (H) 150 - 450 10e3/uL    % Neutrophils 72 %    % Lymphocytes 13 %    % Monocytes 14 %    % Eosinophils 0 %    % Basophils 0 %    % Immature Granulocytes 0 %    NRBCs per 100 WBC 0 <1 /100    Absolute Neutrophils 8.1 1.6 - 8.3 10e3/uL    Absolute Lymphocytes 1.5 0.8 - 5.3 10e3/uL    Absolute Monocytes 1.6 (H) 0.0 - 1.3 10e3/uL    Absolute Eosinophils 0.0 0.0 - 0.7 10e3/uL    Absolute Basophils 0.0 0.0 - 0.2 10e3/uL    Absolute Immature Granulocytes 0.1 <=0.4 10e3/uL    Absolute NRBCs 0.0 10e3/uL   RBC and Platelet Morphology   Result Value Ref Range    RBC Morphology Confirmed RBC Indices     Platelet Assessment  Automated Count Confirmed. Platelet morphology is normal.     Automated Count Confirmed. Platelet morphology is normal.   CT Head w/o Contrast    Narrative    EXAM: CT HEAD W/O CONTRAST  LOCATION: [facility]  DATE: 4/23/2025    INDICATION: increased confusion  COMPARISON: 4/7/2025  TECHNIQUE: Routine CT Head without IV contrast. Multiplanar reformats. Dose reduction techniques were used.    FINDINGS:  INTRACRANIAL CONTENTS: No intracranial hemorrhage, extraaxial collection, or mass effect.  No CT evidence of acute infarct. Mild to moderate presumed chronic small vessel ischemic changes. Mild to moderate generalized volume loss. No hydrocephalus.     VISUALIZED ORBITS/SINUSES/MASTOIDS: No intraorbital abnormality. No paranasal sinus mucosal disease. No  middle ear or mastoid effusion.    BONES/SOFT TISSUES: No acute abnormality.      Impression    IMPRESSION:  No acute intracranial process.         Medications   sodium chloride 0.9% BOLUS 500 mL (0 mLs Intravenous Stopped 4/23/25 2885)   QUEtiapine (SEROquel) tablet 25 mg (25 mg Oral $Given 4/24/25 0000)       Assessments & Plan (with Medical Decision Making)   89-year-old female with recent fall and clavicle fracture who presents from TCU for increased confusion.  The patient reports that she feels overwhelmed but otherwise is feeling okay.  High risk presentation given the patient's history of recent fall and anticoagulation status.  CT of the head obtained, images interpreted independently as well as radiology reviewed, no signs of intracranial hemorrhage.  The patient does have mild hyponatremia of 127 and I reviewed her recent sodium from 4/22/2025 and it was 130 at that time, her sodium from 4/12/2025 was 135 so it has had a decline over the past week.  She is given a small bolus of IV fluids.  I do not believe that this hyponatremia is causing her symptoms however I do not want it to drift down further than this.  At this time I believe this likely represents delirium that is related to her dementia and exacerbated by her recent fall and moved to a TCU adding to her confusion.  At this time believe she is safe to discharge back to her TCU with instructions to continue cares.  She was given dose of quetiapine here to help with sleep and the family will discuss options for treatment of her agitation in the evenings with the staff at the TCO.    I have reviewed the nursing notes.    I have reviewed the findings, diagnosis, plan and need for follow up with the patient.           Medical Decision Making  The patient's presentation was of high complexity (an acute health issue posing potential threat to life or bodily function).    The patient's evaluation involved:  an assessment requiring an independent  historian (see separate area of note for details)  review of external note(s) from 1 sources (see separate area of note for details)  ordering and/or review of 3+ test(s) in this encounter (see separate area of note for details)  independent interpretation of testing performed by another health professional (see separate area of note for details)    The patient's management necessitated moderate risk (prescription drug management including medications given in the ED).        Discharge Medication List as of 4/24/2025 12:09 AM          Final diagnoses:   Delirium       4/23/2025   [facility] EMERGENCY DEPT       Sidney Bustos MD  04/24/25 0206

## 2025-04-24 NOTE — PROGRESS NOTES
[facility] GERIATRICS  Face to Face and Medical Necessity Statement for DME Provider visit    Patient: Halle Saucedo  Gender: female  YOB: 1926  [facility] Medical Record Number: [id number]  Demographics:  [address] [id number] [phone] (home)   Social Security Number: [id number]  Primary Care Provider: Santino Gordon  Insurance: Payor: MEDICA / Plan: MED[employer] ADVANT[employer] SOLUTIONS / Product Type: HMO /     HPI: Halle Saucedo is a 89 year old (3/8/1926), who is being seen today for a face to face provider visit at [facility] (TCU/SNF) [4000]. Medical necessity statement for DME included.     This patient requires the following: DME Ordered and Medical Necessity Statement   Hospital bed: fully electronic with 2 side rails  The patient does  require positioning of the body in ways not feasible with an ordinary bed due to a medical condition that is expected to last at least 1 month due to CHF, fractures. .   The patient does  require, for the alleviation of pain, postioning of the body in ways not feasible with an ordinary bed.   The patient does  require the head of bed elevated more than 30* most of the time due to CHF, chronic pulmonary disease or aspiration.  The patient does not require traction that can only be attached to a hospital bed.  The patient does  require a bed height different than a fixed height hospital bed to permit tranfers to wheelchair or standing position.   The patient does  require frequent or immediate changes in body position due to pain related to fractures. Needs for incontience cares and help with bed mobility. .       Wheelchair Documentation  Size: as written  Corresponding cushion: Yes: standard  Standard foot rests: Yes  Elevating leg rests: No  Arm rests: Yes: standard  Lap tray: No  Dose the patient use oxygen? No   Is the patient able to propel wheelchair? Yes I  1. The patient has mobility limitations that impairs  "their ability to participate in one or more mobility related activities: Toileting, Feeding, Grooming, and Bathing.  The wheelchair is suitable and necessary for use in the patient's home.  2. The patient's mobility limitations cannot be safely resolved by using a cane/walker:Yes    Reason why a cane or walker will not meet the patient's needs. (ie: balance, tolerance, level of assistance) needs help for ambulation and transfers. Poor balance.   3. The patients home has adequate access to use a manual wheelchair:Yes  4. The use of a manual wheelchair on a regular basis will improve the patients ability to participate in mobility related ADL's at home:Yes  5. The patient is willing to use a manual wheelchair at home:Yes  6. The patient has adequate upper body strength and the mental capability to safely use a manual wheelchair and/or has a caregiver that is able to assist: Yes  7. Does the patient have a lower extremity injury or edema?No    Pt needing above DME with expected length of need of 99 months due to medical necessity associated with following diagnosis:     Chronic diastolic congestive heart failure (H)  Closed fracture of multiple cervical vertebrae, subsequent encounter  Closed nondisplaced fracture of right clavicle with routine healing, unspecified part of clavicle, subsequent encounter  Impaired mobility and activities of daily living  Acute pain  Essential hypertension  PAF (paroxysmal atrial fibrillation) (H)      Past Medical History:   has a past medical history of Glaucoma, Other and unspecified hyperlipidemia, PVD (peripheral vascular disease) (06/27/2011), and Unspecified essential hypertension.    Review of Systems:  4 point ROS including Respiratory, CV, GI and , other than that noted in the HPI,  is negative    Exam:  Vitals: /78   Pulse 84   Temp 98.1  F (36.7  C)   Resp 17   Ht 1.626 m (5' 4\")   Wt 54.4 kg (120 lb)   LMP  (LMP Unknown)   SpO2 92%   BMI 20.60 kg/m    BMI: " Body mass index is 20.6 kg/m .  Alert. In no distress.     Assessment/Plan:  1. Acute pain    2. Chronic diastolic congestive heart failure (H)    3. Closed fracture of multiple cervical vertebrae, subsequent encounter    4. Closed nondisplaced fracture of right clavicle with routine healing, unspecified part of clavicle, subsequent encounter    5. Impaired mobility and activities of daily living    6. Essential hypertension    7. PAF (paroxysmal atrial fibrillation) (H)            ELECTRONICALLY SIGNED BY [place] CERTIFIED PROVIDER: Antoinette Oneill NP   NPI: [id number]    [facility] GERIATRICS  [address] [place] [address]

## 2025-04-24 NOTE — LETTER
4/24/2025      Halle Saucedo  [address] [facility] [address]        No notes on file      Sincerely,        Antoinette Oneill NP    Electronically signed   murmur, rub, or gallop, 2+ edema to BLE  ABDOMEN:  normal bowel sounds, soft, nontender, no hepatosplenomegaly or other masses  M/S:  Gait and station appropriate; no tenderness or swelling of the joints   SKIN:  Inspection and palpation of skin and subcutaneous tissue at baseline  PSYCH:  insight and judgement, memory at baseline, affect and mood normal    ASSESSMENT/PLAN:  Delirium  Nursing staff report increased agitation in evening compatible w/ sundown syndrome. Schedule mirtazapine at HS and provide PRN lorazepam for additional support. Encourage nonpharm interventions including maintaining normal sleep/wake cycle, encourage participation w/ activities, and psychosocial support as appropriate.     Acute pain  Current regimen is effective.  Continue with acetaminophen, methocarbamol and oxycodone.  Encouraged patient to request help for transfers due to risk of falls.     Closed fracture of multiple cervical vertebrae, subsequent encounter  Closed nondisplaced fracture of right clavicle with routine healing, unspecified part of clavicle, subsequent encounter  Impaired mobility and activities of daily living  Admitted to the TCU for therapy and nursing care following a hospital stay.  - Continue PT/Ocupational Therapy  - Nursing for monitoring  -  following for discharge planning  - Follow up with orthopedics and neurosurgery as directed by them  - Patient declines the use of the Aspen collar and sling.     Chronic diastolic congestive heart failure (H)  Stable.  No current concerns.  Continue with Lasix, Imdur, metoprolol, losartan     Essential hypertension  Goal relaxed due to age and goals of care and fall risk.  Continue current medication regimen.     PAF (paroxysmal atrial fibrillation) (H)  Rate controlled.  Continue metoprolol.  Continue apixaban for stroke prevention    Orders:  - Mirtazapine 7.5 mg PO daily at HS  - Lorazepam 2 mg PO q6hrs PRN    I (the provider) was present with  professional student who participated in this service and in the documentation of this service. I have verified the HPI/ROS, history, and personally performed the physical exam and the healthcare decision making. I agree with all elements as documented above.      Electronically signed by:  Antoinette Oneill NP      Sincerely,        Antoinette Oneill NP    Electronically signed

## 2025-04-25 ENCOUNTER — LAB REQUISITION (OUTPATIENT)
Dept: LAB | Facility: CLINIC | Age: OVER 89
End: 2025-04-25
Payer: COMMERCIAL

## 2025-04-25 DIAGNOSIS — E87.1 HYPO-OSMOLALITY AND HYPONATREMIA: ICD-10-CM

## 2025-04-25 LAB — BACTERIA UR CULT: NORMAL

## 2025-04-28 LAB
ANION GAP SERPL CALCULATED.3IONS-SCNC: 10 MMOL/L (ref 7–15)
BUN SERPL-MCNC: 12.6 MG/DL (ref 8–23)
CALCIUM SERPL-MCNC: 8.4 MG/DL (ref 8.8–10.4)
CHLORIDE SERPL-SCNC: 101 MMOL/L (ref 98–107)
CREAT SERPL-MCNC: 0.62 MG/DL (ref 0.51–0.95)
EGFRCR SERPLBLD CKD-EPI 2021: 80 ML/MIN/1.73M2
GLUCOSE SERPL-MCNC: 126 MG/DL (ref 70–99)
HCO3 SERPL-SCNC: 25 MMOL/L (ref 22–29)
POTASSIUM SERPL-SCNC: 3.9 MMOL/L (ref 3.4–5.3)
SODIUM SERPL-SCNC: 136 MMOL/L (ref 135–145)

## 2025-04-28 PROCEDURE — 36415 COLL VENOUS BLD VENIPUNCTURE: CPT | Mod: ORL | Performed by: FAMILY MEDICINE

## 2025-04-28 PROCEDURE — 80048 BASIC METABOLIC PNL TOTAL CA: CPT | Mod: ORL | Performed by: FAMILY MEDICINE

## 2025-04-28 PROCEDURE — P9604 ONE-WAY ALLOW PRORATED TRIP: HCPCS | Mod: ORL | Performed by: FAMILY MEDICINE

## 2025-04-28 NOTE — PROGRESS NOTES
[facility] GERIATRICS  TCU FOLLOW UP VISIT    Chief Complaint   Patient presents with    RECHECK      Place of Service where encounter took place:  [facility] (TCU/SNF) [4000]    Halle Saucedo  is a 89 year old  (3/8/1926), who is being seen today at the above facility to discuss progress in therapy, review nursing home EHR, recheck chronic medical problems as well as address any new concerns.       HPI:    Copied forward from previous note: admitted to the above facility from  [facility] . Hospital stay 4/6/25 through 4/14/25.  Patient's living condition: lives with family, daughter   Brief Summary of Hospital Course: Patient was treated nonsurgically after a fall for a right clavicle fracture and closed fracture of multiple cervical vertebrae.  MEDICATION CHANGES: Start acetaminophen, methocarbamol, oxycodone  RECOMMENDED FOLLOW UP: Follow-up in the Neurosurgery Clinic in 4 weeks with Upright Ap/Lateral Neutral Cervical X-Rays to assess healing and stability of your fracture.   Follow-up in trauma clinic in 6 weeks for right clavicle fracture  Updates on Status Since Skilled nursing Admission: 4/15 Change vitamin e tablet to 1/day  Check CBC, BMP in 1 week for hypertension    Today: patient is seen resting in bed. She denies any concerns. But she does seem a little more fatigued than during my previous visit. Nurse reports loose stools but she has a hx of this and uses imodium per her dtr.  Therapy reports upper body dressing with min assist lower body dressing with max assist, toileting with moderate assist.  Able to ambulate 125 feet.  Has mild shortness of breath with ambulation.    Facility EHR reviewed including bm record, progress notes, vital signs and MAR.     ALLERGIES: Alphagan [brimonidine tartrate], Azopt [brinzolamide], Beta adrenergic blockers, Bisphosphonates, Estrogens, Gemfibrozil, Trusopt [dorzolamide], Xalatan [latanoprost], Zetia [ezetimibe], and Zocor  "[statins]    ROS:  4 point ROS including Respiratory, CV, GI and , other than that noted in the HPI,  is negative    Vitals:  /52   Pulse 67   Temp 97.3  F (36.3  C)   Resp 18   Ht 1.626 m (5' 4\")   Wt 55.3 kg (122 lb)   LMP  (LMP Unknown)   SpO2 95%   BMI 20.94 kg/m    Exam:  GENERAL APPEARANCE:  Alert, in no distress  RESP:  respiratory effort normal  CV:  Palpation and auscultation of heart done , regular rate and rhythm, no murmur, rub, or gallop, edema none  M/S:  Gait and station lying in bed.  Right shoulder bruising.   SKIN:  Inspection and palpation of skin and subcutaneous tissue at baseline  PSYCH:  insight and judgement, memory seems impaired, affect and mood normal    ASSESSMENT/PLAN:  Acute pain  Current regimen is effective.  Continue with acetaminophen, methocarbamol and oxycodone.  Encouraged patient to request help for transfers due to risk of falls.     Closed fracture of multiple cervical vertebrae, subsequent encounter  Closed nondisplaced fracture of right clavicle with routine healing, unspecified part of clavicle, subsequent encounter  Impaired mobility and activities of daily living  Admitted to the TCU for therapy and nursing care following a hospital stay.  - Continue PT/Ocupational Therapy  - Nursing for monitoring  -  following for discharge planning  - Follow up with orthopedics and neurosurgery as directed by them  - Patient declines the use of the Aspen collar and sling.     Chronic diastolic congestive heart failure (H)  Stable.  No current concerns.  Continue with Lasix, Imdur, metoprolol, losartan     Essential hypertension  Goal relaxed due to age and goals of care and fall risk.  Continue current medication regimen.     PAF (paroxysmal atrial fibrillation) (H)  Rate controlled.  Continue metoprolol.  Continue apixaban for stroke prevention    Orders:  No changes    Electronically signed by: Antoinette Oneill NP    "

## 2025-04-29 ENCOUNTER — TRANSITIONAL CARE UNIT VISIT (OUTPATIENT)
Dept: GERIATRICS | Facility: CLINIC | Age: OVER 89
End: 2025-04-29
Payer: COMMERCIAL

## 2025-04-29 ENCOUNTER — LAB REQUISITION (OUTPATIENT)
Dept: LAB | Facility: CLINIC | Age: OVER 89
End: 2025-04-29
Payer: COMMERCIAL

## 2025-04-29 VITALS
WEIGHT: 116.8 LBS | BODY MASS INDEX: 19.94 KG/M2 | HEART RATE: 66 BPM | HEIGHT: 64 IN | DIASTOLIC BLOOD PRESSURE: 81 MMHG | TEMPERATURE: 97.9 F | RESPIRATION RATE: 16 BRPM | OXYGEN SATURATION: 96 % | SYSTOLIC BLOOD PRESSURE: 160 MMHG

## 2025-04-29 DIAGNOSIS — S42.001D CLOSED NONDISPLACED FRACTURE OF RIGHT CLAVICLE WITH ROUTINE HEALING, UNSPECIFIED PART OF CLAVICLE, SUBSEQUENT ENCOUNTER: ICD-10-CM

## 2025-04-29 DIAGNOSIS — S12.9XXD CLOSED FRACTURE OF MULTIPLE CERVICAL VERTEBRAE, SUBSEQUENT ENCOUNTER: ICD-10-CM

## 2025-04-29 DIAGNOSIS — E87.1 HYPONATREMIA: ICD-10-CM

## 2025-04-29 DIAGNOSIS — Z78.9 IMPAIRED MOBILITY AND ACTIVITIES OF DAILY LIVING: ICD-10-CM

## 2025-04-29 DIAGNOSIS — R52 ACUTE PAIN: ICD-10-CM

## 2025-04-29 DIAGNOSIS — I48.0 PAF (PAROXYSMAL ATRIAL FIBRILLATION) (H): ICD-10-CM

## 2025-04-29 DIAGNOSIS — E87.0 HYPEROSMOLALITY AND HYPERNATREMIA: ICD-10-CM

## 2025-04-29 DIAGNOSIS — I50.32 CHRONIC DIASTOLIC CONGESTIVE HEART FAILURE (H): ICD-10-CM

## 2025-04-29 DIAGNOSIS — I10 ESSENTIAL HYPERTENSION: ICD-10-CM

## 2025-04-29 DIAGNOSIS — Z74.09 IMPAIRED MOBILITY AND ACTIVITIES OF DAILY LIVING: ICD-10-CM

## 2025-04-29 DIAGNOSIS — R41.0 DELIRIUM: Primary | ICD-10-CM

## 2025-04-29 DIAGNOSIS — F41.9 ANXIETY: Primary | ICD-10-CM

## 2025-04-29 PROCEDURE — 99309 SBSQ NF CARE MODERATE MDM 30: CPT | Performed by: NURSE PRACTITIONER

## 2025-04-29 NOTE — LETTER
4/29/2025      Halle Saucedo  [address] [facility] [address]        No notes on file      Sincerely,        Antoinette Oneill NP    Electronically signed   recliner  SKIN:  Inspection and palpation of skin and subcutaneous tissue at baseline  PSYCH:  insight and judgement, memory improved from last week, affect and mood normal    ASSESSMENT/PLAN:  Anxiety   Improved with addition of mirtazapine.  On salt tablets for hyponatremia.  Recommend recheck of sodium in 2 weeks    Hyponatremia  Started on 1 g of sodium per day.  Recommend recheck in 2 weeks called daughter and updated with plan of care..     Acute pain  Current regimen is effective.  Continue with acetaminophen,   Encouraged patient to request help for transfers due to risk of falls.     Closed fracture of multiple cervical vertebrae, subsequent encounter  Closed nondisplaced fracture of right clavicle with routine healing, unspecified part of clavicle, subsequent encounter  Impaired mobility and activities of daily living  Admitted to the TCU for therapy and nursing care following a hospital stay.  - Continue PT/Ocupational Therapy  - Nursing for monitoring  -  following for discharge planning  - Follow up with orthopedics and neurosurgery as directed by them  - Patient declines the use of the Aspen collar and sling.     Chronic diastolic congestive heart failure (H)  Stable.  No current concerns.  Continue with Lasix, Imdur, metoprolol, losartan     Essential hypertension  Goal relaxed due to age and goals of care and fall risk.  Continue current medication regimen.     PAF (paroxysmal atrial fibrillation) (H)  Rate controlled.  Continue metoprolol.  Continue apixaban for stroke prevention    Orders:  No changes    Electronically signed by: Antoinette Oneill NP      Sincerely,        Antoinette Oneill NP    Electronically signed

## 2025-05-01 ENCOUNTER — TELEPHONE (OUTPATIENT)
Dept: FAMILY MEDICINE | Facility: CLINIC | Age: OVER 89
End: 2025-05-01

## 2025-05-01 ENCOUNTER — LAB REQUISITION (OUTPATIENT)
Dept: LAB | Facility: CLINIC | Age: OVER 89
End: 2025-05-01
Payer: COMMERCIAL

## 2025-05-01 DIAGNOSIS — E87.0 HYPEROSMOLALITY AND HYPERNATREMIA: ICD-10-CM

## 2025-05-01 LAB — SODIUM SERPL-SCNC: 139 MMOL/L (ref 135–145)

## 2025-05-01 PROCEDURE — P9604 ONE-WAY ALLOW PRORATED TRIP: HCPCS | Mod: ORL | Performed by: FAMILY MEDICINE

## 2025-05-01 PROCEDURE — 84295 ASSAY OF SERUM SODIUM: CPT | Mod: ORL | Performed by: FAMILY MEDICINE

## 2025-05-01 PROCEDURE — 36415 COLL VENOUS BLD VENIPUNCTURE: CPT | Mod: ORL | Performed by: FAMILY MEDICINE

## 2025-05-01 NOTE — TELEPHONE ENCOUNTER
Called and spoke to Caron. Relayed Dr. Gordon's message and Caron was understanding. States that the senior facility she will be moving to has providers so patient will transition to care with them once enrolled. No further questions at this time.        Randy Sims

## 2025-05-01 NOTE — TELEPHONE ENCOUNTER
General Call      Reason for Call:  Home care orders    What are your questions or concerns:    Halle is discharging from [facility] transitional care unit today, and Caron with [facility] is wanting know if you would be willing to sign home care orders and follow home care episode.   Last office visit was 8/2024  Halle's daughter said it is hard to get her into clinic.  Will you need an appointment for home care orders to be signed? And if so, is a video visit sufficient?    [facility] is looking to get out to see patient as early as Monday, June 5th.     Date of last appointment with provider: 8/2024    Could we send this information to you in [employer] or would you prefer to receive a phone call?:   Caron @ [facility]- [phone]   OK to leave detailed message

## 2025-05-01 NOTE — TELEPHONE ENCOUNTER
TC TEAM: Please relay the message below to Caron and reach out to the patient to assist making the needed appointment.  Thank you. Lashon Glass R.N.

## 2025-05-01 NOTE — TELEPHONE ENCOUNTER
I haven't seen since last summer and a lot has happened since. I think legally I need to see patient to sign any home care orders.  Ok for any verbal orders needed at this time but needs follow-up appointment asap. Santino Gordon MD

## 2025-05-04 DIAGNOSIS — I10 HYPERTENSION GOAL BP (BLOOD PRESSURE) < 150/90: ICD-10-CM

## 2025-05-05 RX ORDER — POTASSIUM CHLORIDE 750 MG/1
10 TABLET, EXTENDED RELEASE ORAL
Qty: 180 TABLET | Refills: 0 | Status: SHIPPED | OUTPATIENT
Start: 2025-05-05

## 2025-05-07 NOTE — PROGRESS NOTES
"[facility] GERIATRICS  TCU FOLLOW UP VISIT    Chief Complaint   Patient presents with    RECHECK      Place of Service where encounter took place:  [facility] (TCU/SNF) [4000]    Halle Saucedo  is a 89 year old  (3/8/1926), who is being seen today at the above facility to discuss progress in therapy, review nursing home EHR, recheck chronic medical problems as well as address any new concerns.     HPI:    Copied forward from previous note:   Brief Summary of Hospital Course: Patient was treated nonsurgically after a fall for a right clavicle fracture and closed fracture of multiple cervical vertebrae.  MEDICATION CHANGES: Start acetaminophen, methocarbamol, oxycodone  RECOMMENDED FOLLOW UP: Follow-up in the Neurosurgery Clinic in 4 weeks with Upright Ap/Lateral Neutral Cervical X-Rays to assess healing and stability of your fracture.   4/24 - Mirtazapine 7.5 mg PO daily at HS  - Lorazepam 0.5 mg PO q6hrs PRN    Today: Patient denies concerns.  Nursing has no concerns.  Therapy reports upper body dressing with min assist lower body dressing with max assist.  Ambulating 125 feet.  Independent with bed mobility and will discharge to the assisted living on 5/1.    Facility EHR reviewed including bm record, progress notes, vital signs and MAR.     ALLERGIES: Alphagan [brimonidine tartrate], Azopt [brinzolamide], Beta adrenergic blockers, Bisphosphonates, Estrogens, Gemfibrozil, Trusopt [dorzolamide], Xalatan [latanoprost], Zetia [ezetimibe], and Zocor [statins]    ROS:  Unobtainable secondary to cognitive impairment.     Vitals:  BP (!) 160/81   Pulse 66   Temp 97.9  F (36.6  C)   Resp 16   Ht 1.626 m (5' 4\")   Wt 53 kg (116 lb 12.8 oz)   LMP  (LMP Unknown)   SpO2 96%   BMI 20.05 kg/m    Exam:  GENERAL APPEARANCE:  Alert, in no distress  RESP:  respiratory effort normal  M/S:  Gait and station sitting in recliner  SKIN:  Inspection and palpation of skin and subcutaneous tissue at " baseline  PSYCH:  insight and judgement, memory improved from last week, affect and mood normal    ASSESSMENT/PLAN:  Anxiety   Improved with addition of mirtazapine.  On salt tablets for hyponatremia.  Recommend recheck of sodium in 2 weeks    Hyponatremia  Started on 1 g of sodium per day.  Recommend recheck in 2 weeks called daughter and updated with plan of care..     Acute pain  Current regimen is effective.  Continue with acetaminophen,   Encouraged patient to request help for transfers due to risk of falls.     Closed fracture of multiple cervical vertebrae, subsequent encounter  Closed nondisplaced fracture of right clavicle with routine healing, unspecified part of clavicle, subsequent encounter  Impaired mobility and activities of daily living  Admitted to the TCU for therapy and nursing care following a hospital stay.  - Continue PT/Ocupational Therapy  - Nursing for monitoring  -  following for discharge planning  - Follow up with orthopedics and neurosurgery as directed by them  - Patient declines the use of the Aspen collar and sling.     Chronic diastolic congestive heart failure (H)  Stable.  No current concerns.  Continue with Lasix, Imdur, metoprolol, losartan     Essential hypertension  Goal relaxed due to age and goals of care and fall risk.  Continue current medication regimen.     PAF (paroxysmal atrial fibrillation) (H)  Rate controlled.  Continue metoprolol.  Continue apixaban for stroke prevention    Orders:  No changes    Electronically signed by: Antoinette Oneill NP